# Patient Record
Sex: FEMALE | Race: WHITE | URBAN - METROPOLITAN AREA
[De-identification: names, ages, dates, MRNs, and addresses within clinical notes are randomized per-mention and may not be internally consistent; named-entity substitution may affect disease eponyms.]

---
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
## 2022-11-20 ENCOUNTER — APPOINTMENT (OUTPATIENT)
Dept: ULTRASOUND IMAGING | Age: 22
End: 2022-11-20
Payer: COMMERCIAL

## 2022-11-20 ENCOUNTER — HOSPITAL ENCOUNTER (OUTPATIENT)
Age: 22
Setting detail: OBSERVATION
Discharge: HOME | End: 2022-11-22
Attending: EMERGENCY MEDICINE
Payer: COMMERCIAL

## 2022-11-20 DIAGNOSIS — R11.2 INTRACTABLE NAUSEA AND VOMITING: Primary | ICD-10-CM

## 2022-11-20 DIAGNOSIS — E87.3 METABOLIC ALKALOSIS: ICD-10-CM

## 2022-11-20 DIAGNOSIS — D72.829 LEUKOCYTOSIS, UNSPECIFIED TYPE: ICD-10-CM

## 2022-11-20 DIAGNOSIS — E87.6 HYPOKALEMIA: ICD-10-CM

## 2022-11-20 DIAGNOSIS — E83.42 HYPOMAGNESEMIA: ICD-10-CM

## 2022-11-20 LAB
A/G RATIO: 1.7 (ref 1.1–2.2)
ALBUMIN SERPL-MCNC: 4.4 G/DL (ref 3.4–5)
ALP BLD-CCNC: 65 U/L (ref 40–129)
ALT SERPL-CCNC: 20 U/L (ref 10–40)
ANION GAP SERPL CALCULATED.3IONS-SCNC: 20 MMOL/L (ref 3–16)
AST SERPL-CCNC: 22 U/L (ref 15–37)
BACTERIA: ABNORMAL /HPF
BASE EXCESS VENOUS: -2.2 MMOL/L (ref -3–3)
BASOPHILS ABSOLUTE: 0.1 K/UL (ref 0–0.2)
BASOPHILS RELATIVE PERCENT: 0.9 %
BILIRUB SERPL-MCNC: 0.7 MG/DL (ref 0–1)
BILIRUBIN URINE: ABNORMAL
BLOOD, URINE: ABNORMAL
BUN BLDV-MCNC: 9 MG/DL (ref 7–20)
CALCIUM SERPL-MCNC: 9.4 MG/DL (ref 8.3–10.6)
CARBOXYHEMOGLOBIN: 1.1 % (ref 0–1.5)
CHLORIDE BLD-SCNC: 96 MMOL/L (ref 99–110)
CLARITY: CLEAR
CO2: 18 MMOL/L (ref 21–32)
COLOR: YELLOW
CREAT SERPL-MCNC: 0.6 MG/DL (ref 0.6–1.1)
EOSINOPHILS ABSOLUTE: 0 K/UL (ref 0–0.6)
EOSINOPHILS RELATIVE PERCENT: 0.1 %
EPITHELIAL CELLS, UA: ABNORMAL /HPF (ref 0–5)
GFR SERPL CREATININE-BSD FRML MDRD: >60 ML/MIN/{1.73_M2}
GLUCOSE BLD-MCNC: 127 MG/DL (ref 70–99)
GLUCOSE BLD-MCNC: 142 MG/DL (ref 70–99)
GLUCOSE URINE: NEGATIVE MG/DL
HCG QUALITATIVE: NEGATIVE
HCO3 VENOUS: 18.3 MMOL/L (ref 23–29)
HCT VFR BLD CALC: 39.3 % (ref 36–48)
HEMOGLOBIN: 13.5 G/DL (ref 12–16)
KETONES, URINE: >=80 MG/DL
LEUKOCYTE ESTERASE, URINE: NEGATIVE
LIPASE: 14 U/L (ref 13–60)
LYMPHOCYTES ABSOLUTE: 1.4 K/UL (ref 1–5.1)
LYMPHOCYTES RELATIVE PERCENT: 9.3 %
MAGNESIUM: 1.7 MG/DL (ref 1.8–2.4)
MCH RBC QN AUTO: 30 PG (ref 26–34)
MCHC RBC AUTO-ENTMCNC: 34.3 G/DL (ref 31–36)
MCV RBC AUTO: 87.4 FL (ref 80–100)
METHEMOGLOBIN VENOUS: 0.6 %
MICROSCOPIC EXAMINATION: YES
MONOCYTES ABSOLUTE: 0.4 K/UL (ref 0–1.3)
MONOCYTES RELATIVE PERCENT: 2.9 %
MUCUS: ABNORMAL /LPF
NEUTROPHILS ABSOLUTE: 12.8 K/UL (ref 1.7–7.7)
NEUTROPHILS RELATIVE PERCENT: 86.8 %
NITRITE, URINE: NEGATIVE
O2 SAT, VEN: 99 %
O2 THERAPY: ABNORMAL
PCO2, VEN: 22.3 MMHG (ref 40–50)
PDW BLD-RTO: 13.3 % (ref 12.4–15.4)
PERFORMED ON: ABNORMAL
PH UA: 6 (ref 5–8)
PH VENOUS: 7.53 (ref 7.35–7.45)
PLATELET # BLD: 408 K/UL (ref 135–450)
PMV BLD AUTO: 7.8 FL (ref 5–10.5)
PO2, VEN: 146.1 MMHG (ref 25–40)
POTASSIUM REFLEX MAGNESIUM: 3.3 MMOL/L (ref 3.5–5.1)
PROTEIN UA: ABNORMAL MG/DL
RBC # BLD: 4.5 M/UL (ref 4–5.2)
RBC UA: ABNORMAL /HPF (ref 0–4)
SODIUM BLD-SCNC: 134 MMOL/L (ref 136–145)
SPECIFIC GRAVITY UA: >=1.03 (ref 1–1.03)
TCO2 CALC VENOUS: 19 MMOL/L
TOTAL PROTEIN: 7 G/DL (ref 6.4–8.2)
TROPONIN: <0.01 NG/ML
URINE REFLEX TO CULTURE: ABNORMAL
URINE TYPE: ABNORMAL
UROBILINOGEN, URINE: 0.2 E.U./DL
WBC # BLD: 14.7 K/UL (ref 4–11)
WBC UA: ABNORMAL /HPF (ref 0–5)

## 2022-11-20 PROCEDURE — C9113 INJ PANTOPRAZOLE SODIUM, VIA: HCPCS | Performed by: HOSPITALIST

## 2022-11-20 PROCEDURE — 96375 TX/PRO/DX INJ NEW DRUG ADDON: CPT

## 2022-11-20 PROCEDURE — 96361 HYDRATE IV INFUSION ADD-ON: CPT

## 2022-11-20 PROCEDURE — 83690 ASSAY OF LIPASE: CPT

## 2022-11-20 PROCEDURE — 82803 BLOOD GASES ANY COMBINATION: CPT

## 2022-11-20 PROCEDURE — 84703 CHORIONIC GONADOTROPIN ASSAY: CPT

## 2022-11-20 PROCEDURE — 96366 THER/PROPH/DIAG IV INF ADDON: CPT

## 2022-11-20 PROCEDURE — G0378 HOSPITAL OBSERVATION PER HR: HCPCS

## 2022-11-20 PROCEDURE — 76705 ECHO EXAM OF ABDOMEN: CPT

## 2022-11-20 PROCEDURE — 2580000003 HC RX 258: Performed by: REGISTERED NURSE

## 2022-11-20 PROCEDURE — 6360000002 HC RX W HCPCS: Performed by: REGISTERED NURSE

## 2022-11-20 PROCEDURE — 84484 ASSAY OF TROPONIN QUANT: CPT

## 2022-11-20 PROCEDURE — 96372 THER/PROPH/DIAG INJ SC/IM: CPT

## 2022-11-20 PROCEDURE — 85025 COMPLETE CBC W/AUTO DIFF WBC: CPT

## 2022-11-20 PROCEDURE — 80053 COMPREHEN METABOLIC PANEL: CPT

## 2022-11-20 PROCEDURE — 83036 HEMOGLOBIN GLYCOSYLATED A1C: CPT

## 2022-11-20 PROCEDURE — 83735 ASSAY OF MAGNESIUM: CPT

## 2022-11-20 PROCEDURE — 6370000000 HC RX 637 (ALT 250 FOR IP): Performed by: REGISTERED NURSE

## 2022-11-20 PROCEDURE — 2580000003 HC RX 258: Performed by: HOSPITALIST

## 2022-11-20 PROCEDURE — 96365 THER/PROPH/DIAG IV INF INIT: CPT

## 2022-11-20 PROCEDURE — 6360000002 HC RX W HCPCS: Performed by: HOSPITALIST

## 2022-11-20 PROCEDURE — 93005 ELECTROCARDIOGRAM TRACING: CPT | Performed by: REGISTERED NURSE

## 2022-11-20 PROCEDURE — 99285 EMERGENCY DEPT VISIT HI MDM: CPT

## 2022-11-20 PROCEDURE — 81001 URINALYSIS AUTO W/SCOPE: CPT

## 2022-11-20 RX ORDER — MAGNESIUM SULFATE IN WATER 40 MG/ML
2000 INJECTION, SOLUTION INTRAVENOUS ONCE
Status: COMPLETED | OUTPATIENT
Start: 2022-11-20 | End: 2022-11-20

## 2022-11-20 RX ORDER — ONDANSETRON 2 MG/ML
4 INJECTION INTRAMUSCULAR; INTRAVENOUS ONCE
Status: COMPLETED | OUTPATIENT
Start: 2022-11-20 | End: 2022-11-20

## 2022-11-20 RX ORDER — SODIUM CHLORIDE 0.9 % (FLUSH) 0.9 %
5-40 SYRINGE (ML) INJECTION PRN
Status: DISCONTINUED | OUTPATIENT
Start: 2022-11-20 | End: 2022-11-22 | Stop reason: HOSPADM

## 2022-11-20 RX ORDER — METOCLOPRAMIDE HYDROCHLORIDE 5 MG/ML
10 INJECTION INTRAMUSCULAR; INTRAVENOUS 3 TIMES DAILY
Status: DISCONTINUED | OUTPATIENT
Start: 2022-11-20 | End: 2022-11-22

## 2022-11-20 RX ORDER — ACETAMINOPHEN 650 MG/1
650 SUPPOSITORY RECTAL EVERY 6 HOURS PRN
Status: DISCONTINUED | OUTPATIENT
Start: 2022-11-20 | End: 2022-11-22 | Stop reason: HOSPADM

## 2022-11-20 RX ORDER — DEXTROSE MONOHYDRATE 100 MG/ML
INJECTION, SOLUTION INTRAVENOUS CONTINUOUS PRN
Status: DISCONTINUED | OUTPATIENT
Start: 2022-11-20 | End: 2022-11-22 | Stop reason: HOSPADM

## 2022-11-20 RX ORDER — ENOXAPARIN SODIUM 100 MG/ML
40 INJECTION SUBCUTANEOUS DAILY
Status: DISCONTINUED | OUTPATIENT
Start: 2022-11-21 | End: 2022-11-22 | Stop reason: HOSPADM

## 2022-11-20 RX ORDER — PANTOPRAZOLE SODIUM 40 MG/10ML
40 INJECTION, POWDER, LYOPHILIZED, FOR SOLUTION INTRAVENOUS DAILY
Status: DISCONTINUED | OUTPATIENT
Start: 2022-11-20 | End: 2022-11-22

## 2022-11-20 RX ORDER — SODIUM CHLORIDE 9 MG/ML
INJECTION, SOLUTION INTRAVENOUS CONTINUOUS
Status: DISCONTINUED | OUTPATIENT
Start: 2022-11-20 | End: 2022-11-22

## 2022-11-20 RX ORDER — PROMETHAZINE HYDROCHLORIDE 25 MG/ML
12.5 INJECTION, SOLUTION INTRAMUSCULAR; INTRAVENOUS ONCE
Status: COMPLETED | OUTPATIENT
Start: 2022-11-20 | End: 2022-11-20

## 2022-11-20 RX ORDER — POTASSIUM CHLORIDE 20 MEQ/1
20 TABLET, EXTENDED RELEASE ORAL ONCE
Status: COMPLETED | OUTPATIENT
Start: 2022-11-20 | End: 2022-11-20

## 2022-11-20 RX ORDER — SODIUM CHLORIDE 9 MG/ML
INJECTION, SOLUTION INTRAVENOUS PRN
Status: DISCONTINUED | OUTPATIENT
Start: 2022-11-20 | End: 2022-11-22 | Stop reason: HOSPADM

## 2022-11-20 RX ORDER — ONDANSETRON 2 MG/ML
4 INJECTION INTRAMUSCULAR; INTRAVENOUS EVERY 6 HOURS PRN
Status: DISCONTINUED | OUTPATIENT
Start: 2022-11-20 | End: 2022-11-22 | Stop reason: HOSPADM

## 2022-11-20 RX ORDER — ACETAMINOPHEN 325 MG/1
650 TABLET ORAL EVERY 6 HOURS PRN
Status: DISCONTINUED | OUTPATIENT
Start: 2022-11-20 | End: 2022-11-22 | Stop reason: HOSPADM

## 2022-11-20 RX ORDER — POTASSIUM CHLORIDE 7.45 MG/ML
10 INJECTION INTRAVENOUS PRN
Status: DISCONTINUED | OUTPATIENT
Start: 2022-11-20 | End: 2022-11-22 | Stop reason: HOSPADM

## 2022-11-20 RX ORDER — POTASSIUM CHLORIDE 20 MEQ/1
40 TABLET, EXTENDED RELEASE ORAL PRN
Status: DISCONTINUED | OUTPATIENT
Start: 2022-11-20 | End: 2022-11-22 | Stop reason: HOSPADM

## 2022-11-20 RX ORDER — POLYETHYLENE GLYCOL 3350 17 G/17G
17 POWDER, FOR SOLUTION ORAL DAILY PRN
Status: DISCONTINUED | OUTPATIENT
Start: 2022-11-20 | End: 2022-11-22 | Stop reason: HOSPADM

## 2022-11-20 RX ORDER — SODIUM CHLORIDE 0.9 % (FLUSH) 0.9 %
5-40 SYRINGE (ML) INJECTION EVERY 12 HOURS SCHEDULED
Status: DISCONTINUED | OUTPATIENT
Start: 2022-11-20 | End: 2022-11-22 | Stop reason: HOSPADM

## 2022-11-20 RX ORDER — ONDANSETRON 4 MG/1
4 TABLET, ORALLY DISINTEGRATING ORAL EVERY 8 HOURS PRN
Status: DISCONTINUED | OUTPATIENT
Start: 2022-11-20 | End: 2022-11-22 | Stop reason: HOSPADM

## 2022-11-20 RX ORDER — 0.9 % SODIUM CHLORIDE 0.9 %
1000 INTRAVENOUS SOLUTION INTRAVENOUS ONCE
Status: COMPLETED | OUTPATIENT
Start: 2022-11-20 | End: 2022-11-20

## 2022-11-20 RX ORDER — DICYCLOMINE HYDROCHLORIDE 10 MG/ML
10 INJECTION INTRAMUSCULAR ONCE
Status: COMPLETED | OUTPATIENT
Start: 2022-11-20 | End: 2022-11-20

## 2022-11-20 RX ADMIN — DICYCLOMINE HYDROCHLORIDE 10 MG: 10 INJECTION, SOLUTION INTRAMUSCULAR at 16:04

## 2022-11-20 RX ADMIN — SODIUM CHLORIDE 1000 ML: 9 INJECTION, SOLUTION INTRAVENOUS at 17:19

## 2022-11-20 RX ADMIN — SODIUM CHLORIDE: 9 INJECTION, SOLUTION INTRAVENOUS at 20:13

## 2022-11-20 RX ADMIN — SODIUM CHLORIDE 1000 ML: 9 INJECTION, SOLUTION INTRAVENOUS at 16:20

## 2022-11-20 RX ADMIN — POTASSIUM CHLORIDE 20 MEQ: 20 TABLET, EXTENDED RELEASE ORAL at 17:45

## 2022-11-20 RX ADMIN — ONDANSETRON 4 MG: 2 INJECTION INTRAMUSCULAR; INTRAVENOUS at 16:20

## 2022-11-20 RX ADMIN — PROMETHAZINE HYDROCHLORIDE 12.5 MG: 25 INJECTION INTRAMUSCULAR; INTRAVENOUS at 17:28

## 2022-11-20 RX ADMIN — MAGNESIUM SULFATE HEPTAHYDRATE 2000 MG: 40 INJECTION, SOLUTION INTRAVENOUS at 17:22

## 2022-11-20 RX ADMIN — PANTOPRAZOLE SODIUM 40 MG: 40 INJECTION, POWDER, FOR SOLUTION INTRAVENOUS at 20:05

## 2022-11-20 RX ADMIN — SODIUM CHLORIDE, PRESERVATIVE FREE 10 ML: 5 INJECTION INTRAVENOUS at 20:14

## 2022-11-20 RX ADMIN — METOCLOPRAMIDE HYDROCHLORIDE 10 MG: 5 INJECTION INTRAMUSCULAR; INTRAVENOUS at 20:05

## 2022-11-20 ASSESSMENT — PAIN DESCRIPTION - ORIENTATION
ORIENTATION: RIGHT;LEFT;MID
ORIENTATION: RIGHT

## 2022-11-20 ASSESSMENT — ENCOUNTER SYMPTOMS
SORE THROAT: 0
BLOOD IN STOOL: 0
RHINORRHEA: 0
CHEST TIGHTNESS: 0
CONSTIPATION: 0
ABDOMINAL PAIN: 1
DIARRHEA: 0
VOMITING: 1
SHORTNESS OF BREATH: 0
COUGH: 0
BACK PAIN: 0
NAUSEA: 1

## 2022-11-20 ASSESSMENT — PAIN SCALES - GENERAL
PAINLEVEL_OUTOF10: 8
PAINLEVEL_OUTOF10: 8

## 2022-11-20 ASSESSMENT — PAIN - FUNCTIONAL ASSESSMENT: PAIN_FUNCTIONAL_ASSESSMENT: 0-10

## 2022-11-20 ASSESSMENT — PAIN DESCRIPTION - LOCATION
LOCATION: ABDOMEN
LOCATION: ABDOMEN

## 2022-11-20 NOTE — ED PROVIDER NOTES
[address] [address] TELE/MED SURG/ONC  EMERGENCY DEPARTMENT ENCOUNTER        Pt Name: Orvilla Eisenmenger  MRN: [id number]  [place] 2000  Date of evaluation: 11/20/2022  Provider: FIDENCIO Drummond - CNP  PCP: John Tapia MD    This patient was seen and evaluated by the attending physician Sarah Coombs MD.    I have evaluated this patient. My supervising physician was available for consultation. CHIEF COMPLAINT       Chief Complaint   Patient presents with    Emesis     Pt reports she was discharged yesterday for vomiting, \"I was better and today I am not. \" Pt is alert and oriented. Type 1 diabetic. HISTORY OF PRESENT ILLNESS   (Location/Symptom, Timing/Onset, Context/Setting, Quality, Duration, Modifying Factors, Severity)  Note limiting factors. Orvilla Eisenmenger is a 25 y.o. female who presents via private car for  abd pain, nausea and vomiting. Onset was several months. Duration has been intermittent since the onset. Context includes patient presents to the emergency department today with complaints of nausea and vomiting. She states that this has been ongoing for the last several months states she is worked with her primary care physician to identify a cause. She states she does have a referral for GI. She recently had an endoscopy and was told that this was negative. She did have a history of daily marijuana usage and they thought that could be the cause so she stopped smoking approximately 1 month ago. She endorses that on Friday her nausea and vomiting became such that she was unable to tolerate any p.o. She went to the emergency department and was admitted. At that time she had a CT scan that she was told was negative. She states she was discharged yesterday but today has had continued nausea and vomiting is not able to keep anything down. Today she is endorsing epigastric abdominal pain and right upper quadrant abdominal pain.   She denies any chest pain, palpitations or swelling in her extremities. She denies any shortness of breath, cough congestion or fevers. She denies any constipation or diarrhea and states that her last bowel movement was yesterday and was normal.  She denies any urinary symptoms including dysuria, urgency, frequency, hematuria or flank pain. She is a type I diabetic with a basal insulin rate of 1.05.  Current blood sugar is 93. Quality is aching and cramping  with radiation to her epigastrum and right upper quadrant. Alleviating factors include nothing. Aggravating factors include eating. Pain is 8/10. Nothing has been used for pain today. Chart review reveals pt has significant PMHx of depression, diabetes. They take elavil, humalog, lantus, sprintec. Nursing Notes were all reviewed and agreed with or any disagreements were addressed  in the HPI. Pt was seen during the [place] 19 pandemic. Appropriate PPE worn by ME during patient encounters. Pt seen during a time with constrained hospital bed capacity and other potential inpatient and outpatient resources were constrained due to the viral pandemic. REVIEW OF SYSTEMS    (2-9 systems for level 4, 10 or more for level 5)     Review of Systems   Constitutional:  Negative for chills, diaphoresis and fever. HENT:  Negative for congestion, rhinorrhea and sore throat. Respiratory:  Negative for cough, chest tightness and shortness of breath. Cardiovascular:  Negative for chest pain, palpitations and leg swelling. Gastrointestinal:  Positive for abdominal pain, nausea and vomiting. Negative for blood in stool, constipation and diarrhea. Nonbilious nonbloody emesis   Genitourinary:  Negative for difficulty urinating, dysuria, flank pain, frequency, hematuria, pelvic pain and urgency. Musculoskeletal:  Negative for back pain and myalgias. Skin:  Negative for rash and wound. Neurological:  Negative for dizziness, light-headedness and headaches.      Positives and Pertinent negatives as per HPI. Except as noted abovein the ROS, all other systems were reviewed and negative. PAST MEDICAL HISTORY     Past Medical History:   Diagnosis Date    ADHD     Diabetes mellitus ([address]          SURGICAL HISTORY     Past Surgical History:   Procedure Laterality Date    TONSILLECTOMY           CURRENTMEDICATIONS       Current Discharge Medication List        CONTINUE these medications which have NOT CHANGED    Details   HUMALOG 100 UNIT/ML injection vial       amitriptyline (ELAVIL) 50 MG tablet       SPRINTEC 28 0.25-35 MG-MCG per tablet       insulin glargine (LANTUS) 100 UNIT/ML injection Inject  into the skin nightly. ALLERGIES     Patient has no known allergies. FAMILYHISTORY     History reviewed. No pertinent family history. SOCIAL HISTORY       Social History     Socioeconomic History    Marital status: Single     Spouse name: None    Number of children: None    Years of education: None    Highest education level: None   Tobacco Use    Smoking status: Never   Substance and Sexual Activity    Alcohol use: Not Currently    Drug use: Never       SCREENINGS             PHYSICAL EXAM    (up to 7 for level 4, 8 or more for level 5)     ED Triage Vitals   BP Temp Temp src Pulse Resp SpO2 Height Weight   -- -- -- -- -- -- -- --       Physical Exam  Vitals and nursing note reviewed. Constitutional:       Appearance: Normal appearance. She is not ill-appearing or diaphoretic. HENT:      Head: Normocephalic and atraumatic. Right Ear: External ear normal.      Left Ear: External ear normal.      Mouth/Throat:      Mouth: Mucous membranes are dry. Pharynx: Oropharynx is clear. Eyes:      General:         Right eye: No discharge. Left eye: No discharge. Cardiovascular:      Rate and Rhythm: Normal rate and regular rhythm. Pulses: Normal pulses. Radial pulses are 2+ on the right side and 2+ on the left side.       Heart sounds: Normal heart sounds. No murmur heard. No friction rub. No gallop. Pulmonary:      Effort: Pulmonary effort is normal. No respiratory distress. Breath sounds: Normal breath sounds. No stridor. No wheezing, rhonchi or rales. Chest:      Chest wall: No tenderness. Abdominal:      General: Abdomen is flat. Bowel sounds are normal.      Palpations: Abdomen is soft. Tenderness: There is abdominal tenderness in the right upper quadrant and epigastric area. There is no right CVA tenderness, left CVA tenderness, guarding or rebound. Positive signs include Campos's sign. Negative signs include Rovsing's sign, McBurney's sign, psoas sign and obturator sign. Hernia: No hernia is present. Musculoskeletal:         General: Normal range of motion. Cervical back: Normal range of motion and neck supple. Skin:     General: Skin is warm and dry. Neurological:      General: No focal deficit present. Mental Status: She is alert and oriented to person, place, and time. GCS: GCS eye subscore is 4. GCS verbal subscore is 5. GCS motor subscore is 6.    Psychiatric:         Mood and Affect: Mood normal.         Behavior: Behavior normal.     PHYSICAL EXAM  BP (!) 177/99   Pulse 67   Temp 98.1 °F (36.7 °C) (Oral)   Resp 22   Ht 5' 2\" (1.575 m)   Wt 135 lb 12.8 oz (61.6 kg)   LMP 11/14/2022   SpO2 99%   BMI 24.84 kg/m²       DIAGNOSTIC RESULTS   LABS:    Labs Reviewed   CBC WITH AUTO DIFFERENTIAL - Abnormal; Notable for the following components:       Result Value    WBC 14.7 (*)     Neutrophils Absolute 12.8 (*)     All other components within normal limits   COMPREHENSIVE METABOLIC PANEL W/ REFLEX TO MG FOR LOW K - Abnormal; Notable for the following components:    Sodium 134 (*)     Potassium reflex Magnesium 3.3 (*)     Chloride 96 (*)     CO2 18 (*)     Anion Gap 20 (*)     Glucose 127 (*)     All other components within normal limits   BLOOD GAS, VENOUS - Abnormal; Notable for the following components:    pH, Daniel 7.533 (*)     pCO2, Daniel 22.3 (*)     pO2, Daniel 146.1 (*)     HCO3, Venous 18.3 (*)     All other components within normal limits   URINALYSIS WITH REFLEX TO CULTURE - Abnormal; Notable for the following components:    Bilirubin Urine SMALL (*)     Ketones, Urine >=80 (*)     Blood, Urine TRACE-INTACT (*)     Protein, UA TRACE (*)     All other components within normal limits   MICROSCOPIC URINALYSIS - Abnormal; Notable for the following components:    Mucus, UA 3+ (*)     Epithelial Cells, UA 11-20 (*)     Bacteria, UA 2+ (*)     All other components within normal limits   MAGNESIUM - Abnormal; Notable for the following components:    Magnesium 1.70 (*)     All other components within normal limits   POCT GLUCOSE - Abnormal; Notable for the following components:    POC Glucose 142 (*)     All other components within normal limits   HCG, SERUM, QUALITATIVE   TROPONIN   LIPASE   BASIC METABOLIC PANEL W/ REFLEX TO MG FOR LOW K   CBC WITH AUTO DIFFERENTIAL   HEMOGLOBIN A1C   POCT GLUCOSE   POCT GLUCOSE   POCT GLUCOSE       All other labs were within normal range or not returned as of this dictation. EKG: All EKG's are interpreted by the Emergency Department Physician who either signs orCo-signs this chart in the absence of a cardiologist.  Please see their note for interpretation of EKG. RADIOLOGY:   Non-plain film images such as CT, Ultrasound and MRI are read by the radiologist. [place] radiographic images are visualized andpreliminarily interpreted by the  ED Provider with the below findings:        Interpretation perthe Radiologist below, if available at the time of this note:    [address]   Final Result   No cholelithiasis or significant dilation of the common duct. 4 cm hypoechoic focus in the medial aspect of the right lobe of the liver. This is most likely focal fat infiltration. Non-urgent follow-up CT liver is   recommended. No results found. PROCEDURES   Unless otherwise noted below, none     Procedures    CRITICAL CARE TIME   N/A    CONSULTS:  IP CONSULT TO GI      EMERGENCY DEPARTMENT COURSE and DIFFERENTIALDIAGNOSIS/MDM:   Vitals:    Vitals:    11/20/22 1523 11/20/22 1722 11/20/22 1915 11/20/22 1921   BP: (!) 155/99 (!) 150/90 (!) 177/99    Pulse:  74 75 67   Resp:  18 22    Temp:   98.1 °F (36.7 °C)    TempSrc:   Oral    SpO2:  100% 99% 99%   Weight:   135 lb 12.8 oz (61.6 kg)    Height:   5' 2\" (1.575 m)        Patient was given thefollowing medications:  Medications   glucose chewable tablet 16 g (has no administration in time range)   dextrose bolus 10% 125 mL (has no administration in time range)     Or   dextrose bolus 10% 250 mL (has no administration in time range)   glucagon (rDNA) injection 1 mg (has no administration in time range)   dextrose 10 % infusion (has no administration in time range)   sodium chloride flush 0.9 % injection 5-40 mL (10 mLs IntraVENous Given 11/20/22 2014)   sodium chloride flush 0.9 % injection 5-40 mL (has no administration in time range)   0.9 % sodium chloride infusion (has no administration in time range)   enoxaparin (LOVENOX) injection 40 mg (has no administration in time range)   ondansetron (ZOFRAN-ODT) disintegrating tablet 4 mg (has no administration in time range)     Or   ondansetron (ZOFRAN) injection 4 mg (has no administration in time range)   polyethylene glycol (GLYCOLAX) packet 17 g (has no administration in time range)   acetaminophen (TYLENOL) tablet 650 mg (has no administration in time range)     Or   acetaminophen (TYLENOL) suppository 650 mg (has no administration in time range)   potassium chloride (KLOR-CON M) extended release tablet 40 mEq (has no administration in time range)     Or   potassium bicarb-citric acid (EFFER-K) effervescent tablet 40 mEq (has no administration in time range)     Or   potassium chloride 10 mEq/100 mL IVPB (Peripheral Line) (has no administration in time range)   0.9 % sodium chloride infusion ( IntraVENous New Bag 11/20/22 2013)   metoclopramide (REGLAN) injection 10 mg (10 mg IntraVENous Given 11/20/22 2005)   pantoprazole (PROTONIX) injection 40 mg (40 mg IntraVENous Given 11/20/22 2005)   0.9 % sodium chloride bolus (0 mLs IntraVENous Stopped 11/20/22 1718)   ondansetron (ZOFRAN) injection 4 mg (4 mg IntraVENous Given 11/20/22 1620)   dicyclomine (BENTYL) injection 10 mg (10 mg IntraMUSCular Given 11/20/22 1604)   0.9 % sodium chloride bolus (0 mLs IntraVENous Stopped 11/20/22 2009)   magnesium sulfate 2000 mg in 50 mL IVPB premix (0 mg IntraVENous Stopped 11/20/22 1936)   potassium chloride (KLOR-CON M) extended release tablet 20 mEq (20 mEq Oral Given 11/20/22 1745)   promethazine (PHENERGAN) injection 12.5 mg (12.5 mg IntraMUSCular Given 11/20/22 1728)       PDMP Monitoring:    Last PDMP Anupam as Reviewed [facility]):  Review User Review Instant Review Result            Urine Drug Screenings (1 yr)    No resulted procedures found. Medication Contract and Consent for Opioid Use Documents Filed        No documents found                    MDM:   This patient was seen and evaluated by myself and my attending physician. She presents to the emergency department today with a chronic history of nausea vomiting and abdominal pain. She states is worsening in the last 24 hours and localized to the epigastrium and right upper quadrant. On exam she is alert and oriented, hemodynamically stable and nontoxic in appearance. She will have a work-up in the emergency department consisting of Mal Hair studies and ultrasound imaging. She will be given an IV bolus as well as Zofran for nausea and Bentyl for cramping. On reevaluation Zofran did not help with nausea. Patient was given a subsequent dose of Phenergan which did relieve some of her nausea. Laboratory studies and images were evaluated and reviewed with the patient.   CBC does reveal mild leukocytosis at 14.7 otherwise grossly negative. CMP reveals mild hyponatremia at 134, potassium 3.3, she did receive 20 mEq p.o. and was able to tolerate. Chloride 96, CO2 18, anion gap 20, glucose 127. Venous blood gas reveals pH 7.5, PCO2 22.3, PO2 146, bicarb 18.3  Urinalysis does reveal small amount of bili with greater than 80 ketones and trace blood with trace protein. hCG is negative. Urine micro reveals 3+ mucus with 11-20 epithelial cells and 2+ bacteria. Magnesium 1.7, she did receive 2 g via IV replacement. Troponin negative, lipase 14. Ultrasound gallbladder interpreted by the radiologist for no cholelithiasis or significant dilation of the common duct. There is a 4 cm hypoechoic focus in the medial aspect of the right lobe of the liver most likely fatty infiltration, I did communicate this finding with the patient. EKG interpreted by the attending physician. Interpretation on EKG stated [email or web address], Hospitalist notified of this finding via perfect serve. The patient was given a second liter of IV fluids due to increased ketones in urine. I discussed with her a plan for admission for electrolyte dyscrasias, rehydration and GI consult she was agreeable to this plan. Consult placed to hospital medicine for disposition of admission. Is this patient to be included in the SEP-1 Core Measure due to severe sepsis or septic shock? No   Exclusion criteria - the patient is NOT to be included for SEP-1 Core Measure due to:  2+ SIRS criteria are not met    Discharge Time out:  CC Reviewed Yes   Test Results Yes     Vitals:    11/20/22 1921   BP:    Pulse: 67   Resp:    Temp:    SpO2: 99%              FINAL IMPRESSION      1. Intractable nausea and vomiting    2. Hypokalemia    3. Hypomagnesemia    4. Metabolic alkalosis    5. Leukocytosis, unspecified type          DISPOSITION/PLAN   DISPOSITION Admitted 11/20/2022 06:10:27 PM      PATIENT REFERREDTO:  No follow-up provider specified.     DISCHARGE MEDICATIONS:  Current Discharge Medication List          DISCONTINUED MEDICATIONS:  Current Discharge Medication List                 (Please note that portions ofthis note were completed with a voice recognition program.  Efforts were made to edit the dictations but occasionally words are mis-transcribed.)    Eldora Bence, APRN - CNP (electronically signed)       Eldora Bence, APRN - CNP  11/20/22 [facility] 8104, FIDENCIO Paez CNP  11/20/22 9370

## 2022-11-20 NOTE — PROGRESS NOTES
24 yo female patient of Dr. Baldo Abdul being admitted for recurrent nausea/vomiting.   Full evaluation in the am.

## 2022-11-20 NOTE — H&P
Hospital Medicine History & Physical      PCP: Shelbi Pablo MD    Date of Admission: 11/20/2022    Date of Service: Pt seen/examined on 11/20/2022 and Admitted to Inpatient with expected LOS less than two midnights due to medical therapy. Placed in Observation. Chief Complaint: Nausea vomiting      History Of Present Illness:      25 y.o. female who presented to [facility] with nausea vomiting started since Friday unable to keep anything down came to the emergency room, patient was noted to have hypokalemia no DKA, patient with a history of insulin-dependent diabetes mellitus since age 7 followed by endocrinologist at [facility] Dr. John Wood on insulin pump, patient is stated recently she changed her birth control from NuvaRing to start on [facility] 3 weeks ago started having nausea on Friday. Patient denies any fever chills no chest pain or shortness of breath no cough no sputum positive nausea vomiting denies any diarrhea. She does not to smoke or drink alcohol. Past Medical History:          Diagnosis Date    ADHD     Diabetes mellitus ([address]        Past Surgical History:          Procedure Laterality Date    TONSILLECTOMY         Medications Prior to Admission:      Prior to Admission medications    Medication Sig Start Date End Date Taking? Authorizing Provider   HUMALOG 100 UNIT/ML injection vial  11/24/20   Historical Provider, MD   amitriptyline (ELAVIL) 50 MG tablet  11/2/20   Historical Provider, MD   [address] [address] 0.25-35 MG-MCG per tablet  12/7/20   Historical Provider, MD   insulin glargine (LANTUS) 100 UNIT/ML injection Inject  into the skin nightly. Historical Provider, MD       Allergies:  Patient has no known allergies. Social History:      The patient currently lives with her grandmother    TOBACCO:   reports that she has never smoked. She does not have any smokeless tobacco history on file. ETOH:   reports that she does not currently use alcohol.   E-cigarette/Vaping Questions Responses    E-cigarette/Vaping Use     Start Date     Passive Exposure     Quit Date     Counseling Given     Comments               Family History:       Reviewed and negative in regards to presenting illness/complaint. History reviewed. No pertinent family history. REVIEW OF SYSTEMS COMPLETED:   Pertinent positives as noted in the HPI. All other systems reviewed and negative. PHYSICAL EXAM PERFORMED:    BP (!) 150/90   Pulse 74   Temp 97.9 °F (36.6 °C) (Oral)   Resp 18   LMP 11/14/2022   SpO2 100%     General appearance:  No apparent distress, appears stated age and cooperative. HEENT:  Normal cephalic, atraumatic without obvious deformity. Pupils equal, round, and reactive to light. Extra ocular muscles intact. Conjunctivae/corneas clear. Neck: Supple, with full range of motion. No jugular venous distention. Trachea midline. Respiratory:  Normal respiratory effort. Clear to auscultation, bilaterally without Rales/Wheezes/Rhonchi. Cardiovascular:  Regular rate and rhythm with normal S1/S2 without murmurs, rubs or gallops. Abdomen: Soft, non-tender, non-distended with normal bowel sounds. Musculoskeletal:  No clubbing, cyanosis or edema bilaterally. Full range of motion without deformity. Skin: Skin color, texture, turgor normal.  No rashes or lesions. Neurologic:  Neurovascularly intact without any focal sensory/motor deficits.  Cranial nerves: II-XII intact, grossly non-focal.  Psychiatric:  Alert and oriented, thought content appropriate, normal insight  Capillary Refill: Brisk,3 seconds, normal  Peripheral Pulses: +2 palpable, equal bilaterally       Labs:     Recent Labs     11/20/22  1610   WBC 14.7*   HGB 13.5   HCT 39.3        Recent Labs     11/20/22  1610   *   K 3.3*   CL 96*   CO2 18*   BUN 9   CREATININE 0.6   CALCIUM 9.4     Recent Labs     11/20/22  1610   AST 22   ALT 20   BILITOT 0.7   ALKPHOS 65     No results for input(s): INR in the last 72 hours. No results for input(s): Taco Middleton in the last 72 hours. Urinalysis:      Lab Results   Component Value Date/Time    NITRU Negative 11/20/2022 04:10 PM    WBCUA 3-5 11/20/2022 04:10 PM    BACTERIA 2+ 11/20/2022 04:10 PM    RBCUA 3-4 11/20/2022 04:10 PM    BLOODU TRACE-INTACT 11/20/2022 04:10 PM    SPECGRAV >=1.030 11/20/2022 04:10 PM    GLUCOSEU Negative 11/20/2022 04:10 PM       Radiology:     CXR: I have reviewed the CXR with the following interpretation:   EKG:  I have reviewed the EKG with the following interpretation:     US GALLBLADDER RUQ   Final Result   No cholelithiasis or significant dilation of the common duct. 4 cm hypoechoic focus in the medial aspect of the right lobe of the liver. This is most likely focal fat infiltration. Non-urgent follow-up CT liver is   recommended. Consults:    IP CONSULT TO GI    ASSESSMENT:    Active Hospital Problems    Diagnosis Date Noted    Intractable nausea and vomiting [R11.2] 11/20/2022     Priority: Medium   Insulin-dependent diabetes mellitus type 1      PLAN:  This is a 71-year-old female with a history of insulin-dependent diabetes mellitus type 1 since age 7 on insulin pump with intractable nausea vomiting will admit patient, started on IV fluid, clear liquids as tolerated, patient will manage her own insulin pump, recent hemoglobin A1c per mother 2 weeks ago 6.0. Started on Reglan IV 10 mg 3 times daily. Zofran as needed consulted GI. Hypokalemia supplement with IV potassium. Birth control continue with OCP pregnancy test negative in the emergency room. DVT Prophylaxis: Lovenox subcu  Diet: Clear liquids  Code Status: Full code    PT/OT Eval Status: Not indicated    Sara Mccoy MD    Thank you Rosas Villeda MD for the opportunity to be involved in this patient's care. If you have any questions or concerns please feel free to contact me at [phone].

## 2022-11-21 LAB
ANION GAP SERPL CALCULATED.3IONS-SCNC: 13 MMOL/L (ref 3–16)
BASOPHILS ABSOLUTE: 0.1 K/UL (ref 0–0.2)
BASOPHILS RELATIVE PERCENT: 0.8 %
BUN BLDV-MCNC: 3 MG/DL (ref 7–20)
CALCIUM SERPL-MCNC: 8.3 MG/DL (ref 8.3–10.6)
CHLORIDE BLD-SCNC: 102 MMOL/L (ref 99–110)
CO2: 21 MMOL/L (ref 21–32)
CREAT SERPL-MCNC: <0.5 MG/DL (ref 0.6–1.1)
EKG ATRIAL RATE: 54 BPM
EKG DIAGNOSIS: NORMAL
EKG P AXIS: -18 DEGREES
EKG P-R INTERVAL: 108 MS
EKG Q-T INTERVAL: 488 MS
EKG QRS DURATION: 112 MS
EKG QTC CALCULATION (BAZETT): 462 MS
EKG R AXIS: 37 DEGREES
EKG T AXIS: 69 DEGREES
EKG VENTRICULAR RATE: 54 BPM
EOSINOPHILS ABSOLUTE: 0 K/UL (ref 0–0.6)
EOSINOPHILS RELATIVE PERCENT: 0.2 %
ESTIMATED AVERAGE GLUCOSE: 134.1 MG/DL
GFR SERPL CREATININE-BSD FRML MDRD: >60 ML/MIN/{1.73_M2}
GLUCOSE BLD-MCNC: 85 MG/DL (ref 70–99)
HBA1C MFR BLD: 6.3 %
HCT VFR BLD CALC: 38.8 % (ref 36–48)
HEMOGLOBIN: 12.9 G/DL (ref 12–16)
LYMPHOCYTES ABSOLUTE: 2.5 K/UL (ref 1–5.1)
LYMPHOCYTES RELATIVE PERCENT: 21.3 %
MAGNESIUM: 2 MG/DL (ref 1.8–2.4)
MCH RBC QN AUTO: 29.6 PG (ref 26–34)
MCHC RBC AUTO-ENTMCNC: 33.1 G/DL (ref 31–36)
MCV RBC AUTO: 89.4 FL (ref 80–100)
MONOCYTES ABSOLUTE: 1 K/UL (ref 0–1.3)
MONOCYTES RELATIVE PERCENT: 8.5 %
NEUTROPHILS ABSOLUTE: 8.3 K/UL (ref 1.7–7.7)
NEUTROPHILS RELATIVE PERCENT: 69.2 %
PDW BLD-RTO: 13.3 % (ref 12.4–15.4)
PLATELET # BLD: 335 K/UL (ref 135–450)
PMV BLD AUTO: 8.5 FL (ref 5–10.5)
POTASSIUM REFLEX MAGNESIUM: 3.3 MMOL/L (ref 3.5–5.1)
RBC # BLD: 4.34 M/UL (ref 4–5.2)
SODIUM BLD-SCNC: 136 MMOL/L (ref 136–145)
WBC # BLD: 11.9 K/UL (ref 4–11)

## 2022-11-21 PROCEDURE — 6360000002 HC RX W HCPCS: Performed by: HOSPITALIST

## 2022-11-21 PROCEDURE — 6360000002 HC RX W HCPCS: Performed by: NURSE PRACTITIONER

## 2022-11-21 PROCEDURE — 96376 TX/PRO/DX INJ SAME DRUG ADON: CPT

## 2022-11-21 PROCEDURE — 80048 BASIC METABOLIC PNL TOTAL CA: CPT

## 2022-11-21 PROCEDURE — 6370000000 HC RX 637 (ALT 250 FOR IP): Performed by: HOSPITALIST

## 2022-11-21 PROCEDURE — 36415 COLL VENOUS BLD VENIPUNCTURE: CPT

## 2022-11-21 PROCEDURE — 2580000003 HC RX 258: Performed by: HOSPITALIST

## 2022-11-21 PROCEDURE — 85025 COMPLETE CBC W/AUTO DIFF WBC: CPT

## 2022-11-21 PROCEDURE — 93010 ELECTROCARDIOGRAM REPORT: CPT | Performed by: INTERNAL MEDICINE

## 2022-11-21 PROCEDURE — 96361 HYDRATE IV INFUSION ADD-ON: CPT

## 2022-11-21 PROCEDURE — C9113 INJ PANTOPRAZOLE SODIUM, VIA: HCPCS | Performed by: HOSPITALIST

## 2022-11-21 PROCEDURE — 83735 ASSAY OF MAGNESIUM: CPT

## 2022-11-21 PROCEDURE — 96372 THER/PROPH/DIAG INJ SC/IM: CPT

## 2022-11-21 PROCEDURE — G0378 HOSPITAL OBSERVATION PER HR: HCPCS

## 2022-11-21 RX ORDER — METOCLOPRAMIDE HYDROCHLORIDE 5 MG/ML
10 INJECTION INTRAMUSCULAR; INTRAVENOUS ONCE
Status: COMPLETED | OUTPATIENT
Start: 2022-11-21 | End: 2022-11-21

## 2022-11-21 RX ADMIN — POTASSIUM CHLORIDE 40 MEQ: 1500 TABLET, EXTENDED RELEASE ORAL at 11:31

## 2022-11-21 RX ADMIN — METOCLOPRAMIDE HYDROCHLORIDE 10 MG: 5 INJECTION INTRAMUSCULAR; INTRAVENOUS at 09:37

## 2022-11-21 RX ADMIN — METOCLOPRAMIDE HYDROCHLORIDE 10 MG: 5 INJECTION INTRAMUSCULAR; INTRAVENOUS at 20:51

## 2022-11-21 RX ADMIN — ACETAMINOPHEN 650 MG: 325 TABLET ORAL at 03:05

## 2022-11-21 RX ADMIN — SODIUM CHLORIDE, PRESERVATIVE FREE 10 ML: 5 INJECTION INTRAVENOUS at 09:37

## 2022-11-21 RX ADMIN — SODIUM CHLORIDE: 9 INJECTION, SOLUTION INTRAVENOUS at 06:20

## 2022-11-21 RX ADMIN — ONDANSETRON 4 MG: 2 INJECTION INTRAMUSCULAR; INTRAVENOUS at 03:05

## 2022-11-21 RX ADMIN — PANTOPRAZOLE SODIUM 40 MG: 40 INJECTION, POWDER, FOR SOLUTION INTRAVENOUS at 09:37

## 2022-11-21 RX ADMIN — METOCLOPRAMIDE HYDROCHLORIDE 10 MG: 5 INJECTION INTRAMUSCULAR; INTRAVENOUS at 05:14

## 2022-11-21 RX ADMIN — ENOXAPARIN SODIUM 40 MG: 40 INJECTION SUBCUTANEOUS at 09:40

## 2022-11-21 RX ADMIN — METOCLOPRAMIDE HYDROCHLORIDE 10 MG: 5 INJECTION INTRAMUSCULAR; INTRAVENOUS at 15:12

## 2022-11-21 RX ADMIN — SODIUM CHLORIDE, PRESERVATIVE FREE 10 ML: 5 INJECTION INTRAVENOUS at 20:51

## 2022-11-21 ASSESSMENT — PAIN DESCRIPTION - LOCATION: LOCATION: HEAD

## 2022-11-21 ASSESSMENT — PAIN SCALES - GENERAL: PAINLEVEL_OUTOF10: 4

## 2022-11-21 NOTE — ED PROVIDER NOTES
I independently examined and evaluated Allison Singh. In brief, patient is a 81-year-old female presents to the emergency department for evaluation of nausea and vomiting. Focused exam revealed female who appears to not be feeling well. EKG read as London-Parkinson-White. Appears to have possible delta wave morphology. No prior EKG available for comparison. NC interval 108. NICHO to relay to hospitalist about the ekg. hospitalist consulted for admission for intractable nausea and vomiting. Admit. The Ekg interpreted by me shows  Sinus bradycardia with sinus arrhythmia with a rate of 54  QTc is prolonged at 462  Intervals and Durations are unremarkable. ST Segments: Nonspecific ST changes  No prior EKG available for comparison    All diagnostic, treatment, and disposition decisions were made by myself in conjunction with the advanced practice provider/resident physician. I personally saw the patient and performed a substantive portion of the visit including aspects of the medical decision making. Comment: Please note this report has been produced using speech recognition software and may contain errors related to that system including errors in grammar, punctuation, and spelling, as well as words and phrases that may be inappropriate. If there are any questions or concerns please feel free to contact the dictating provider for clarification. For all further details of the patient's emergency department visit, please see the advanced practice provider's documentation.        Abigail Newman MD  11/22/22 2534

## 2022-11-21 NOTE — CARE COORDINATION
Case Management Assessment  Initial Evaluation    Date/Time of Evaluation: 11/21/2022 2:26 PM  Assessment Completed by: JOVAN Morfin    If patient is discharged prior to next notation, then this note serves as note for discharge by case management. Patient Name: Danitza Batista                   YOB: 2000  Diagnosis: Hypokalemia [Z53.6]  Metabolic alkalosis [Y31.5]  Hypomagnesemia [E83.42]  Intractable nausea and vomiting [R11.2]  Leukocytosis, unspecified type [D72.829]                   Date / Time: 11/20/2022  3:18 PM    Patient Admission Status: Observation   Readmission Risk (Low < 19, Mod (19-27), High > 27): No data recorded  Current PCP: Janae Tavares MD  PCP verified by CM? Yes    Chart Reviewed: Yes      History Provided by: Patient  Patient Orientation: Alert and Oriented, Person, Place, Situation, Self    Patient Cognition: Alert    Hospitalization in the last 30 days (Readmission):  No      Advance Directives:      Code Status: Full Code   Patient's Primary Decision Maker is: Legal Next of Kin    Primary Decision MakerMarko Stein [facility] - [phone]    Discharge Planning:    Patient lives with: Family Members Type of Home: House  Primary Care Giver: Self  Patient Support Systems include: Family Members   Current services prior to admission: None            Type of Home Care services:  None    ADLS  Prior functional level: Independent in ADLs/IADLs  Current functional level: Independent in ADLs/IADLs    Family can provide assistance at DC: Yes  Would you like Case Management to discuss the discharge plan with any other family members/significant others, and if so, who?  No  Plans to Return to Present Housing: Yes  Potential Assistance needed at discharge: N/A  Patient expects to discharge to: [address] [place] [address] for transportation at discharge: Self    Financial    Payor: UMR / Plan: UMR  / Product Type: *No Product type* /     Potential assistance Purchasing Medications: No  [facility] [id number] Timothy Jarvis, [address] [facility] [phone] Ambrocio Georges [phone] [address] [facility] [address]  Phone: [phone] Fax: [phone]      Notes:    Factors facilitating achievement of predicted outcomes: Family support, Motivated, Cooperative, Pleasant, and Good insight into deficits    Barriers to discharge: pending nausea and diet tolerance     Additional Case Management Notes: Patient from home with grandparents Alisha Paris, denies needs. The Plan for Transition of Care is related to the following treatment goals of Hypokalemia [U36.0]  Metabolic alkalosis [Q16.5]  Hypomagnesemia [E83.42]  Intractable nausea and vomiting [R11.2]  Leukocytosis, unspecified type [S75.776]    The Patient and/or Patient Representative Agree with the Discharge Plan?  Yes    Elizabeth Tabor, [facility]  Case Management Department  Ph: [phone] Fax: [phone]

## 2022-11-21 NOTE — CONSULTS
Tammy Weber is a 25 y.o. female asked to see us in consultation by  Nancy Hinds MD & Anisa Pena MD for evaluation of intractable nausea, vomiting. Ms. Sweetie Montaño is a 58-year-old female with past medical history of insulin-dependent diabetes, ADHD and chronic headaches. She established care with Dr. Jasper Morales for evaluation of chronic vomiting. Symptoms are cyclical, typically occurring towards the end of her menstrual cycle. The last several episodes were preceded by stress. She says that she has a test next week that caused her severe anxiety yesterday. She reports burning epigastric pain. She denies constipation and diarrhea. She was started on Reglan after her most recent office visit. She began it about 2 days ago. She quit marijuana use a month ago. She had been on amitriptyline in the past for headaches, not for GI symptoms. She takes omeprazole as needed. She had an EGD  11/4 that was unremarkable with unremarkable gastric biopsy. She had a HIDA scan 8/3 that was negative, as well as a CTAp that reported a nonspecific adnexa or ovarian cyst.  CT last month reported mild enterocolitis and possible gastritis. She had a RUQ US on admission yesterday that shows no cholelithiasis or CBD dilation. Nonspecific hypoechoic focus noted in the liver, likely fatty infiltration. Her lipase and LFTs are normal.    She had been seen in the past by gastroenterology at [facility]..  She had some autoimmune work-up completed with a  positive CORINNA found in her records. She does not have a family history of irritable bowel disease. Medications Prior to Admission: HUMALOG 100 UNIT/ML injection vial,   amitriptyline (ELAVIL) 50 MG tablet,   SPRINTEC 28 0.25-35 MG-MCG per tablet,   insulin glargine (LANTUS) 100 UNIT/ML injection, Inject  into the skin nightly.     Medication:   sodium chloride flush  5-40 mL IntraVENous 2 times per day    enoxaparin  40 mg SubCUTAneous Daily    metoclopramide  10 mg IntraVENous TID    pantoprazole  40 mg IntraVENous Daily      dextrose      sodium chloride      sodium chloride 100 mL/hr at 11/21/22 0620       Allergies:  No Known Allergies    Immunizations:  Immunization History   Administered Date(s) Administered    COVID-19, PFIZER PURPLE top, DILUTE for use, (age 15 y+), 30mcg/0.3mL 07/23/2021, 08/14/2021       Family history, past medical history, and  social  history  are  reviewed as  below. Past Medical  History:  Past Medical History:   Diagnosis Date    ADHD     Diabetes mellitus ([facility] [address]        Past  Surgical History:  Past Surgical History:   Procedure Laterality Date    TONSILLECTOMY         Family  History:  History reviewed. No pertinent family history. Social History:  Social History     Tobacco Use    Smoking status: Never   Substance Use Topics    Alcohol use: Not Currently    Drug use: Never       ROS  Constitutional:  Denies fever,sweats, chills or weight loss  Eyes:  Denies change in visual acuity   HENT:  Denies hearing loss or dizziness  Respiratory:  Denies cough or shortness of breath   Cardiovascular:  Denies edema or chest pain  :  Denies dysuria, hematuria, urgency or frequency  Musculoskeletal:  Denies back pain or joint pain   Integument:  +  rash   Neurologic:  Denies headache   Endocrine:  Denies polyuria or polydipsia   Lymphatic:  Denies swollen glands   Psychiatric:  ++  depression or anxiety   Hematologic: Denies previous anemia or easy bruising    All other review of systems negative, except for those noted.       PHYSICAL EXAM:   VITAL SIGNS: /78   Pulse 73   Temp 98.4 °F (36.9 °C) (Oral)   Resp 18   Ht 5' 2\" (1.575 m)   Wt 135 lb 12.8 oz (61.6 kg)   [facility] 11/14/2022   SpO2 98%   BMI 24.84 kg/m²   Date 11/21/22 0000 - 11/21/22 2359   Shift 7691-4644 0797-8268 0255-5315 24 Hour Total   INTAKE   Shift Total(mL/kg) OUTPUT   Urine(mL/kg/hr) 1000(2)   1000   Shift Total(mL/kg) 1000(16.2)   1000(16.2)   Weight (kg) 61.6 61.6 61.6 61.6       Constitutional: Well developed. Well nourished. Non-toxic appearance. No acute distress. HENT: Normocephalic. Atraumatic. Bilateral external ears normal, Oropharynx moist.  No oral exudate. Nose normal.   Eyes: No  Scleral icterus    Cardiovascular: RRR  Thorax & Lungs: Non labored at rest, no respiratory distress  Abdomen: + BS, soft, NT. No guarding, rebound tenderness. No hepatomegaly. No splenomegaly. No ascites  Rectal:  Deferred. Skin: Warm, dry. No erythema. No rash. Extremities: No edema. Neurologic: Alert & oriented x 3    RESULTS    Lab Results   Component Value Date    ALT 20 11/20/2022    AST 22 11/20/2022    ALKPHOS 65 11/20/2022    PROT 7.0 11/20/2022    LABALBU 4.4 11/20/2022    LIPASE 14.0 11/20/2022     Lab Results   Component Value Date    WBC 11.9 (H) 11/21/2022    HGB 12.9 11/21/2022    HCT 38.8 11/21/2022    MCV 89.4 11/21/2022     11/21/2022     Lab Results   Component Value Date    CREATININE <0.5 (L) 11/21/2022    BUN 3 (L) 11/21/2022     11/21/2022    K 3.3 (L) 11/21/2022     11/21/2022    CO2 21 11/21/2022       IMAGES  US GALLBLADDER RUQ    Result Date: 11/20/2022  No cholelithiasis or significant dilation of the common duct. 4 cm hypoechoic focus in the medial aspect of the right lobe of the liver. This is most likely focal fat infiltration. Non-urgent follow-up CT liver is recommended. Assessment:  1. Intractable nausea, vomiting epigastric pain. Occurs around menstrual cycle and in stressful situation. Negative EGD and HIDA scan. Very possible this is all related to stress/ functional (functional dyspepsia). Gastroparesis consideration as well. Reglan appears to be helping since being switched to IV. Her HgbA1c was < 6 last month. 2. DM. Follows with [facility]ocrinology at [facility].     Plan:  1. Supportive care. Continue scheduled IV reglan, Zofran as needed. Clear liquid diet and can advance as tolerated. 2. Consider gastric emptying scan outpatient, once she is feeling well enough that Reglan can be stopped for 72 hours. 3. Consider addition of anti-anxiety medication. Buspar may be a good a option as it can also be of benefit in treatment of functional dyspepsia. She was going to bring up her anxiety up to her PCP at her next office appointment. 4. Also recommend follow-up with her gynecologist.       1.  The patient indicates understanding of these issues and agrees with the plan. 2.  I reviewed the patient's medical information and medical history. 3.  I have reviewed the past medical, family, and social history sections including the medications and allergies listed in the above medical record. Thank you for permitting us to participate in the care of your patient. Please do not hesitate to call with questions or concerns.        Electronically signed by: FIDENCIO Stevens 11/21/2022 9:04 AM

## 2022-11-21 NOTE — PROGRESS NOTES
PT A/O. VSS. SHIFT ASSESSMENT COMPLETE AND CHARTED. Pt states reglan helps keep pain and nausea at bay. Pt ambulated in [place] with sister multiple times throughout shift. BS active. Advanced to full liquid and pt taking it easy for now. Pt stable and denied needs when writer left room.

## 2022-11-21 NOTE — PROGRESS NOTES
Pt arrived to c3. Orientated to room and surrounding. Pt alert and orientated. Call light within reach.

## 2022-11-21 NOTE — PROGRESS NOTES
Comprehensive Nutrition Assessment    Type and Reason for Visit:  Initial, Positive Nutrition Screen    Nutrition Recommendations/Plan:   Continue full liquid diet, remove carb-control. Monitor blood sugars. ADAT per MD.  Start Glucerna ONS BID. Monitor acceptance. RD to order updated wt. Monitor nutrition adequacy, pertinent labs, bowel habits, wt changes, and clinical progress     Malnutrition Assessment:  Malnutrition Status: At risk for malnutrition (Comment) (11/21/22 1251)    Context:  Acute Illness       Nutrition Assessment:    Initial/Positive Screen (MST=3, wt loss, decreased appetite): 25 y.o. F admitted w/ N/V. PMHx of type 1 DM. Pt reports she has had bouts of N/V for the past 3 months ongoing. Starting this past Friday, N/V intensified, was not able to keep much food down during this time. Pt denies wt loss during this time, KAITLIN wt loss d/t no significant wt hx in EMR. RD to order new updated wt. Pt reports last emesis episode at 8pm last night, denies nausea this AM. Pt tolerating clear liquid diet, with 3 carb choices, at time of visit. Pt upgraded to full liquid diet this afternoon. BG WNL, will remove carb control. Pt agreeable to trial ONS, will add Glucerna chocolate ONS BID. Will monitor blood sugars and further diet advancement. Nutrition Related Findings:    +BM 11/20. No edema noted. K 3.3. BG  x 24 hrs. Wound Type: None       Current Nutrition Intake & Therapies:    Average Meal Intake: Unable to assess  Average Supplements Intake: None Ordered  ADULT DIET; Full Liquid  ADULT ORAL NUTRITION SUPPLEMENT; Dinner, Breakfast; Diabetic Oral Supplement    Anthropometric Measures:  Height: 5' 2\" (157.5 cm)  Ideal Body Weight (IBW): 110 lbs (50 kg)       Current Body Weight: 135 lb 12.8 oz (61.6 kg), 123.5 % IBW.  Weight Source: Not Specified (11/20/22)  Current BMI (kg/m2): 24.8        Weight Adjustment For: No Adjustment                 BMI Categories: Normal Weight (BMI 18.5-24. 9)    Estimated Daily Nutrient Needs:  Energy Requirements Based On: Kcal/kg (25-30 kcal/kg)  Weight Used for Energy Requirements: Current  Energy (kcal/day): 5251-0329  Weight Used for Protein Requirements: Current (1-1.2 g/kg)  Protein (g/day): 62-74 g  Method Used for Fluid Requirements: 1 ml/kcal  Fluid (ml/day): 7635-1544    Nutrition Diagnosis:   Inadequate oral intake related to altered GI function as evidenced by nausea, vomiting, poor intake prior to admission, GI abnormality    Nutrition Interventions:   Food and/or Nutrient Delivery: Start Oral Diet, Start Oral Nutrition Supplement (ADAT per MD)  Nutrition Education/Counseling: Education not indicated  Coordination of Nutrition Care: Continue to monitor while inpatient       Goals:     Goals: Initiate PO diet, within 2 days       Nutrition Monitoring and Evaluation:   Behavioral-Environmental Outcomes: None Identified  Food/Nutrient Intake Outcomes: Diet Advancement/Tolerance, Food and Nutrient Intake, Supplement Intake  Physical Signs/Symptoms Outcomes: Biochemical Data, Nutrition Focused Physical Findings, Weight, GI Status, Nausea or Vomiting    Discharge Planning:     Too soon to determine     [id number] [place] [address] Avenue: Office: [phone]; [address]: [phone]

## 2022-11-21 NOTE — PROGRESS NOTES
Hospitalist Progress Note      PCP: Timothy Ceballos MD    Date of Admission: 11/20/2022    Chief Complaint: Nausea vomiting       Hospital Course: reviewed     Subjective: feeling a bit better today, denies abd pain, sister at bedside       Medications:  Reviewed    Infusion Medications    dextrose      sodium chloride      sodium chloride 100 mL/hr at 11/21/22 0620     Scheduled Medications    sodium chloride flush  5-40 mL IntraVENous 2 times per day    enoxaparin  40 mg SubCUTAneous Daily    metoclopramide  10 mg IntraVENous TID    pantoprazole  40 mg IntraVENous Daily     PRN Meds: glucose, dextrose bolus **OR** dextrose bolus, glucagon (rDNA), dextrose, sodium chloride flush, sodium chloride, ondansetron **OR** ondansetron, polyethylene glycol, acetaminophen **OR** acetaminophen, potassium chloride **OR** potassium alternative oral replacement **OR** potassium chloride      Intake/Output Summary (Last 24 hours) at 11/21/2022 1122  Last data filed at 11/21/2022 0943  Gross per 24 hour   Intake 2343 ml   Output 1000 ml   Net 1343 ml       Physical Exam Performed:    /78   Pulse 73   Temp 98.4 °F (36.9 °C) (Oral)   Resp 18   Ht 5' 2\" (1.575 m)   Wt 135 lb 12.8 oz (61.6 kg)   LMP 11/14/2022   SpO2 98%   BMI 24.84 kg/m²     General appearance: No apparent distress, appears stated age and cooperative. HEENT: Pupils equal, round, and reactive to light. Conjunctivae/corneas clear. Neck: Supple, with full range of motion. No jugular venous distention. Trachea midline. Respiratory:  Normal respiratory effort. Clear to auscultation, bilaterally without Rales/Wheezes/Rhonchi. Cardiovascular: Regular rate and rhythm with normal S1/S2 without murmurs, rubs or gallops. Abdomen: Soft, non-tender, non-distended with normal bowel sounds. Musculoskeletal: No clubbing, cyanosis or edema bilaterally. Full range of motion without deformity.   Skin: Skin color, texture, turgor normal.  No rashes or lesions. Neurologic:  Neurovascularly intact without any focal sensory/motor deficits. Cranial nerves: II-XII intact, grossly non-focal.  Psychiatric: Alert and oriented, thought content appropriate, normal insight  Capillary Refill: Brisk, 3 seconds, normal   Peripheral Pulses: +2 palpable, equal bilaterally       Labs:   Recent Labs     11/20/22  1610 11/21/22  0521   WBC 14.7* 11.9*   HGB 13.5 12.9   HCT 39.3 38.8    335     Recent Labs     11/20/22  1610 11/21/22  0521   * 136   K 3.3* 3.3*   CL 96* 102   CO2 18* 21   BUN 9 3*   CREATININE 0.6 <0.5*   CALCIUM 9.4 8.3     Recent Labs     11/20/22  1610   AST 22   ALT 20   BILITOT 0.7   ALKPHOS 65     No results for input(s): INR in the last 72 hours. Recent Labs     11/20/22  1610   TROPONINI <0.01       Urinalysis:      Lab Results   Component Value Date/Time    NITRU Negative 11/20/2022 04:10 PM    WBCUA 3-5 11/20/2022 04:10 PM    BACTERIA 2+ 11/20/2022 04:10 PM    RBCUA 3-4 11/20/2022 04:10 PM    BLOODU TRACE-INTACT 11/20/2022 04:10 PM    SPECGRAV >=1.030 11/20/2022 04:10 PM    GLUCOSEU Negative 11/20/2022 04:10 PM       Radiology:  US GALLBLADDER RUQ   Final Result   No cholelithiasis or significant dilation of the common duct. 4 cm hypoechoic focus in the medial aspect of the right lobe of the liver. This is most likely focal fat infiltration. Non-urgent follow-up CT liver is   recommended. IP CONSULT TO GI    Assessment/Plan:    Active Hospital Problems    Diagnosis     Intractable nausea and vomiting [R11.2]      Priority: Medium     Abd pain/n/v- unclear etiology, ongoing for approx 3 months per pt, pt with history of insulin-dependent diabetes mellitus type 1 since age 7 on insulin pump with intractable nausea vomiting   - started on IV fluid, clear liquids as tolerated, patient asked to  manage her own insulin pump, recent hemoglobin A1c per mother 2 weeks ago 6.0.    -Started on Reglan IV 10 mg 3 times daily.   Zofran

## 2022-11-21 NOTE — CONSULTS
Consult placed  Perfect serve to Hector Vargas  Who:Dr. Cathy Powell   Date:11/21/2022,  Time:7:45 AM        Electronically signed by Ellie Flannery on 11/21/2022 at 7:45 AM

## 2022-11-21 NOTE — PROGRESS NOTES
Pt c/o severe abdominal pain like she had when she arrived to ER. States that Reglan has been helping but Bentyl did not do anything to relieve the pain. Order received from [facility] NP for one time dose of Reglan IV.

## 2022-11-22 VITALS
HEART RATE: 90 BPM | WEIGHT: 131.9 LBS | SYSTOLIC BLOOD PRESSURE: 162 MMHG | BODY MASS INDEX: 24.27 KG/M2 | RESPIRATION RATE: 16 BRPM | HEIGHT: 62 IN | OXYGEN SATURATION: 98 % | TEMPERATURE: 98 F | DIASTOLIC BLOOD PRESSURE: 93 MMHG

## 2022-11-22 LAB
GLUCOSE BLD-MCNC: 119 MG/DL (ref 70–99)
GLUCOSE BLD-MCNC: 169 MG/DL (ref 70–99)
PERFORMED ON: ABNORMAL
PERFORMED ON: ABNORMAL

## 2022-11-22 PROCEDURE — 6360000002 HC RX W HCPCS: Performed by: HOSPITALIST

## 2022-11-22 PROCEDURE — 2580000003 HC RX 258: Performed by: HOSPITALIST

## 2022-11-22 PROCEDURE — G0378 HOSPITAL OBSERVATION PER HR: HCPCS

## 2022-11-22 PROCEDURE — 96361 HYDRATE IV INFUSION ADD-ON: CPT

## 2022-11-22 PROCEDURE — 6370000000 HC RX 637 (ALT 250 FOR IP): Performed by: NURSE PRACTITIONER

## 2022-11-22 RX ORDER — PANTOPRAZOLE SODIUM 40 MG/1
40 TABLET, DELAYED RELEASE ORAL
Status: DISCONTINUED | OUTPATIENT
Start: 2022-11-22 | End: 2022-11-22 | Stop reason: HOSPADM

## 2022-11-22 RX ORDER — METOCLOPRAMIDE 10 MG/1
10 TABLET ORAL
Status: DISCONTINUED | OUTPATIENT
Start: 2022-11-22 | End: 2022-11-22 | Stop reason: HOSPADM

## 2022-11-22 RX ORDER — PANTOPRAZOLE SODIUM 40 MG/1
40 TABLET, DELAYED RELEASE ORAL
Status: DISCONTINUED | OUTPATIENT
Start: 2022-11-23 | End: 2022-11-22

## 2022-11-22 RX ORDER — METOCLOPRAMIDE 10 MG/1
10 TABLET ORAL 3 TIMES DAILY PRN
Qty: 42 TABLET | Refills: 0 | Status: SHIPPED | OUTPATIENT
Start: 2022-11-22 | End: 2022-12-06

## 2022-11-22 RX ADMIN — METOCLOPRAMIDE 10 MG: 10 TABLET ORAL at 10:21

## 2022-11-22 RX ADMIN — PANTOPRAZOLE SODIUM 40 MG: 40 TABLET, DELAYED RELEASE ORAL at 10:29

## 2022-11-22 RX ADMIN — SODIUM CHLORIDE, PRESERVATIVE FREE 10 ML: 5 INJECTION INTRAVENOUS at 10:24

## 2022-11-22 RX ADMIN — SODIUM CHLORIDE: 9 INJECTION, SOLUTION INTRAVENOUS at 03:08

## 2022-11-22 NOTE — DISCHARGE SUMMARY
Hospital Medicine Discharge Summary    Patient ID: Naseem Mccord      Patient's PCP: Clement Kline MD    Admit Date: 11/20/2022     Discharge Date: 11/22/2022      Admitting Provider: No admitting provider for patient encounter. Discharge Provider: Patrice Presley MD     Discharge Diagnoses: Active Hospital Problems    Diagnosis     Intractable nausea and vomiting [R11.2]      Priority: Medium       The patient was seen and examined on day of discharge and this discharge summary is in conjunction with any daily progress note from day of discharge. Hospital Course:   History Of Present Illness:       25 y.o. female who presented to [facility] with nausea vomiting started since Friday unable to keep anything down came to the emergency room, patient was noted to have hypokalemia no DKA, patient with a history of insulin-dependent diabetes mellitus since age 7 followed by endocrinologist at [facility] Dr. Marisol Funez on insulin pump, patient is stated recently she changed her birth control from NuvaRing to start on [facility] 3 weeks ago started having nausea on Friday. Patient denies any fever chills no chest pain or shortness of breath no cough no sputum positive nausea vomiting denies any diarrhea. She does not to smoke or drink alcohol. Abd pain/n/v- resolved, unclear etiology, ongoing for approx 3 months per pt, pt with history of insulin-dependent diabetes mellitus type 1 since age 7 on insulin pump with intractable nausea vomiting   - started on IV fluid, clear liquids as tolerated, patient asked to  manage her own insulin pump, recent hemoglobin A1c per mother 2 weeks ago 6.0.    -Started on Reglan IV 10 mg 3 times daily.   Zofran as needed -consulted GI. felt element of menstrual cycle and stress, ddx included gastroparesis/IBS/functional causes, plan to arrange gastric emptying study as outpt  -instructed to use reglan tid PRN and stop 3 days before testing(pt is not on narcotics)    Hypokalemia - supplemented with IV potassium. Labs montiored     Contraception/Birth control- continued with OCP ,pregnancy test was negative in the emergency room. Physical Exam Performed:     BP (!) 162/93   Pulse 90   Temp 98 °F (36.7 °C) (Oral)   Resp 16   Ht 5' 2\" (1.575 m)   Wt 131 lb 14.4 oz (59.8 kg)   LMP 11/14/2022   SpO2 98%   BMI 24.12 kg/m²       General appearance:  No apparent distress, appears stated age and cooperative. HEENT:  Normal cephalic, atraumatic without obvious deformity. Pupils equal, round, and reactive to light. Extra ocular muscles intact. Conjunctivae/corneas clear. Neck: Supple, with full range of motion. No jugular venous distention. Trachea midline. Respiratory:  Normal respiratory effort. Clear to auscultation, bilaterally without Rales/Wheezes/Rhonchi. Cardiovascular:  Regular rate and rhythm with normal S1/S2 without murmurs, rubs or gallops. Abdomen: Soft, non-tender, non-distended with normal bowel sounds. Musculoskeletal:  No clubbing, cyanosis or edema bilaterally. Full range of motion without deformity. Skin: Skin color, texture, turgor normal.  No rashes or lesions. Neurologic:  Neurovascularly intact without any focal sensory/motor deficits. Cranial nerves: II-XII intact, grossly non-focal.  Psychiatric:  Alert and oriented, thought content appropriate, normal insight  Capillary Refill: Brisk,< 3 seconds   Peripheral Pulses: +2 palpable, equal bilaterally       Labs:  For convenience and continuity at follow-up the following most recent labs are provided:      CBC:    Lab Results   Component Value Date/Time    WBC 11.9 11/21/2022 05:21 AM    HGB 12.9 11/21/2022 05:21 AM    HCT 38.8 11/21/2022 05:21 AM     11/21/2022 05:21 AM       Renal:    Lab Results   Component Value Date/Time     11/21/2022 05:21 AM    K 3.3 11/21/2022 05:21 AM     11/21/2022 05:21 AM    CO2 21 11/21/2022 05:21 AM    BUN 3 11/21/2022 05:21 AM CREATININE <0.5 11/21/2022 05:21 AM    CALCIUM 8.3 11/21/2022 05:21 AM         Significant Diagnostic Studies    Radiology:   US GALLBLADDER RUQ   Final Result   No cholelithiasis or significant dilation of the common duct. 4 cm hypoechoic focus in the medial aspect of the right lobe of the liver. This is most likely focal fat infiltration. Non-urgent follow-up CT liver is   recommended. NM GASTRIC EMPTYING    (Results Pending)          Consults:     IP CONSULT TO GI    Disposition:  home     Condition at Discharge: Stable    Discharge Instructions/Follow-up:  GI as outpt    Code Status:  Full Code     Activity: activity as tolerated    Diet: regular diet      Discharge Medications:     Discharge Medication List as of 11/22/2022  2:54 PM             Details   metoclopramide (REGLAN) 10 MG tablet Take 1 tablet by mouth 3 times daily as needed (nausea/vomiting), Disp-42 tablet, R-0Normal                Details   HUMALOG 100 UNIT/ML injection vial DAWHistorical Med      amitriptyline (ELAVIL) 50 MG tablet Historical Med      SPRINTEC 28 0.25-35 MG-MCG per tablet DAWHistorical Med      insulin glargine (LANTUS) 100 UNIT/ML injection Inject  into the skin nightly. Time Spent on discharge: 32 min in the examination, evaluation, counseling and review of medications and discharge plan. Signed:    Antoinette Muñoz MD   11/22/2022      Thank you Rosas Villeda MD for the opportunity to be involved in this patient's care. If you have any questions or concerns, please feel free to contact me at [phone].

## 2022-11-22 NOTE — CARE COORDINATION
Hospital day 2 in OBS status: Patient on c3 re intractable nausea and vomiting care managed by IM and GI. Patient from home with grandparents, Olivia Ibrahim. Dc pending diet tolerance/nausea . No CM needs. JOVAN Mckenzie

## 2022-11-22 NOTE — PROGRESS NOTES
Assumed care of this pt. Pt asleep at this time. Family at bedside. Agreeable with previous assessment.

## 2022-11-22 NOTE — PROGRESS NOTES
D/c pt with mom. Script sent to [facility] and they called and said it will be ready tomorrow. PIV removed without difficulty. Pt verbalized understanding of setting up outpt GES and staying off reglan 72 hours prior.

## 2022-11-22 NOTE — DISCHARGE INSTRUCTIONS
Follow up with GI for gastric emptying study and stop reglan 72 hours prior to study. Follow up with pcp in 1 week or so. MD at bedside

## 2022-11-22 NOTE — PROGRESS NOTES
PROGRESS NOTE    HPI: Juan Hall is a(n)22 y.o. female admitted for work-up and treatment for Hypokalemia [X12.3]  Metabolic alkalosis [O29.4]  Hypomagnesemia [E83.42]  Intractable nausea and vomiting [R11.2]  Leukocytosis, unspecified type [D72.829]. We are following for intractable nausea, vomiting. Subjective:     Says symptoms have been resolved since yesterday morning. Was advanced to full liquid diet and did well. No other complaints. Mother at bedside and makes mention that processed garlic seems to trigger abdominal pain and diarrhea. Objective:     I/O last 3 completed shifts: In: 1823 [P.O.:480; I.V.:1343]  Out: 1000 [Urine:1000]      BP (!) 150/90   Pulse 67   Temp 97.7 °F (36.5 °C) (Oral)   Resp 18   Ht 5' 2\" (1.575 m)   Wt 131 lb 14.4 oz (59.8 kg)   LMP 11/14/2022   SpO2 98%   BMI 24.12 kg/m²     Physical Exam:  HEENT: anicteric sclera, oropharyngeal membranes pink and moist.  Cor: RRR  Lungs: non-labored, no respiratory distress  Abdomen: soft, NT. No ascites. No hepatomegaly or splenomegaly  Extremities: no edema  Neuro: alert and oriented x 3      Results:   Lab Results   Component Value Date    ALT 20 11/20/2022    AST 22 11/20/2022    ALKPHOS 65 11/20/2022    PROT 7.0 11/20/2022    LABALBU 4.4 11/20/2022    LIPASE 14.0 11/20/2022     Lab Results   Component Value Date    WBC 11.9 (H) 11/21/2022    HGB 12.9 11/21/2022    HCT 38.8 11/21/2022    MCV 89.4 11/21/2022     11/21/2022     BUN/Cr/glu/ALT/AST/amyl/lip:  3/<0.5/--/--/--/--/-- (11/21 0521)  US GALLBLADDER RUQ    Result Date: 11/20/2022  No cholelithiasis or significant dilation of the common duct. 4 cm hypoechoic focus in the medial aspect of the right lobe of the liver. This is most likely focal fat infiltration. Non-urgent follow-up CT liver is recommended. Impression:  1. Intractable nausea, vomiting epigastric pain.  Occurs around menstrual cycle and in stressful situations. Negative EGD and HIDA scan. CTs largely unremarkable. Gastroparesis, IBS, functional causes possible. Her HgbA1c was < 6 last month. Has improved with IV hydration, antiemetics and reglan. Also to follow-up with her gynecologist.     2. DM. Follows with endocrinology at [facility].     3. Worsening anxiety. Plans to bring this up to her PCP. Plan:  Advance diet. Okay to discharge home once tolerating. Continue 10 mg Reglan tid. GES ordered for outpatient testing. Instructed to stop Reglan 3 days before. She is not on narcotics. Patient in mother concerned about food allergy, to garlic in particular. Allergy is rare, intolerance to garlic more common. Pending further work-up, if issues persist can refer to allergist.     Please do not hesitate to call with questions or concerns.       Electronically signed by: FIDENCIO Ochoa 11/22/2022 9:32 AM

## 2023-01-28 ENCOUNTER — HOSPITAL ENCOUNTER (INPATIENT)
Age: 23
LOS: 3 days | Discharge: OTHER INSTITUTION | DRG: 074 | End: 2023-01-31
Attending: EMERGENCY MEDICINE | Admitting: INTERNAL MEDICINE
Payer: COMMERCIAL

## 2023-01-28 ENCOUNTER — APPOINTMENT (OUTPATIENT)
Dept: CT IMAGING | Age: 23
DRG: 074 | End: 2023-01-28
Payer: COMMERCIAL

## 2023-01-28 ENCOUNTER — HOSPITAL ENCOUNTER (EMERGENCY)
Age: 23
Discharge: HOME | DRG: 074 | End: 2023-01-28
Attending: EMERGENCY MEDICINE
Payer: COMMERCIAL

## 2023-01-28 VITALS
HEIGHT: 62 IN | OXYGEN SATURATION: 98 % | HEART RATE: 78 BPM | BODY MASS INDEX: 25.76 KG/M2 | RESPIRATION RATE: 16 BRPM | DIASTOLIC BLOOD PRESSURE: 71 MMHG | WEIGHT: 140 LBS | TEMPERATURE: 99 F | SYSTOLIC BLOOD PRESSURE: 105 MMHG

## 2023-01-28 DIAGNOSIS — R10.30 LOWER ABDOMINAL PAIN: ICD-10-CM

## 2023-01-28 DIAGNOSIS — R10.9 ABDOMINAL PAIN, UNSPECIFIED ABDOMINAL LOCATION: ICD-10-CM

## 2023-01-28 DIAGNOSIS — R11.2 NAUSEA AND VOMITING, UNSPECIFIED VOMITING TYPE: Primary | ICD-10-CM

## 2023-01-28 DIAGNOSIS — R11.2 INTRACTABLE NAUSEA AND VOMITING: Primary | ICD-10-CM

## 2023-01-28 PROBLEM — R11.15 CYCLICAL VOMITING, INTRACTABLE: Status: ACTIVE | Noted: 2023-01-28

## 2023-01-28 LAB
A/G RATIO: 1.8 (ref 1.1–2.2)
A/G RATIO: 2 (ref 1.1–2.2)
ALBUMIN SERPL-MCNC: 5 G/DL (ref 3.4–5)
ALBUMIN SERPL-MCNC: 5.1 G/DL (ref 3.4–5)
ALP BLD-CCNC: 65 U/L (ref 40–129)
ALP BLD-CCNC: 71 U/L (ref 40–129)
ALT SERPL-CCNC: 12 U/L (ref 10–40)
ALT SERPL-CCNC: 13 U/L (ref 10–40)
AMPHETAMINE SCREEN, URINE: ABNORMAL
ANION GAP SERPL CALCULATED.3IONS-SCNC: 13 MMOL/L (ref 3–16)
ANION GAP SERPL CALCULATED.3IONS-SCNC: 18 MMOL/L (ref 3–16)
ANION GAP SERPL CALCULATED.3IONS-SCNC: 20 MMOL/L (ref 3–16)
ANION GAP SERPL CALCULATED.3IONS-SCNC: 22 MMOL/L (ref 3–16)
AST SERPL-CCNC: 17 U/L (ref 15–37)
AST SERPL-CCNC: 17 U/L (ref 15–37)
BACTERIA: ABNORMAL /HPF
BARBITURATE SCREEN URINE: ABNORMAL
BASE EXCESS VENOUS: -5.6 MMOL/L (ref -3–3)
BASE EXCESS VENOUS: -7.5 MMOL/L (ref -3–3)
BASOPHILS ABSOLUTE: 0.1 K/UL (ref 0–0.2)
BASOPHILS ABSOLUTE: 0.1 K/UL (ref 0–0.2)
BASOPHILS RELATIVE PERCENT: 0.3 %
BASOPHILS RELATIVE PERCENT: 0.6 %
BENZODIAZEPINE SCREEN, URINE: ABNORMAL
BETA-HYDROXYBUTYRATE: 2.73 MMOL/L (ref 0–0.27)
BETA-HYDROXYBUTYRATE: 2.92 MMOL/L (ref 0–0.27)
BILIRUB SERPL-MCNC: 0.4 MG/DL (ref 0–1)
BILIRUB SERPL-MCNC: 0.6 MG/DL (ref 0–1)
BILIRUBIN URINE: NEGATIVE
BILIRUBIN URINE: NEGATIVE
BLOOD, URINE: ABNORMAL
BLOOD, URINE: NEGATIVE
BUN BLDV-MCNC: 11 MG/DL (ref 7–20)
BUN BLDV-MCNC: 13 MG/DL (ref 7–20)
BUN BLDV-MCNC: 7 MG/DL (ref 7–20)
BUN BLDV-MCNC: 9 MG/DL (ref 7–20)
CALCIUM SERPL-MCNC: 10.1 MG/DL (ref 8.3–10.6)
CALCIUM SERPL-MCNC: 7.4 MG/DL (ref 8.3–10.6)
CALCIUM SERPL-MCNC: 9.3 MG/DL (ref 8.3–10.6)
CALCIUM SERPL-MCNC: 9.6 MG/DL (ref 8.3–10.6)
CANNABINOID SCREEN URINE: POSITIVE
CARBOXYHEMOGLOBIN: 1.1 % (ref 0–1.5)
CARBOXYHEMOGLOBIN: 1.1 % (ref 0–1.5)
CHLORIDE BLD-SCNC: 103 MMOL/L (ref 99–110)
CHLORIDE BLD-SCNC: 112 MMOL/L (ref 99–110)
CHLORIDE BLD-SCNC: 97 MMOL/L (ref 99–110)
CHLORIDE BLD-SCNC: 98 MMOL/L (ref 99–110)
CLARITY: CLEAR
CLARITY: CLEAR
CO2: 16 MMOL/L (ref 21–32)
CO2: 18 MMOL/L (ref 21–32)
COCAINE METABOLITE SCREEN URINE: ABNORMAL
COLOR: YELLOW
COLOR: YELLOW
CREAT SERPL-MCNC: 0.6 MG/DL (ref 0.6–1.1)
CREAT SERPL-MCNC: 0.6 MG/DL (ref 0.6–1.1)
CREAT SERPL-MCNC: 0.7 MG/DL (ref 0.6–1.1)
CREAT SERPL-MCNC: <0.5 MG/DL (ref 0.6–1.1)
EKG ATRIAL RATE: 58 BPM
EKG DIAGNOSIS: NORMAL
EKG P AXIS: 76 DEGREES
EKG P-R INTERVAL: 150 MS
EKG Q-T INTERVAL: 452 MS
EKG QRS DURATION: 84 MS
EKG QTC CALCULATION (BAZETT): 443 MS
EKG R AXIS: 56 DEGREES
EKG T AXIS: 67 DEGREES
EKG VENTRICULAR RATE: 58 BPM
EOSINOPHILS ABSOLUTE: 0 K/UL (ref 0–0.6)
EOSINOPHILS ABSOLUTE: 0 K/UL (ref 0–0.6)
EOSINOPHILS RELATIVE PERCENT: 0 %
EOSINOPHILS RELATIVE PERCENT: 0.1 %
EPITHELIAL CELLS, UA: ABNORMAL /HPF (ref 0–5)
EPITHELIAL CELLS, UA: NORMAL /HPF (ref 0–5)
FENTANYL SCREEN, URINE: ABNORMAL
GFR SERPL CREATININE-BSD FRML MDRD: >60 ML/MIN/{1.73_M2}
GLUCOSE BLD-MCNC: 142 MG/DL (ref 70–99)
GLUCOSE BLD-MCNC: 151 MG/DL
GLUCOSE BLD-MCNC: 151 MG/DL (ref 70–99)
GLUCOSE BLD-MCNC: 191 MG/DL
GLUCOSE BLD-MCNC: 196 MG/DL (ref 70–99)
GLUCOSE BLD-MCNC: 196 MG/DL (ref 70–99)
GLUCOSE BLD-MCNC: 200 MG/DL (ref 70–99)
GLUCOSE BLD-MCNC: 208 MG/DL (ref 70–99)
GLUCOSE BLD-MCNC: 221 MG/DL (ref 70–99)
GLUCOSE BLD-MCNC: 97 MG/DL (ref 70–99)
GLUCOSE BLD-MCNC: 98 MG/DL (ref 70–99)
GLUCOSE URINE: 250 MG/DL
GLUCOSE URINE: 500 MG/DL
HCG QUALITATIVE: NEGATIVE
HCO3 VENOUS: 16.3 MMOL/L (ref 23–29)
HCO3 VENOUS: 18.3 MMOL/L (ref 23–29)
HCT VFR BLD CALC: 39.4 % (ref 36–48)
HCT VFR BLD CALC: 44 % (ref 36–48)
HEMOGLOBIN: 12.9 G/DL (ref 12–16)
HEMOGLOBIN: 14.6 G/DL (ref 12–16)
KETONES, URINE: >=80 MG/DL
KETONES, URINE: >=80 MG/DL
LACTIC ACID, SEPSIS: 1.2 MMOL/L (ref 0.4–1.9)
LACTIC ACID, SEPSIS: 2.1 MMOL/L (ref 0.4–1.9)
LEUKOCYTE ESTERASE, URINE: NEGATIVE
LEUKOCYTE ESTERASE, URINE: NEGATIVE
LIPASE: 6 U/L (ref 13–60)
LYMPHOCYTES ABSOLUTE: 0.9 K/UL (ref 1–5.1)
LYMPHOCYTES ABSOLUTE: 0.9 K/UL (ref 1–5.1)
LYMPHOCYTES RELATIVE PERCENT: 4.9 %
LYMPHOCYTES RELATIVE PERCENT: 6.6 %
Lab: ABNORMAL
MCH RBC QN AUTO: 29.2 PG (ref 26–34)
MCH RBC QN AUTO: 29.4 PG (ref 26–34)
MCHC RBC AUTO-ENTMCNC: 32.6 G/DL (ref 31–36)
MCHC RBC AUTO-ENTMCNC: 33.1 G/DL (ref 31–36)
MCV RBC AUTO: 88.8 FL (ref 80–100)
MCV RBC AUTO: 89.4 FL (ref 80–100)
METHADONE SCREEN, URINE: ABNORMAL
METHEMOGLOBIN VENOUS: 0.3 %
METHEMOGLOBIN VENOUS: 0.6 %
MICROSCOPIC EXAMINATION: YES
MICROSCOPIC EXAMINATION: YES
MONOCYTES ABSOLUTE: 0.4 K/UL (ref 0–1.3)
MONOCYTES ABSOLUTE: 0.4 K/UL (ref 0–1.3)
MONOCYTES RELATIVE PERCENT: 2.4 %
MONOCYTES RELATIVE PERCENT: 2.5 %
NEUTROPHILS ABSOLUTE: 12.8 K/UL (ref 1.7–7.7)
NEUTROPHILS ABSOLUTE: 17.4 K/UL (ref 1.7–7.7)
NEUTROPHILS RELATIVE PERCENT: 90.2 %
NEUTROPHILS RELATIVE PERCENT: 92.4 %
NITRITE, URINE: NEGATIVE
NITRITE, URINE: NEGATIVE
O2 SAT, VEN: 81 %
O2 SAT, VEN: 99 %
O2 THERAPY: ABNORMAL
O2 THERAPY: ABNORMAL
OPIATE SCREEN URINE: POSITIVE
OXYCODONE URINE: ABNORMAL
PCO2, VEN: 28.7 MMHG (ref 40–50)
PCO2, VEN: 31.7 MMHG (ref 40–50)
PDW BLD-RTO: 13.4 % (ref 12.4–15.4)
PDW BLD-RTO: 13.5 % (ref 12.4–15.4)
PERFORMED ON: ABNORMAL
PERFORMED ON: NORMAL
PH UA: 5.5
PH UA: 5.5 (ref 5–8)
PH UA: 6 (ref 5–8)
PH VENOUS: 7.37 (ref 7.35–7.45)
PH VENOUS: 7.38 (ref 7.35–7.45)
PHENCYCLIDINE SCREEN URINE: ABNORMAL
PHOSPHORUS: 3.5 MG/DL (ref 2.5–4.9)
PLATELET # BLD: 376 K/UL (ref 135–450)
PLATELET # BLD: 395 K/UL (ref 135–450)
PMV BLD AUTO: 8.3 FL (ref 5–10.5)
PMV BLD AUTO: 8.6 FL (ref 5–10.5)
PO2, VEN: 147.3 MMHG (ref 25–40)
PO2, VEN: 45.1 MMHG (ref 25–40)
POTASSIUM REFLEX MAGNESIUM: 4.2 MMOL/L (ref 3.5–5.1)
POTASSIUM REFLEX MAGNESIUM: 4.2 MMOL/L (ref 3.5–5.1)
POTASSIUM REFLEX MAGNESIUM: 4.4 MMOL/L (ref 3.5–5.1)
POTASSIUM SERPL-SCNC: 4.7 MMOL/L (ref 3.5–5.1)
PROTEIN UA: 100 MG/DL
PROTEIN UA: ABNORMAL MG/DL
RBC # BLD: 4.41 M/UL (ref 4–5.2)
RBC # BLD: 4.96 M/UL (ref 4–5.2)
RBC UA: ABNORMAL /HPF (ref 0–4)
RBC UA: NORMAL /HPF (ref 0–4)
SODIUM BLD-SCNC: 134 MMOL/L (ref 136–145)
SODIUM BLD-SCNC: 137 MMOL/L (ref 136–145)
SODIUM BLD-SCNC: 139 MMOL/L (ref 136–145)
SODIUM BLD-SCNC: 143 MMOL/L (ref 136–145)
SPECIFIC GRAVITY UA: 1.02 (ref 1–1.03)
SPECIFIC GRAVITY UA: >=1.03 (ref 1–1.03)
TCO2 CALC VENOUS: 17 MMOL/L
TCO2 CALC VENOUS: 19 MMOL/L
TOTAL PROTEIN: 7.5 G/DL (ref 6.4–8.2)
TOTAL PROTEIN: 8 G/DL (ref 6.4–8.2)
TROPONIN: <0.01 NG/ML
URINE REFLEX TO CULTURE: ABNORMAL
URINE REFLEX TO CULTURE: ABNORMAL
URINE TYPE: ABNORMAL
URINE TYPE: ABNORMAL
UROBILINOGEN, URINE: 0.2 E.U./DL
UROBILINOGEN, URINE: 0.2 E.U./DL
WBC # BLD: 14.1 K/UL (ref 4–11)
WBC # BLD: 18.8 K/UL (ref 4–11)
WBC UA: ABNORMAL /HPF (ref 0–5)
WBC UA: NORMAL /HPF (ref 0–5)

## 2023-01-28 PROCEDURE — 96376 TX/PRO/DX INJ SAME DRUG ADON: CPT

## 2023-01-28 PROCEDURE — 74177 CT ABD & PELVIS W/CONTRAST: CPT

## 2023-01-28 PROCEDURE — C9113 INJ PANTOPRAZOLE SODIUM, VIA: HCPCS | Performed by: INTERNAL MEDICINE

## 2023-01-28 PROCEDURE — 96375 TX/PRO/DX INJ NEW DRUG ADDON: CPT

## 2023-01-28 PROCEDURE — 85025 COMPLETE CBC W/AUTO DIFF WBC: CPT

## 2023-01-28 PROCEDURE — 2580000003 HC RX 258: Performed by: EMERGENCY MEDICINE

## 2023-01-28 PROCEDURE — 36415 COLL VENOUS BLD VENIPUNCTURE: CPT

## 2023-01-28 PROCEDURE — 93010 ELECTROCARDIOGRAM REPORT: CPT | Performed by: INTERNAL MEDICINE

## 2023-01-28 PROCEDURE — 6360000002 HC RX W HCPCS: Performed by: INTERNAL MEDICINE

## 2023-01-28 PROCEDURE — 83690 ASSAY OF LIPASE: CPT

## 2023-01-28 PROCEDURE — 99285 EMERGENCY DEPT VISIT HI MDM: CPT

## 2023-01-28 PROCEDURE — 82803 BLOOD GASES ANY COMBINATION: CPT

## 2023-01-28 PROCEDURE — 96372 THER/PROPH/DIAG INJ SC/IM: CPT

## 2023-01-28 PROCEDURE — 80053 COMPREHEN METABOLIC PANEL: CPT

## 2023-01-28 PROCEDURE — 93005 ELECTROCARDIOGRAM TRACING: CPT | Performed by: REGISTERED NURSE

## 2023-01-28 PROCEDURE — 81001 URINALYSIS AUTO W/SCOPE: CPT

## 2023-01-28 PROCEDURE — 84703 CHORIONIC GONADOTROPIN ASSAY: CPT

## 2023-01-28 PROCEDURE — 96374 THER/PROPH/DIAG INJ IV PUSH: CPT

## 2023-01-28 PROCEDURE — 6360000002 HC RX W HCPCS: Performed by: STUDENT IN AN ORGANIZED HEALTH CARE EDUCATION/TRAINING PROGRAM

## 2023-01-28 PROCEDURE — 6360000004 HC RX CONTRAST MEDICATION: Performed by: REGISTERED NURSE

## 2023-01-28 PROCEDURE — 6360000002 HC RX W HCPCS: Performed by: EMERGENCY MEDICINE

## 2023-01-28 PROCEDURE — 1200000000 HC SEMI PRIVATE

## 2023-01-28 PROCEDURE — 2580000003 HC RX 258: Performed by: INTERNAL MEDICINE

## 2023-01-28 PROCEDURE — 82010 KETONE BODYS QUAN: CPT

## 2023-01-28 PROCEDURE — 99284 EMERGENCY DEPT VISIT MOD MDM: CPT

## 2023-01-28 PROCEDURE — 80307 DRUG TEST PRSMV CHEM ANLYZR: CPT

## 2023-01-28 PROCEDURE — 84100 ASSAY OF PHOSPHORUS: CPT

## 2023-01-28 PROCEDURE — 84484 ASSAY OF TROPONIN QUANT: CPT

## 2023-01-28 PROCEDURE — 83605 ASSAY OF LACTIC ACID: CPT

## 2023-01-28 PROCEDURE — 96361 HYDRATE IV INFUSION ADD-ON: CPT

## 2023-01-28 PROCEDURE — 6360000002 HC RX W HCPCS: Performed by: REGISTERED NURSE

## 2023-01-28 RX ORDER — ACETAMINOPHEN 650 MG/1
650 SUPPOSITORY RECTAL EVERY 6 HOURS PRN
Status: DISCONTINUED | OUTPATIENT
Start: 2023-01-28 | End: 2023-01-31 | Stop reason: HOSPADM

## 2023-01-28 RX ORDER — METOCLOPRAMIDE HYDROCHLORIDE 5 MG/ML
10 INJECTION INTRAMUSCULAR; INTRAVENOUS ONCE
Status: COMPLETED | OUTPATIENT
Start: 2023-01-28 | End: 2023-01-28

## 2023-01-28 RX ORDER — SODIUM CHLORIDE 0.9 % (FLUSH) 0.9 %
5-40 SYRINGE (ML) INJECTION EVERY 12 HOURS SCHEDULED
Status: DISCONTINUED | OUTPATIENT
Start: 2023-01-28 | End: 2023-01-31 | Stop reason: HOSPADM

## 2023-01-28 RX ORDER — ONDANSETRON 2 MG/ML
4 INJECTION INTRAMUSCULAR; INTRAVENOUS EVERY 6 HOURS PRN
Status: DISCONTINUED | OUTPATIENT
Start: 2023-01-28 | End: 2023-01-31 | Stop reason: HOSPADM

## 2023-01-28 RX ORDER — MORPHINE SULFATE 4 MG/ML
4 INJECTION, SOLUTION INTRAMUSCULAR; INTRAVENOUS ONCE
Status: COMPLETED | OUTPATIENT
Start: 2023-01-28 | End: 2023-01-28

## 2023-01-28 RX ORDER — SODIUM CHLORIDE 9 MG/ML
INJECTION, SOLUTION INTRAVENOUS CONTINUOUS
Status: DISCONTINUED | OUTPATIENT
Start: 2023-01-28 | End: 2023-01-31 | Stop reason: HOSPADM

## 2023-01-28 RX ORDER — PROMETHAZINE HYDROCHLORIDE 25 MG/ML
25 INJECTION, SOLUTION INTRAMUSCULAR; INTRAVENOUS ONCE
Status: COMPLETED | OUTPATIENT
Start: 2023-01-28 | End: 2023-01-28

## 2023-01-28 RX ORDER — DIPHENHYDRAMINE HYDROCHLORIDE 50 MG/ML
12.5 INJECTION INTRAMUSCULAR; INTRAVENOUS ONCE
Status: COMPLETED | OUTPATIENT
Start: 2023-01-28 | End: 2023-01-28

## 2023-01-28 RX ORDER — POLYETHYLENE GLYCOL 3350 17 G/17G
17 POWDER, FOR SOLUTION ORAL DAILY PRN
Status: DISCONTINUED | OUTPATIENT
Start: 2023-01-28 | End: 2023-01-31 | Stop reason: HOSPADM

## 2023-01-28 RX ORDER — SODIUM CHLORIDE 0.9 % (FLUSH) 0.9 %
5-40 SYRINGE (ML) INJECTION PRN
Status: DISCONTINUED | OUTPATIENT
Start: 2023-01-28 | End: 2023-01-31 | Stop reason: HOSPADM

## 2023-01-28 RX ORDER — 0.9 % SODIUM CHLORIDE 0.9 %
1000 INTRAVENOUS SOLUTION INTRAVENOUS ONCE
Status: COMPLETED | OUTPATIENT
Start: 2023-01-28 | End: 2023-01-28

## 2023-01-28 RX ORDER — ACETAMINOPHEN 325 MG/1
650 TABLET ORAL EVERY 6 HOURS PRN
Status: DISCONTINUED | OUTPATIENT
Start: 2023-01-28 | End: 2023-01-31 | Stop reason: HOSPADM

## 2023-01-28 RX ORDER — CLONIDINE HYDROCHLORIDE 0.2 MG/1
0.2 TABLET ORAL NIGHTLY PRN
COMMUNITY

## 2023-01-28 RX ORDER — SODIUM CHLORIDE 9 MG/ML
INJECTION, SOLUTION INTRAVENOUS PRN
Status: DISCONTINUED | OUTPATIENT
Start: 2023-01-28 | End: 2023-01-31 | Stop reason: HOSPADM

## 2023-01-28 RX ORDER — ENOXAPARIN SODIUM 100 MG/ML
40 INJECTION SUBCUTANEOUS DAILY
Status: DISCONTINUED | OUTPATIENT
Start: 2023-01-28 | End: 2023-01-31 | Stop reason: HOSPADM

## 2023-01-28 RX ORDER — PANTOPRAZOLE SODIUM 40 MG/10ML
40 INJECTION, POWDER, LYOPHILIZED, FOR SOLUTION INTRAVENOUS DAILY
Status: DISCONTINUED | OUTPATIENT
Start: 2023-01-28 | End: 2023-01-31 | Stop reason: HOSPADM

## 2023-01-28 RX ORDER — ONDANSETRON 4 MG/1
4 TABLET, ORALLY DISINTEGRATING ORAL EVERY 8 HOURS PRN
Status: DISCONTINUED | OUTPATIENT
Start: 2023-01-28 | End: 2023-01-31 | Stop reason: HOSPADM

## 2023-01-28 RX ORDER — ONDANSETRON 2 MG/ML
4 INJECTION INTRAMUSCULAR; INTRAVENOUS ONCE
Status: COMPLETED | OUTPATIENT
Start: 2023-01-28 | End: 2023-01-28

## 2023-01-28 RX ORDER — 0.9 % SODIUM CHLORIDE 0.9 %
500 INTRAVENOUS SOLUTION INTRAVENOUS ONCE
Status: COMPLETED | OUTPATIENT
Start: 2023-01-28 | End: 2023-01-28

## 2023-01-28 RX ADMIN — ONDANSETRON 4 MG: 2 INJECTION INTRAMUSCULAR; INTRAVENOUS at 23:47

## 2023-01-28 RX ADMIN — SODIUM CHLORIDE 1000 ML: 9 INJECTION, SOLUTION INTRAVENOUS at 03:53

## 2023-01-28 RX ADMIN — METOCLOPRAMIDE HYDROCHLORIDE 10 MG: 5 INJECTION INTRAMUSCULAR; INTRAVENOUS at 03:52

## 2023-01-28 RX ADMIN — MORPHINE SULFATE 4 MG: 4 INJECTION, SOLUTION INTRAMUSCULAR; INTRAVENOUS at 03:48

## 2023-01-28 RX ADMIN — MORPHINE SULFATE 4 MG: 4 INJECTION, SOLUTION INTRAMUSCULAR; INTRAVENOUS at 12:31

## 2023-01-28 RX ADMIN — ONDANSETRON 4 MG: 2 INJECTION INTRAMUSCULAR; INTRAVENOUS at 17:39

## 2023-01-28 RX ADMIN — SODIUM CHLORIDE 1000 ML: 9 INJECTION, SOLUTION INTRAVENOUS at 04:46

## 2023-01-28 RX ADMIN — PROMETHAZINE HYDROCHLORIDE 25 MG: 25 INJECTION INTRAMUSCULAR; INTRAVENOUS at 04:47

## 2023-01-28 RX ADMIN — DIPHENHYDRAMINE HYDROCHLORIDE 12.5 MG: 50 INJECTION, SOLUTION INTRAMUSCULAR; INTRAVENOUS at 12:31

## 2023-01-28 RX ADMIN — SODIUM CHLORIDE: 9 INJECTION, SOLUTION INTRAVENOUS at 17:07

## 2023-01-28 RX ADMIN — MORPHINE SULFATE 4 MG: 4 INJECTION, SOLUTION INTRAMUSCULAR; INTRAVENOUS at 04:45

## 2023-01-28 RX ADMIN — PANTOPRAZOLE SODIUM 40 MG: 40 INJECTION, POWDER, FOR SOLUTION INTRAVENOUS at 16:08

## 2023-01-28 RX ADMIN — SODIUM CHLORIDE 500 ML: 9 INJECTION, SOLUTION INTRAVENOUS at 12:55

## 2023-01-28 RX ADMIN — ONDANSETRON 4 MG: 2 INJECTION INTRAMUSCULAR; INTRAVENOUS at 03:48

## 2023-01-28 RX ADMIN — METOCLOPRAMIDE HYDROCHLORIDE 10 MG: 5 INJECTION INTRAMUSCULAR; INTRAVENOUS at 12:31

## 2023-01-28 RX ADMIN — IOPAMIDOL 75 ML: 755 INJECTION, SOLUTION INTRAVENOUS at 13:12

## 2023-01-28 ASSESSMENT — PAIN DESCRIPTION - ORIENTATION: ORIENTATION: LOWER

## 2023-01-28 ASSESSMENT — PAIN SCALES - GENERAL
PAINLEVEL_OUTOF10: 8
PAINLEVEL_OUTOF10: 8
PAINLEVEL_OUTOF10: 3
PAINLEVEL_OUTOF10: 8
PAINLEVEL_OUTOF10: 7
PAINLEVEL_OUTOF10: 8
PAINLEVEL_OUTOF10: 2
PAINLEVEL_OUTOF10: 8

## 2023-01-28 ASSESSMENT — ENCOUNTER SYMPTOMS
ABDOMINAL PAIN: 1
RECTAL PAIN: 0
SHORTNESS OF BREATH: 0
SORE THROAT: 0
COUGH: 0
BLOOD IN STOOL: 0
EYE PAIN: 0
CHEST TIGHTNESS: 0
CONSTIPATION: 0
BACK PAIN: 0
VOMITING: 1
DIARRHEA: 0
PHOTOPHOBIA: 0
RHINORRHEA: 0
NAUSEA: 1

## 2023-01-28 ASSESSMENT — PAIN - FUNCTIONAL ASSESSMENT
PAIN_FUNCTIONAL_ASSESSMENT: 0-10
PAIN_FUNCTIONAL_ASSESSMENT: 0-10

## 2023-01-28 ASSESSMENT — PAIN DESCRIPTION - DESCRIPTORS: DESCRIPTORS: CRAMPING

## 2023-01-28 ASSESSMENT — PAIN DESCRIPTION - LOCATION
LOCATION: ABDOMEN

## 2023-01-28 NOTE — ED PROVIDER NOTES
I independently performed a history and physical on [facility]. All diagnostic, treatment, and disposition decisions were made by myself in conjunction with the advanced practice provider. I personally saw the patient and performed a substantive portion of the visit including all aspects of the medical decision making. For further details of [facility] emergency department encounter, please see Art El NP's documentation. Patient is a 67-year-old female presenting today due to concern for returning to the emergency department with intractable vomiting again after initially being seen earlier this morning and discharged after she did have some improvement for about an hour but then has vomited multiple times over the last couple of hours to the point where she feels very dehydrated and weak again. She denies any chest pain. No headache or sore throat. She does complain of lower abdominal pain now but denies any diarrhea. Due to concern for worsening abdominal pain and vomiting, she came back to the ED with her mother for further assessment. Physical:   Gen: Mild acute distress. AOx3. Psych: Anxious mood and affect  HEENT: NCAT, dry MM  Neck: supple  Cardiac: RRR, pulses 2+ in upper extremities  Lungs: C2AB, no R/R/W  Abdomen: soft and mild suprapubic tenderness with no R/D/G  Neuro: no focal neuro deficits with strength 5/5 in all 4 extremities, GCS equals 15        The Ekg interpreted by me shows  Sinus bradycardia with sinus arrhythmia noted  with a rate of 58  Axis is   Normal  QTc is  normal  Intervals and Durations are unremarkable. ST Segments: no acute change and nonspecific changes  No significant change from prior EKG dated - 11/20/22  No STEMI           I am the primary provider for the patient along with SMITH Delatorre.       MDM: Patient was evaluated due to a return of significant amount of vomiting after being discharged from the emergency department earlier this morning and now having new abdominal pain. I am considering appendicitis, DKA, bowel obstruction, cannabis hyperemesis syndrome, gastritis, amongst other pathology. She denies any chest pain and story not suggestive of acute coronary syndrome. Initial lactic acid was elevated on arrival but it did improve with IV fluids. She will need further assessment in the hospital with possible GI consultation. She is stable for the floor with telemetry and agrees with this plan.         Annabella Feldman MD  01/28/23 [id number]

## 2023-01-28 NOTE — ED PROVIDER NOTES
[facility]  ED  [address]        Patient Name: Lizandro Choi  MRN: [id number]  [place] 2000  Date of evaluation: 1/28/2023  Provider: Vianey Cali MD  PCP: Frtiz Chavez MD  Note Started: 5:28 AM EST 1/28/23    CHIEF COMPLAINT       Abdominal Pain (Started at 0681 [id number] 12 72. BLQ. Cramping. Hx DM 1. Took Zofran w/o relief), Nausea, and Emesis      HISTORY OF PRESENT ILLNESS: 1 or more Elements     History from : Patient    Limitations to history : None    Lizandro Choi is a 25 y.o. female who presents abd pain and vomiting    - diabetic on insulin pump p/w acute diffuse abd pain and nausea/vomiting since 10 pm  - unable to tolerate PO, continues to have generalized abd pain  - has had prior multiple hospialization/ED visits for similar presenation  - reports to have taken insulin regularly and has had regular diet, nothing unusually or skipped a dose of medication  - denies f/c/cp/sob/rash or recent sick contact  - has insulin pump, pt states that she took insulin bolus (unclear dose)    Nursing Notes were all reviewed and agreed with or any disagreements were addressed in the HPI. REVIEW OF SYSTEMS :      Positives and Pertinent negatives as per HPI. SURGICAL HISTORY     Past Surgical History:   Procedure Laterality Date    TONSILLECTOMY         CURRENTMEDICATIONS       Previous Medications    AMITRIPTYLINE (ELAVIL) 50 MG TABLET        CLONIDINE (CATAPRES) 0.2 MG TABLET    Take 0.2 mg by mouth nightly as needed    HUMALOG 100 UNIT/ML INJECTION VIAL    Insulin pump w/ continuous monitor    INSULIN GLARGINE (LANTUS) 100 UNIT/ML INJECTION    Inject  into the skin nightly. METOCLOPRAMIDE (REGLAN) 10 MG TABLET    Take 1 tablet by mouth 3 times daily as needed (nausea/vomiting)    SPRINTEC 28 0.25-35 MG-MCG PER TABLET           ALLERGIES     Patient has no known allergies. FAMILYHISTORY     History reviewed. No pertinent family history.      SOCIAL HISTORY Social History     Tobacco Use    Smoking status: Never    Smokeless tobacco: Never   Vaping Use    Vaping Use: Never used   Substance Use Topics    Alcohol use: Yes     Comment: rarely    Drug use: Yes     Types: Marijuana (Weed)       SCREENINGS        Will Coma Scale  Eye Opening: Spontaneous  Best Verbal Response: Oriented  Best Motor Response: Obeys commands  Will Coma Scale Score: 15                CIWA Assessment  BP: (!) 141/88  Heart Rate: 52           PHYSICAL EXAM  1 or more Elements     ED Triage Vitals [01/28/23 0325]   BP Temp Temp Source Heart Rate Resp SpO2 Height Weight   (!) 149/86 97.6 °F (36.4 °C) Oral 60 16 99 % 5' 2\" (1.575 m) 140 lb (63.5 kg)       General: No acute distress. Alert and Oriented. Appears stated age. HEENT: No midline cervical spine tenderness. Full ROM of the neck. There is no significant cervical lympadenopathy. No difficulty tolerating oral secretions. Cardiac: Regular rate and rhythm. Radial pulses are intact bilaterally. Chest: No respiratory distress. Clear breath sounds bilaterally. No increased work of breathing. No use of accessory muscles for respiration. Abdomen: Soft, tender to palp all quadrants, no rebound, no guarding  Extremities:No significant lower extremity edema. Lower extremities are symmetric. Neuro: Moving all extremities. No focal deficits. Speech is clear. Skin:No rash, no erythema  Psych: Calm and cooperative.       DIAGNOSTIC RESULTS   LABS:    Labs Reviewed   CBC WITH AUTO DIFFERENTIAL - Abnormal; Notable for the following components:       Result Value    WBC 18.8 (*)     Neutrophils Absolute 17.4 (*)     Lymphocytes Absolute 0.9 (*)     All other components within normal limits   COMPREHENSIVE METABOLIC PANEL W/ REFLEX TO MG FOR LOW K - Abnormal; Notable for the following components:    Chloride 97 (*)     CO2 18 (*)     Anion Gap 22 (*)     Glucose 221 (*)     Albumin 5.1 (*)     All other components within normal limits   BLOOD GAS, VENOUS - Abnormal; Notable for the following components:    pCO2, Daniel 31.7 (*)     pO2, Daniel 45.1 (*)     HCO3, Venous 18.3 (*)     Base Excess, Daniel -5.6 (*)     All other components within normal limits   URINALYSIS WITH REFLEX TO CULTURE - Abnormal; Notable for the following components:    Glucose, Ur 250 (*)     Ketones, Urine >=80 (*)     Protein, UA TRACE (*)     All other components within normal limits   MICROSCOPIC URINALYSIS - Abnormal; Notable for the following components:    Bacteria, UA 1+ (*)     All other components within normal limits   POCT GLUCOSE - Abnormal; Notable for the following components:    POC Glucose 196 (*)     All other components within normal limits   POCT GLUCOSE - Normal   PHOSPHORUS   BETA-HYDROXYBUTYRATE   POCT GLUCOSE       When ordered only abnormal lab results are displayed. All other labs were within normal range or not returned as of this dictation. EKG    - not indicated      RADIOLOGY:   Non-plain film images such as CT, Ultrasound and MRI are read by the radiologist. Plain radiographic images are visualized and preliminarily interpreted by the ED Provider with the below findings:    - not indicated    Interpretation per the Radiologist below, if available at the time of this note:    No orders to display     No results found. Bedside Ultrasound, as interpreted by me, if performed:    No results found. PROCEDURES     Unless otherwise noted below, none     Procedures    CRITICAL CARE TIME     I personally spent a total of 60 minutes of critical care time in obtaining history, performing a physical exam, bedside monitoring of interventions, collecting and interpreting tests and discussion with consultants but excluding time spent performing procedures, treating other patients and teaching time.                                                                                                            PAST MEDICAL HISTORY      has a past medical history of ADHD and Diabetes mellitus ([facility] [address] EMERGENCY DEPARTMENT COURSE and DIFFERENTIAL DIAGNOSIS/MDM:     Vitals:    Vitals:    01/28/23 0325 01/28/23 0330 01/28/23 0400   BP: (!) 149/86 (!) 149/86 (!) 141/88   Pulse: 60 67 52   Resp: 16 14 19   Temp: 97.6 °F (36.4 °C)     TempSrc: Oral     SpO2: 99% 100% 94%   Weight: 140 lb (63.5 kg)     Height: 5' 2\" (1.575 m)         Patient was treated with and given the following medications:  Medications   0.9 % sodium chloride bolus (1,000 mLs IntraVENous New Bag 1/28/23 0446)   0.9 % sodium chloride bolus (0 mLs IntraVENous Stopped 1/28/23 0445)   ondansetron (ZOFRAN) injection 4 mg (4 mg IntraVENous Given 1/28/23 0348)   morphine sulfate (PF) injection 4 mg (4 mg IntraVENous Given 1/28/23 0348)   metoclopramide (REGLAN) injection 10 mg (10 mg IntraVENous Given 1/28/23 0352)   promethazine (PHENERGAN) injection 25 mg (25 mg IntraMUSCular Given 1/28/23 0447)   morphine sulfate (PF) injection 4 mg (4 mg IntraVENous Given 1/28/23 0445)             Is this patient to be included in the SEP-1 Core Measure due to severe sepsis or septic shock?    No Exclusion criteria - the patient is NOT to be included for SEP-1 Core Measure due to: 2+ SIRS criteria are not met    CC/HPI Summary, DDx, ED Course, and Reassessment:     - ddx: DKA, gastroenteritis, unlikely sbo  - WBC 18.8 but continues to be afebrile  - glucose 196, anion gap 22, nml pH  - adminstered IV NS bolus, IV zofran and reglan for n/v, IV morphine for pain  - s/p insulin bolus (per pt report), repeat anion gap 13, glucose 151  - pt's vomiting resolved, abd pain improved  - pt and her wife agreed to discharged to home with PCP and endocrinologist f/u    CONSULTS: (Who and What was discussed)  None    Discussion with Other Professionals : None    Social Determinants : None    Patient's care impacted by chronic condition(s): diabetes    Records Reviewed : Inpatient Notes   and Outpatient Notes      I considered CTAP but did not after shared decision making with patient due to low suspicious of acute abdominal processes, most likely 2/2 diabetic gastroparesis. Appropriate for outpatient management PCP and Endocrinologist f/u    I am the Primary Clinician of Record. FINAL IMPRESSION    No diagnosis found. DISPOSITION/PLAN     DISPOSITION        PATIENT REFERRED TO:  No follow-up provider specified. DISCHARGE MEDICATIONS:  Patient was given scripts for the following medications. I counseled patient how to take these medications:  New Prescriptions    No medications on file       DISCONTINUED MEDICATIONS:  Discontinued Medications    No medications on file              (This chart was generated in part by using Dragon Dictation system and may contain errors related to that system including errors in grammar, punctuation, and spelling, as well as words and phrases that may be inappropriate.  If there are any questions or concerns please feel free to contact the dictating provider for clarification.)    Huy Wells MD, PhD  Family Medicine, PGY-1       Huy Wells MD PhD  Resident  01/28/23 9472

## 2023-01-28 NOTE — H&P
Hospital Medicine History & Physical      PCP: Charlie Jauregui MD    Date of Admission: 1/28/2023    Date of Service: Pt seen/examined on 1/28/2023 and Admitted to Inpatient with expected LOS greater than two midnights due to medical therapy. Chief Complaint: Intractable nausea and vomiting x 2 days      History Of Present Illness:      25 y.o. female who presented to [facility] with above complaint. She told she had 5 or 6 admission for above complaint since last September. He has been Following with GI as an outpatient and she had a EGD which was normal.  She is waiting for gastric emptying study. After getting antiemetics and IV fluids she feels much better. Currently she does not have any nausea or vomiting. She thinks she can take some liquid diet at this time. She denies abdominal pain abdominal distention fever change in mental status diarrhea or any urinary complaints. There was a concern about his episodes living with menstruation. Currently she is on hormones to  cut down the frequency   menstruation t. Currently she gets once in 3 months. She is a college student, smokes marijuana for anxiety. She smokes cigarettes occasionally. She is sexually active      Past Medical History:          Diagnosis Date    ADHD     Diabetes mellitus ([facility] [address]        Past Surgical History:          Procedure Laterality Date    TONSILLECTOMY         Medications Prior to Admission:      Prior to Admission medications    Medication Sig Start Date End Date Taking?  Authorizing Provider   cloNIDine (CATAPRES) 0.2 MG tablet Take 0.2 mg by mouth nightly as needed    Historical Provider, MD   metoclopramide (REGLAN) 10 MG tablet Take 1 tablet by mouth 3 times daily as needed (nausea/vomiting) 11/22/22 12/6/22  Terra Reyes MD   HUMALOG 100 UNIT/ML injection vial Insulin pump w/ continuous monitor 11/24/20   Historical Provider, MD   amitriptyline (ELAVIL) 50 MG tablet  11/2/20   Historical Provider, MD [address] [id number] 0.25-35 MG-MCG per tablet  12/7/20   Historical Provider, MD   insulin glargine (LANTUS) 100 UNIT/ML injection Inject  into the skin nightly. Patient not taking: Reported on 1/28/2023    Historical Provider, MD       Allergies:  Patient has no known allergies. Social History:      The patient currently lives at [address] [place] [address],[address]:   reports that she has never smoked. She has never used smokeless tobacco.  ETOH:   reports current alcohol use. E-cigarette/Vaping       Questions Responses    E-cigarette/Vaping Use Never User    Start Date     Passive Exposure     Quit Date     Counseling Given     Comments               Family History:      Reviewed and negative in regards to presenting illness/complaint. History reviewed. No pertinent family history. REVIEW OF SYSTEMS COMPLETED:   Pertinent positives as noted in the HPI. All other systems reviewed and negative. PHYSICAL EXAM PERFORMED:    /67   Pulse 77   Temp 97.4 °F (36.3 °C) (Oral)   Resp 16   Ht 5' 2\" (1.575 m)   Wt 140 lb (63.5 kg)   LMP 01/21/2023 (Exact Date)   SpO2 98%   BMI 25.61 kg/m²     General appearance:  No apparent distress, appears stated age and cooperative. HEENT:  Normal cephalic, atraumatic without obvious deformity. Pupils equal, round, and reactive to light. Extra ocular muscles intact. Conjunctivae/corneas clear. Neck: Supple, with full range of motion. No jugular venous distention. Trachea midline. Respiratory:  Normal respiratory effort. Clear to auscultation, bilaterally without Rales/Wheezes/Rhonchi. Cardiovascular:  Regular rate and rhythm with normal S1/S2 without murmurs, rubs or gallops. Abdomen: Soft, non-tender, non-distended with normal bowel sounds. Musculoskeletal:  No clubbing, cyanosis or edema bilaterally. Full range of motion without deformity. Skin: Skin color, texture, turgor normal.  No rashes or lesions.   Neurologic:  Neurovascularly intact without any focal sensory/motor deficits. Cranial nerves: II-XII intact, grossly non-focal.  Psychiatric:  Alert and oriented, thought content appropriate, normal insight  Capillary Refill: Brisk,3 seconds, normal  Peripheral Pulses: +2 palpable, equal bilaterally       Labs:     Recent Labs     01/28/23  0334 01/28/23  1220   WBC 18.8* 14.1*   HGB 14.6 12.9   HCT 44.0 39.4    376     Recent Labs     01/28/23  0334 01/28/23  0531 01/28/23  1220    143 134*   K 4.2 4.7 4.4   CL 97* 112* 98*   CO2 18* 18* 16*   BUN 13 11 9   CREATININE 0.7 <0.5* 0.6   CALCIUM 10.1 7.4* 9.3   PHOS 3.5  --   --      Recent Labs     01/28/23  0334 01/28/23  1220   AST 17 17   ALT 13 12   BILITOT 0.6 0.4   ALKPHOS 71 65     No results for input(s): INR in the last 72 hours. Recent Labs     01/28/23  1220   TROPONINI <0.01       Urinalysis:      Lab Results   Component Value Date/Time    NITRU Negative 01/28/2023 12:23 PM    [address] 0-2 01/28/2023 12:23 PM    BACTERIA 1+ 01/28/2023 04:25 AM    RBCUA 0-2 01/28/2023 12:23 PM    BLOODU TRACE-INTACT 01/28/2023 12:23 PM    SPECGRAV >=1.030 01/28/2023 12:23 PM    GLUCOSEU 500 01/28/2023 12:23 PM       Radiology:     CXR: I have reviewed the CXR with the following interpretation:   EKG:  I have reviewed the EKG with the following interpretation:     CT ABDOMEN PELVIS W IV CONTRAST Additional Contrast? None   Final Result   No acute intra-abdominal or pelvic abnormality             Consults:    None    ASSESSMENT:    Active Hospital Problems    Diagnosis Date Noted    Cyclical vomiting, intractable [R11.15] 01/28/2023     Priority: Medium         PLAN:  Intractable cyclical vomiting-not quite sure about the cause?   Marijuana induced, awaiting for gastric emptying study-admit, IV fluids, diet as tolerated, antiemetics, IV Protonix consider GI if necessary  Pregnancy test negative    Mild leukocytosis-possibly secondary to above, monitor    Anion gap acidosis-no evidence of uremia, possibly secondary dehydration, lactic acid was elevated and improving and beta hydroxybutyrate if anion gap widens  No evidence of infection, does not look like DKA  Blood sugar is 90 and patient improved clinically  Monitor BMP with IV fluids      Diabetes type 1-has insulin pump and which daily insulin according to blood sugar automatically-set in such a way-monitor blood sugar    Smoker- counseled    Marijuana use-counseled    Opioids in urine tox? -Patient  received morphine in the ER this am  DVT Prophylaxis: Lovenox  Diet: Clear liquid diet  Code Status: Full code    PT/OT Eval Status:     Dispo -admitted to Kathy Sahu MD    Thank you John Tapia MD for the opportunity to be involved in this patient's care. If you have any questions or concerns please feel free to contact me at [phone].

## 2023-01-28 NOTE — ED PROVIDER NOTES
[facility]  ED  EMERGENCY DEPARTMENT ENCOUNTER        Pt Name: Briana Barbour  MRN: [id number]  [place][birth year][place] 2000  Date of evaluation: 1/28/2023  Provider: FIDENCIO Love - CNP  PCP: Shelbi Pablo MD    This patient was seen and evaluated by the attending physician No att. providers found. I have evaluated this patient. My supervising physician was available for consultation. CHIEF COMPLAINT       Chief Complaint   Patient presents with    Abdominal Pain     Pt to ED with c/o lower abd pain and N/V since about 0800 this morning. Pt reports she was discharged around 0700 this morning and felt fine then started to get nauseous an hour later. Pt reports she has thrown up too many times to count. Hx diabetes. Was here this morning for same symptoms        HISTORY OF PRESENT ILLNESS   (Location/Symptom, Timing/Onset, Context/Setting, Quality, Duration, Modifying Factors, Severity)  Note limiting factors. Briana Barbour is a 25 y.o. female who presents via private car for  nausea, vomiting, abdominal pain. Onset was yesterday evening. Duration has been worsening since the onset. Context includes patient presents to the emergency department today after being discharged from the ER at approximately 7 AM.  She states that yesterday evening she began with nausea vomiting and lower abdominal pain that has worsened today. She had antinausea medications at home and states that she took Zofran last at approximately 8 AM and has not been able to tolerate any p.o since taking this medication. She states the pain in her lower abdomen has increased. She denies chest pain, palpitations or swelling her extremities. She denies shortness of breath, cough congestion or fevers. She does endorse lower abdominal pain and has not had any surgeries on her abdomen. She endorses nausea and vomiting that is nonbilious and nonbloody. She denies diarrhea or constipation.   She states her last bowel movement was yesterday and was nonbloody and formed. She denies urinary symptoms including dysuria, urgency, frequency, hematuria or flank pain. She does endorse a history of frequent marijuana usage and states she last smoked marijuana in the beginning of the week. Quality is dull and aching with cramping with radiation to her abdomen. Alleviating factors include nothing. Aggravating factors include nothing. Pain is 8/10. Morphine has been used for pain today. Chart review reveals pt has significant PMHx of intractable nausea and vomiting, ADHD, diabetes mellitus. They take amitriptyline, clonidine, humalog, insulin, metoclopramide, sprintec. Nursing Notes were all reviewed and agreed with or any disagreements were addressed  in the HPI. Pt was seen during the [place] 19 pandemic. Appropriate PPE worn by ME during patient encounters. Pt seen during a time with constrained hospital bed capacity and other potential inpatient and outpatient resources were constrained due to the viral pandemic. REVIEW OF SYSTEMS    (2-9 systems for level 4, 10 or more for level 5)     Review of Systems   Constitutional:  Negative for chills, diaphoresis and fever. HENT:  Negative for congestion, rhinorrhea and sore throat. Eyes:  Negative for photophobia, pain and visual disturbance. Respiratory:  Negative for cough, chest tightness and shortness of breath. Cardiovascular:  Negative for chest pain, palpitations and leg swelling. Gastrointestinal:  Positive for abdominal pain, nausea and vomiting. Negative for blood in stool, constipation, diarrhea and rectal pain. Genitourinary:  Negative for dysuria, flank pain, frequency, hematuria and urgency. Musculoskeletal:  Negative for back pain, neck pain and neck stiffness. Skin:  Negative for rash and wound. Neurological:  Negative for dizziness, light-headedness and headaches. Positives and Pertinent negatives as per HPI.   Except as noted abovein the ROS, all other systems were reviewed and negative. PAST MEDICAL HISTORY     Past Medical History:   Diagnosis Date    ADHD     Diabetes mellitus ([facility] [address]          SURGICAL HISTORY     Past Surgical History:   Procedure Laterality Date    TONSILLECTOMY           CURRENTMEDICATIONS       Previous Medications    AMITRIPTYLINE (ELAVIL) 50 MG TABLET        CLONIDINE (CATAPRES) 0.2 MG TABLET    Take 0.2 mg by mouth nightly as needed    HUMALOG 100 UNIT/ML INJECTION VIAL    Insulin pump w/ continuous monitor    INSULIN GLARGINE (LANTUS) 100 UNIT/ML INJECTION    Inject  into the skin nightly. METOCLOPRAMIDE (REGLAN) 10 MG TABLET    Take 1 tablet by mouth 3 times daily as needed (nausea/vomiting)    SPRINTEC 28 0.25-35 MG-MCG PER TABLET             ALLERGIES     Patient has no known allergies. FAMILYHISTORY     History reviewed. No pertinent family history. SOCIAL HISTORY       Social History     Socioeconomic History    Marital status: Single     Spouse name: None    Number of children: None    Years of education: None    Highest education level: None   Tobacco Use    Smoking status: Never    Smokeless tobacco: Never   Vaping Use    Vaping Use: Never used   Substance and Sexual Activity    Alcohol use: Yes     Comment: rarely    Drug use: Yes     Types: Marijuana (Weed)       SCREENINGS    Will Coma Scale  Eye Opening: Spontaneous  Best Verbal Response: Oriented  Best Motor Response: Obeys commands  [place] Coma Scale Score: 15        PHYSICAL EXAM    (up to 7 for level 4, 8 or more for level 5)     ED Triage Vitals   BP Temp Temp src Pulse Resp SpO2 Height Weight   -- -- -- -- -- -- -- --       Physical Exam  Vitals and nursing note reviewed. Constitutional:       Appearance: Normal appearance. She is not ill-appearing or diaphoretic. HENT:      Head: Normocephalic and atraumatic.       Right Ear: External ear normal.      Left Ear: External ear normal.      Mouth/Throat:      Mouth: Mucous membranes are dry. Pharynx: Oropharynx is clear. Eyes:      General:         Right eye: No discharge. Left eye: No discharge. Cardiovascular:      Rate and Rhythm: Normal rate and regular rhythm. Pulses: Normal pulses. Radial pulses are 2+ on the right side and 2+ on the left side. Heart sounds: Normal heart sounds. No murmur heard. No friction rub. No gallop. Pulmonary:      Effort: Pulmonary effort is normal. No respiratory distress. Breath sounds: Normal breath sounds. No stridor. No wheezing, rhonchi or rales. Chest:      Chest wall: No tenderness. Abdominal:      General: Abdomen is flat. Bowel sounds are normal.      Palpations: Abdomen is soft. Tenderness: There is abdominal tenderness in the right lower quadrant and left lower quadrant. There is no right CVA tenderness, left CVA tenderness, guarding or rebound. Negative signs include Campos's sign, Rovsing's sign, psoas sign and obturator sign. Hernia: No hernia is present. Musculoskeletal:         General: Normal range of motion. Cervical back: Normal range of motion and neck supple. No rigidity or tenderness. Skin:     General: Skin is warm and dry. Neurological:      General: No focal deficit present. Mental Status: She is alert and oriented to person, place, and time. GCS: GCS eye subscore is 4. GCS verbal subscore is 5. GCS motor subscore is 6.    Psychiatric:         Mood and Affect: Mood normal.         Behavior: Behavior normal.     PHYSICAL EXAM  BP (!) 121/49   Pulse 84   Temp 97.4 °F (36.3 °C) (Oral)   Resp 16   Ht 5' 2\" (1.575 m)   Wt 140 lb (63.5 kg)   LMP 01/21/2023 (Exact Date)   SpO2 97%   BMI 25.61 kg/m²       DIAGNOSTIC RESULTS   LABS:    Labs Reviewed   CBC WITH AUTO DIFFERENTIAL - Abnormal; Notable for the following components:       Result Value    WBC 14.1 (*)     Neutrophils Absolute 12.8 (*)     Lymphocytes Absolute 0.9 (*)     All other components within normal limits   COMPREHENSIVE METABOLIC PANEL W/ REFLEX TO MG FOR LOW K - Abnormal; Notable for the following components:    Sodium 134 (*)     Chloride 98 (*)     CO2 16 (*)     Anion Gap 20 (*)     Glucose 208 (*)     All other components within normal limits   LACTATE, SEPSIS - Abnormal; Notable for the following components:    Lactic Acid, Sepsis 2.1 (*)     All other components within normal limits   LIPASE - Abnormal; Notable for the following components:    Lipase 6.0 (*)     All other components within normal limits   URINALYSIS WITH REFLEX TO CULTURE - Abnormal; Notable for the following components:    Glucose, Ur 500 (*)     Ketones, Urine >=80 (*)     Blood, Urine TRACE-INTACT (*)     Protein,  (*)     All other components within normal limits   URINE DRUG SCREEN - Abnormal; Notable for the following components:    Cannabinoid Scrn, Ur POSITIVE (*)     Opiate Scrn, Ur POSITIVE (*)     All other components within normal limits   BLOOD GAS, VENOUS - Abnormal; Notable for the following components:    pCO2, Daniel 28.7 (*)     pO2, Daniel 147.3 (*)     HCO3, Venous 16.3 (*)     Base Excess, Daniel -7.5 (*)     All other components within normal limits   POCT GLUCOSE - Abnormal; Notable for the following components:    POC Glucose 200 (*)     All other components within normal limits   LACTATE, SEPSIS   HCG, SERUM, QUALITATIVE   MICROSCOPIC URINALYSIS   TROPONIN   BETA-HYDROXYBUTYRATE       All other labs were within normal range or not returned as of this dictation. EKG: All EKG's are interpreted by the Emergency Department Physician who either signs orCo-signs this chart in the absence of a cardiologist.  Please see their note for interpretation of EKG.       RADIOLOGY:   Non-plain film images such as CT, Ultrasound and MRI are read by the radiologist. Plain radiographic images are visualized andpreliminarily interpreted by the  ED Provider with the below findings:        Interpretation jose l Radiologist below, if available at the time of this note:    CT ABDOMEN PELVIS W IV CONTRAST Additional Contrast? None   Final Result   No acute intra-abdominal or pelvic abnormality           No results found. PROCEDURES   Unless otherwise noted below, none     Procedures    CRITICAL CARE TIME   N/A    CONSULTS:  None      EMERGENCY DEPARTMENT COURSE and DIFFERENTIALDIAGNOSIS/MDM:   Vitals:    Vitals:    01/28/23 1205 01/28/23 1231 01/28/23 1300 01/28/23 1345   BP: (!) 175/80 (!) 180/97 (!) 154/110 (!) 121/49   Pulse: 78 70 81 84   Resp: 16 17 20 16   Temp: 97.4 °F (36.3 °C)      TempSrc: Oral      SpO2: 99% 99% 97% 97%   Weight: 140 lb (63.5 kg)      Height: 5' 2\" (1.575 m)          Patient was given thefollowing medications:  Medications   morphine sulfate (PF) injection 4 mg (4 mg IntraVENous Given 1/28/23 1231)   metoclopramide (REGLAN) injection 10 mg (10 mg IntraVENous Given 1/28/23 1231)   diphenhydrAMINE (BENADRYL) injection 12.5 mg (12.5 mg IntraVENous Given 1/28/23 1231)   0.9 % sodium chloride bolus (0 mLs IntraVENous Stopped 1/28/23 1340)   iopamidol (ISOVUE-370) 76 % injection 75 mL (75 mLs IntraVENous Given 1/28/23 1312)       PDMP Monitoring:    Last PDMP Anupam as Reviewed [facility]):  Review User Review Instant Review Result            Urine Drug Screenings (1 yr)    No resulted procedures found. Medication Contract and Consent for Opioid Use Documents Filed        No documents found                    MDM:   This patient was seen and evaluated by myself and my attending physician. She presents to the emergency department today for evaluation of nausea with vomiting and lower abdominal pain. On exam she is alert and oriented, hemodynamically stable nontoxic in appearance.   She is obviously nauseated with retching on initial exam.  She will have a work-up in the emergency department consisting of laboratory studies and imaging and treat medicated with morphine for pain as well as Reglan and Benadryl IV for nausea and given a 1 L normal saline IV bolus. EKG will also be obtained as she has had multiple antinausea medications to evaluate for prolonged QT. Relevant medical records were evaluated and reviewed from her ER visit this morning. Laboratory studies and images were evaluated and reviewed with the patient. Urinalysis reveals glucose of 500, greater than 80 ketones, trace blood and protein but is otherwise negative for nitrites or leukocytes. Urine drug screen was positive for cannabinoids as well as opiates, patient did receive morphine during her ER visit this morning. Microscopic urinalysis 0-2 WBCs, 0-2 RBCs and 0-1 epithelial cells. Not concerning for infection. CBC does reveal a leukocytosis of 14.1 which is downtrending from her level of 18.8 this morning at approximately 3:30 AM.  CMP does reveal a mild hyponatremia of 134, chloride of 98, CO2 of 16, anion gap of 20 and glucose of 208. Lactic is elevated at 2.1. Venous blood gas reveals a PCO2 of 28.7, PO2 of 147.3, bicarb of 16.3 and base excess of -7.5. VBG and CMP reveal a primary metabolic compendsated acidosis with secondary respiratory alkalosis. Beta hydroxybutyrate pending at this time, repeat lactic after IV fluids downtrending to 1.2  hCG is negative. Troponin less than 0.01. Point-of-care glucose was 200. EKG was evaluated by myself and my attending physician, did not reveal a prolonged QT, safe to receive antiemetics at this time. CT abdomen and pelvis with IV contrast interpreted by the radiologist for no acute intra-abdominal or pelvic abnormality. Low attenuation in the liver adjacent to the falciform ligament is most consistent with focal fatty infiltration as seen on prior ultrasound. Otherwise solid organs of the abdomen are unremarkable with no evidence of urinary tract obstruction the gallbladder is unremarkable.   No evidence of bowel wall thickening, inflammation or free fluid there is no bowel obstruction. Urinary bladder is unremarkable. Abdominal aorta and iliac arteries are normal in caliber with no pathologic adenopathy. No bony abnormalities. On reevaluation she was feeling slight improvement however had not attempted a po challenge. I did discuss with her that these symptoms while not entirely caused by but could be exacerbated by her marijuana usage and I counseled her on cessation. She does not appear in DKA at this time, glucose is managed and she is not acidotic. I discussed with her a plan for disposition of admission for iv fluids and anti emetics and she was agreeable to this plan. Consult placed to hospital medicine who accepted care of the patient. Care of the patient transitioned to hospital medicine team.    Is this patient to be included in the SEP-1 Core Measure due to severe sepsis or septic shock? No   Exclusion criteria - the patient is NOT to be included for SEP-1 Core Measure due to: Infection is not suspected    Discharge Time out:  CC Reviewed Yes   Test Results Yes     Vitals:    01/28/23 1345   BP: (!) 121/49   Pulse: 84   Resp: 16   Temp:    SpO2: 97%              FINAL IMPRESSION      1. Intractable nausea and vomiting    2. Lower abdominal pain          DISPOSITION/PLAN   DISPOSITION Decision To Admit 01/28/2023 02:22:36 PM      PATIENT REFERREDTO:  No follow-up provider specified.     DISCHARGE MEDICATIONS:  New Prescriptions    No medications on file       DISCONTINUED MEDICATIONS:  Discontinued Medications    No medications on file              (Please note that portions ofthis note were completed with a voice recognition program.  Efforts were made to edit the dictations but occasionally words are mis-transcribed.)    FIDENCIO Doherty CNP (electronically signed)       FIDENCIO Doherty CNP  01/28/23 7914

## 2023-01-29 LAB
ANION GAP SERPL CALCULATED.3IONS-SCNC: 10 MMOL/L (ref 3–16)
BASOPHILS ABSOLUTE: 0.1 K/UL (ref 0–0.2)
BASOPHILS RELATIVE PERCENT: 0.9 %
BUN BLDV-MCNC: 4 MG/DL (ref 7–20)
CALCIUM SERPL-MCNC: 9 MG/DL (ref 8.3–10.6)
CHLORIDE BLD-SCNC: 108 MMOL/L (ref 99–110)
CO2: 21 MMOL/L (ref 21–32)
CREAT SERPL-MCNC: 0.7 MG/DL (ref 0.6–1.1)
EOSINOPHILS ABSOLUTE: 0 K/UL (ref 0–0.6)
EOSINOPHILS RELATIVE PERCENT: 0.3 %
GFR SERPL CREATININE-BSD FRML MDRD: >60 ML/MIN/{1.73_M2}
GLUCOSE BLD-MCNC: 124 MG/DL (ref 70–99)
GLUCOSE BLD-MCNC: 124 MG/DL (ref 70–99)
GLUCOSE BLD-MCNC: 142 MG/DL (ref 70–99)
GLUCOSE BLD-MCNC: 164 MG/DL (ref 70–99)
GLUCOSE BLD-MCNC: 98 MG/DL (ref 70–99)
HCT VFR BLD CALC: 33.3 % (ref 36–48)
HEMOGLOBIN: 11.1 G/DL (ref 12–16)
LYMPHOCYTES ABSOLUTE: 2.9 K/UL (ref 1–5.1)
LYMPHOCYTES RELATIVE PERCENT: 29.8 %
MCH RBC QN AUTO: 29.9 PG (ref 26–34)
MCHC RBC AUTO-ENTMCNC: 33.2 G/DL (ref 31–36)
MCV RBC AUTO: 89.9 FL (ref 80–100)
MONOCYTES ABSOLUTE: 0.6 K/UL (ref 0–1.3)
MONOCYTES RELATIVE PERCENT: 6.1 %
NEUTROPHILS ABSOLUTE: 6.2 K/UL (ref 1.7–7.7)
NEUTROPHILS RELATIVE PERCENT: 62.9 %
PDW BLD-RTO: 13.5 % (ref 12.4–15.4)
PERFORMED ON: ABNORMAL
PERFORMED ON: NORMAL
PLATELET # BLD: 246 K/UL (ref 135–450)
PMV BLD AUTO: 8.5 FL (ref 5–10.5)
POTASSIUM REFLEX MAGNESIUM: 4.1 MMOL/L (ref 3.5–5.1)
RBC # BLD: 3.7 M/UL (ref 4–5.2)
SODIUM BLD-SCNC: 139 MMOL/L (ref 136–145)
WBC # BLD: 9.8 K/UL (ref 4–11)

## 2023-01-29 PROCEDURE — C9113 INJ PANTOPRAZOLE SODIUM, VIA: HCPCS | Performed by: INTERNAL MEDICINE

## 2023-01-29 PROCEDURE — 1200000000 HC SEMI PRIVATE

## 2023-01-29 PROCEDURE — 36415 COLL VENOUS BLD VENIPUNCTURE: CPT

## 2023-01-29 PROCEDURE — 6360000002 HC RX W HCPCS: Performed by: NURSE PRACTITIONER

## 2023-01-29 PROCEDURE — 80048 BASIC METABOLIC PNL TOTAL CA: CPT

## 2023-01-29 PROCEDURE — 2580000003 HC RX 258: Performed by: INTERNAL MEDICINE

## 2023-01-29 PROCEDURE — 85025 COMPLETE CBC W/AUTO DIFF WBC: CPT

## 2023-01-29 PROCEDURE — 6360000002 HC RX W HCPCS: Performed by: INTERNAL MEDICINE

## 2023-01-29 RX ORDER — LORAZEPAM 2 MG/ML
1 INJECTION INTRAMUSCULAR EVERY 6 HOURS PRN
Status: DISCONTINUED | OUTPATIENT
Start: 2023-01-29 | End: 2023-01-31 | Stop reason: HOSPADM

## 2023-01-29 RX ORDER — METOCLOPRAMIDE HYDROCHLORIDE 5 MG/ML
10 INJECTION INTRAMUSCULAR; INTRAVENOUS EVERY 6 HOURS
Status: DISCONTINUED | OUTPATIENT
Start: 2023-01-29 | End: 2023-01-31 | Stop reason: HOSPADM

## 2023-01-29 RX ORDER — MORPHINE SULFATE 2 MG/ML
2 INJECTION, SOLUTION INTRAMUSCULAR; INTRAVENOUS EVERY 4 HOURS PRN
Status: DISCONTINUED | OUTPATIENT
Start: 2023-01-29 | End: 2023-01-31 | Stop reason: HOSPADM

## 2023-01-29 RX ADMIN — SODIUM CHLORIDE: 9 INJECTION, SOLUTION INTRAVENOUS at 08:57

## 2023-01-29 RX ADMIN — METOCLOPRAMIDE HYDROCHLORIDE 10 MG: 5 INJECTION INTRAMUSCULAR; INTRAVENOUS at 16:55

## 2023-01-29 RX ADMIN — LORAZEPAM 1 MG: 2 INJECTION INTRAMUSCULAR at 16:54

## 2023-01-29 RX ADMIN — Medication 10 ML: at 21:49

## 2023-01-29 RX ADMIN — ONDANSETRON 4 MG: 2 INJECTION INTRAMUSCULAR; INTRAVENOUS at 19:08

## 2023-01-29 RX ADMIN — PANTOPRAZOLE SODIUM 40 MG: 40 INJECTION, POWDER, FOR SOLUTION INTRAVENOUS at 08:54

## 2023-01-29 RX ADMIN — SODIUM CHLORIDE: 9 INJECTION, SOLUTION INTRAVENOUS at 01:16

## 2023-01-29 RX ADMIN — METOCLOPRAMIDE HYDROCHLORIDE 10 MG: 5 INJECTION INTRAMUSCULAR; INTRAVENOUS at 21:46

## 2023-01-29 RX ADMIN — MORPHINE SULFATE 2 MG: 2 INJECTION, SOLUTION INTRAMUSCULAR; INTRAVENOUS at 15:10

## 2023-01-29 RX ADMIN — MORPHINE SULFATE 2 MG: 2 INJECTION, SOLUTION INTRAMUSCULAR; INTRAVENOUS at 21:46

## 2023-01-29 RX ADMIN — LORAZEPAM 1 MG: 2 INJECTION INTRAMUSCULAR at 23:05

## 2023-01-29 RX ADMIN — ONDANSETRON 4 MG: 2 INJECTION INTRAMUSCULAR; INTRAVENOUS at 12:38

## 2023-01-29 RX ADMIN — SODIUM CHLORIDE: 9 INJECTION, SOLUTION INTRAVENOUS at 17:00

## 2023-01-29 ASSESSMENT — PAIN DESCRIPTION - LOCATION
LOCATION: ABDOMEN
LOCATION: ABDOMEN

## 2023-01-29 ASSESSMENT — PAIN DESCRIPTION - DESCRIPTORS: DESCRIPTORS: STABBING;SHARP

## 2023-01-29 ASSESSMENT — PAIN DESCRIPTION - ORIENTATION: ORIENTATION: LOWER

## 2023-01-29 ASSESSMENT — PAIN SCALES - GENERAL
PAINLEVEL_OUTOF10: 0
PAINLEVEL_OUTOF10: 8

## 2023-01-29 NOTE — PROGRESS NOTES
Hospitalist Progress Note      PCP: Leny Cervantes MD    Date of Admission: 1/28/2023    Chief Complaint: Intractable nausea and vomiting x 2 days  Hospital Course:  \" 22 y.o. female who presented to [facility] with above complaint.  She told she had 5 or 6 admission for above complaint since last September.  He has been Following with GI as an outpatient and she had a EGD which was normal.  She is waiting for gastric emptying study.  After getting antiemetics and IV fluids she feels much better.  Currently she does not have any nausea or vomiting.  She thinks she can take some liquid diet at this time.  She denies abdominal pain abdominal distention fever change in mental status diarrhea or any urinary complaints.  There was a concern about his episodes living with menstruation.  Currently she is on hormones to  cut down the frequency   menstruation t.  Currently she gets once in 3 months.  She is a college student, smokes marijuana for anxiety.  She smokes cigarettes occasionally.  She is sexually active\"     Pt was admitted for further eval and treatmet     Subjective: EMR notes reviewed patient seen and examined.  Currently not in any acute distress but does complain that she is having some nausea with the slight advancement of diet to full liquids..  Denies any current vomiting or diarrhea.       Medications:  Reviewed    Infusion Medications    sodium chloride      sodium chloride 125 mL/hr at 01/29/23 0857     Scheduled Medications    sodium chloride flush  5-40 mL IntraVENous 2 times per day    enoxaparin  40 mg SubCUTAneous Daily    pantoprazole  40 mg IntraVENous Daily     PRN Meds: sodium chloride flush, sodium chloride, ondansetron **OR** ondansetron, polyethylene glycol, acetaminophen **OR** acetaminophen    No intake or output data in the 24 hours ending 01/29/23 1434    Physical Exam Performed:    /64   Pulse 59   Temp 97.5 °F (36.4 °C) (Oral)   Resp 16   Ht 5' 2\" (1.575 m)   Wt  140 lb (63.5 kg)   LMP 01/21/2023 (Exact Date)   SpO2 99%   BMI 25.61 kg/m²     General appearance: No apparent distress, appears stated age and cooperative. HEENT: Pupils equal, round, and reactive to light. Conjunctivae/corneas clear. Neck: Supple, with full range of motion. No jugular venous distention. Trachea midline. Respiratory:  Normal respiratory effort. Clear to auscultation, bilaterally without Rales/Wheezes/Rhonchi. Cardiovascular: Regular rate and rhythm with normal S1/S2 without murmurs, rubs or gallops. Abdomen: Soft, non-tender, non-distended with normal bowel sounds. Musculoskeletal: No clubbing, cyanosis or edema bilaterally. Full range of motion without deformity. Skin: Skin color, texture, turgor normal.  No rashes or lesions. Neurologic:  Neurovascularly intact without any focal sensory/motor deficits. Cranial nerves: II-XII intact, grossly non-focal.  Psychiatric: Alert and oriented, thought content appropriate, normal insight  Capillary Refill: Brisk, 3 seconds, normal   Peripheral Pulses: +2 palpable, equal bilaterally       Labs:   Recent Labs     01/28/23  0334 01/28/23  1220 01/29/23  0544   WBC 18.8* 14.1* 9.8   HGB 14.6 12.9 11.1*   HCT 44.0 39.4 33.3*    376 246     Recent Labs     01/28/23  0334 01/28/23  0531 01/28/23  1220 01/28/23  1833 01/29/23  0544      < > 134* 139 139   K 4.2   < > 4.4 4.2 4.1   CL 97*   < > 98* 103 108   CO2 18*   < > 16* 18* 21   BUN 13   < > 9 7 4*   CREATININE 0.7   < > 0.6 0.6 0.7   CALCIUM 10.1   < > 9.3 9.6 9.0   PHOS 3.5  --   --   --   --     < > = values in this interval not displayed. Recent Labs     01/28/23  0334 01/28/23  1220   AST 17 17   ALT 13 12   BILITOT 0.6 0.4   ALKPHOS 71 65     No results for input(s): INR in the last 72 hours.   Recent Labs     01/28/23  1220   TROPONINI <0.01       Urinalysis:      Lab Results   Component Value Date/Time    NITRU Negative 01/28/2023 12:23 PM    [address] 0-2 01/28/2023 12:23 PM BACTERIA 1+ 01/28/2023 04:25 AM    RBCUA 0-2 01/28/2023 12:23 PM    BLOODU TRACE-INTACT 01/28/2023 12:23 PM    SPECGRAV >=1.030 01/28/2023 12:23 PM    GLUCOSEU 500 01/28/2023 12:23 PM       Radiology:  CT ABDOMEN PELVIS W IV CONTRAST Additional Contrast? None   Final Result   No acute intra-abdominal or pelvic abnormality             IP CONSULT TO GI    Assessment/Plan:    Active Hospital Problems    Diagnosis     Cyclical vomiting, intractable [R11.15]      Priority: Medium     Intractable cyclical vomiting-etiology unclear with work-ups thus far does have a history of diabetes mellitus and certainly could have gastroparesis she did trial Reglan in the past and had some success with this. Certainly could also be marijuana induced.   -Tells me that she was never able to set up a gastric emptying study in the outpatient setting because she has difficulty with phone and face-to-face anxiety   -Continue with IV fluids, diet as tolerated, antiemetics, IV Protonix   -Consult GI  -Discussed cessation of marijuana  Pregnancy test negative     Mild leukocytosis-possibly secondary to above, monitor  -Suspect secondary to acute phase reactant leukocytosis has resolved. Anion gap acidosis-no evidence of uremia, possibly secondary dehydration, lactic acid was elevated and improving and beta hydroxybutyrate if anion gap widens  No evidence of infection, does not look like DKA  Blood sugar is 90 and patient improved clinically  Monitor BMP with IV fluids        Diabetes type 1-has insulin pump and which daily insulin according to blood sugar automatically-set in such a way-monitor blood sugar     Smoker- counseled     Marijuana use-counseled       DVT Prophylaxis: Lovenox  Diet: ADULT DIET;  Clear Liquid  Code Status: Full Code  PT/OT Eval Status: NA    Dispo -pending GI consultation and recommendations and toleration of p.o. suspect may be 1 to 2 days    Appropriate for A1 Discharge Unit: No      FIDENCIO Dowd - CNP

## 2023-01-29 NOTE — ED PROVIDER NOTES
Emergency Department Provider Note     Location: [facility]  ED  1/28/2023    I independently performed a history and physical on [facility]. All diagnostic, treatment, and disposition decisions were made by myself in conjunction with resident physician, Dr. Alcala.  I have discussed with the resident the management of this patient. Briefly, this is a 25 y.o. female here for N/V and abdominal pain that started tonight around 10:30pm. Patient reports diffuse abdominal pain but states pain is worse in bilateral lower quadrants. She describes her pain as diffuse crampy abdominal pain. She has history of type 1 diabetes. She states she gets pain like this often. She denies bloody emesis. No significant diarrhea. No other complaints, modifying factors or associated symptoms. ED Triage Vitals [01/28/23 0325]   BP Temp Temp Source Heart Rate Resp SpO2 Height Weight   (!) 149/86 97.6 °F (36.4 °C) Oral 60 16 99 % 5' 2\" (1.575 m) 140 lb (63.5 kg)      Exam showed well-developed well-nourished female, appears uncomfortable, awake alert, abdomen soft, mild tenderness diffusely. No peritoneal signs. Normal bowel sounds.     MDM/ED Course  Labs  Results for orders placed or performed during the hospital encounter of 01/28/23   CBC with Auto Differential   Result Value Ref Range    WBC 18.8 (H) 4.0 - 11.0 K/uL    RBC 4.96 4.00 - 5.20 M/uL    Hemoglobin 14.6 12.0 - 16.0 g/dL    Hematocrit 44.0 36.0 - 48.0 %    MCV 88.8 80.0 - 100.0 fL    MCH 29.4 26.0 - 34.0 pg    MCHC 33.1 31.0 - 36.0 g/dL    RDW 13.4 12.4 - 15.4 %    Platelets 836 996 - 847 K/uL    MPV 8.3 5.0 - 10.5 fL    Neutrophils % 92.4 %    Lymphocytes % 4.9 %    Monocytes % 2.4 %    Eosinophils % 0.0 %    Basophils % 0.3 %    Neutrophils Absolute 17.4 (H) 1.7 - 7.7 K/uL    Lymphocytes Absolute 0.9 (L) 1.0 - 5.1 K/uL    Monocytes Absolute 0.4 0.0 - 1.3 K/uL    Eosinophils Absolute 0.0 0.0 - 0.6 K/uL    Basophils Absolute 0.1 0.0 - 0.2 K/uL Comprehensive Metabolic Panel w/ Reflex to MG   Result Value Ref Range    Sodium 137 136 - 145 mmol/L    Potassium reflex Magnesium 4.2 3.5 - 5.1 mmol/L    Chloride 97 (L) 99 - 110 mmol/L    CO2 18 (L) 21 - 32 mmol/L    Anion Gap 22 (H) 3 - 16    Glucose 221 (H) 70 - 99 mg/dL    BUN 13 7 - 20 mg/dL    Creatinine 0.7 0.6 - 1.1 mg/dL    Est, Glom Filt Rate >60 >60    Calcium 10.1 8.3 - 10.6 mg/dL    Total Protein 8.0 6.4 - 8.2 g/dL    Albumin 5.1 (H) 3.4 - 5.0 g/dL    Albumin/Globulin Ratio 1.8 1.1 - 2.2    Total Bilirubin 0.6 0.0 - 1.0 mg/dL    Alkaline Phosphatase 71 40 - 129 U/L    ALT 13 10 - 40 U/L    AST 17 15 - 37 U/L   Beta-Hydroxybutyrate   Result Value Ref Range    Beta-Hydroxybutyrate 2.92 (H) 0.00 - 0.27 mmol/L   Phosphorus   Result Value Ref Range    Phosphorus 3.5 2.5 - 4.9 mg/dL   Blood gas, venous   Result Value Ref Range    pH, Daniel 7.379 7.350 - 7.450    pCO2, Daniel 31.7 (L) 40.0 - 50.0 mmHg    pO2, Daniel 45.1 (H) 25.0 - 40.0 mmHg    HCO3, Venous 18.3 (L) 23.0 - 29.0 mmol/L    Base Excess, Daniel -5.6 (L) -3.0 - 3.0 mmol/L    O2 Sat, Daniel 81 Not Established %    Carboxyhemoglobin 1.1 0.0 - 1.5 %    MetHgb, Daniel 0.3 <1.5 %    TC02 (Calc), Daniel 19 Not Established mmol/L    O2 Therapy Unknown    Urinalysis with Reflex to Culture    Specimen: Urine   Result Value Ref Range    Color, UA Yellow Straw/Yellow    Clarity, UA Clear Clear    Glucose, Ur 250 (A) Negative mg/dL    Bilirubin Urine Negative Negative    Ketones, Urine >=80 (A) Negative mg/dL    Specific Gravity, UA 1.025 1.005 - 1.030    Blood, Urine Negative Negative    pH, UA 6.0 5.0 - 8.0    Protein, UA TRACE (A) Negative mg/dL    Urobilinogen, Urine 0.2 <2.0 E.U./dL    Nitrite, Urine Negative Negative    Leukocyte Esterase, Urine Negative Negative    Microscopic Examination YES     Urine Type NotGiven     Urine Reflex to Culture Not Indicated    Microscopic Urinalysis   Result Value Ref Range    WBC, UA 0-2 0 - 5 /HPF    RBC, UA 0-2 0 - 4 /HPF Epithelial Cells, UA 2-5 0 - 5 /HPF    Bacteria, UA 1+ (A) None Seen /HPF   Basic Metabolic Panel   Result Value Ref Range    Sodium 143 136 - 145 mmol/L    Potassium 4.7 3.5 - 5.1 mmol/L    Chloride 112 (H) 99 - 110 mmol/L    CO2 18 (L) 21 - 32 mmol/L    Anion Gap 13 3 - 16    Glucose 142 (H) 70 - 99 mg/dL    BUN 11 7 - 20 mg/dL    Creatinine <0.5 (L) 0.6 - 1.1 mg/dL    Est, Glom Filt Rate >60 >60    Calcium 7.4 (L) 8.3 - 10.6 mg/dL   POCT Glucose   Result Value Ref Range    Glucose 191 mg/dL   POCT Glucose   Result Value Ref Range    POC Glucose 196 (H) 70 - 99 mg/dl    Performed on ACCU-CHEK    POCT Glucose   Result Value Ref Range    Glucose 151 mg/dL   POCT Glucose   Result Value Ref Range    POC Glucose 151 (H) 70 - 99 mg/dl    Performed on ACCU-CHEK        - Patient seen and evaluated in room 8.  25 y.o. female presented for abdominal pain, N/V. Clinical presentation was concerning for possible DKA. - CT of the abdomen pelvis considered but patient's abdominal exam overall benign and I have no concern for acute intra-abdominal pathology and therefore I do not believe CT of the abdomen pelvis was indicated. - Patient was placed on telemetry during his/her ED stay and no malignant dysrhythmia observed. - Pertinent old records reviewed; specifically, notes on prior visits reviewed.      - Patient was given    Medications   0.9 % sodium chloride bolus (0 mLs IntraVENous Stopped 1/28/23 0445)   ondansetron (ZOFRAN) injection 4 mg (4 mg IntraVENous Given 1/28/23 0348)   morphine sulfate (PF) injection 4 mg (4 mg IntraVENous Given 1/28/23 0348)   metoclopramide (REGLAN) injection 10 mg (10 mg IntraVENous Given 1/28/23 0352)   promethazine (PHENERGAN) injection 25 mg (25 mg IntraMUSCular Given 1/28/23 0447)   0.9 % sodium chloride bolus (0 mLs IntraVENous Stopped 1/28/23 0637)   morphine sulfate (PF) injection 4 mg (4 mg IntraVENous Given 1/28/23 0445)        Upon reassessment, Mago oHward said she felt better and was no longer vomiting.    -Initial glucose 221 with elevated anion gap of 22 and depressed bicarb. Patient could be in early stages of DKA. VBG showed normal pH but patient has metabolic acidosis that is compensated by respiratory alkalosis. Patient has elevated white count of 18,000 that is nonspecific and could be a stress response. When I reviewed prior labs, I noted that patient often has elevated white count. Given that she is afebrile and has no complaint of infectious symptoms, no further work-up indicated at this time. Urinalysis did not show signs of infection.  -After reviewing patient's labs, I ordered insulin bolus but when I informed the patient about this, she states she already gave herself a bolus of insulin. She does not know how many units because she simply put in her current glucose reading and her insulin pump will automatically calculate the amount of insulin needed. We therefore did not give her any additional insulin but opt to repeat lab instead. -After 2 L of fluid and insulin bolus from patient's insulin pump, patient's glucose improved to 142 and she closed her anion gap. In addition, patient felt better and tolerated p.o. challenge on reassessment. We will plan on discharge home with follow-up with PCP. Patient advised to return if she feels worse or if her nausea and vomiting recurs. - Is this patient to be included in the SEP-1 Core Measure due to severe sepsis or septic shock? No   Exclusion criteria - the patient is NOT to be included for SEP-1 Core Measure due to: Infection is not suspected    I estimate there is LOW risk for ACUTE APPENDICITIS, BOWEL OBSTRUCTION, CHOLECYSTITIS, COMPLICATED DIVERTICULITIS, INCARCERATED HERNIA, PANCREATITIS, PELVIC INFLAMMATORY DISEASE, PERFORATED BOWEL or ULCER, ECTOPIC PREGNANCY, or TUBO-OVARIAN ABSCESS, thus I consider the discharge disposition reasonable.  Also, there is no evidence or peritonitis, sepsis, or estelle Thompson and I have discussed the diagnosis and risks, and we agree with discharging home to follow-up with PCP. We also discussed returning to the Emergency Department immediately if new or worsening symptoms occur. We have discussed the symptoms which are most concerning (e.g., bloody stool, fever, changing or worsening pain, vomiting) that necessitate immediate return. Clinical Impression:  1. Nausea and vomiting, unspecified vomiting type    2. Abdominal pain, unspecified abdominal location          Disposition:  Discharge to home in good condition. Blood pressure 105/71, pulse 78, temperature 99 °F (37.2 °C), temperature source Oral, resp. rate 16, height 5' 2\" (1.575 m), weight 140 lb (63.5 kg), last menstrual period 01/21/2023, SpO2 98 %. Disposition referral (if applicable):  Aniket Fatima MD  [address] [facility] [address]  [address]  [address] [facility] [place]  [phone]    Schedule an appointment as soon as possible for a visit       [facility]  ED  [facility]  [address] [phone]    If symptoms worsen           For further details of [facility] emergency department encounter, please see Dr. Tavon Louis documentation. I, Mariusz Chino, am the primary attending of record and performed a substantive portion of the visit including all aspects of the medical decision making. I personally saw the patient and independently provided 0 minutes of non-concurrent critical care out of the total shared critical care time provided. The critical care time excludes separately billable procedures and updating family. Time spent is specifically for management of the presenting complaint and symptoms initially, direct bedside care, reevaluation, review of records, and consultation. There was a high probability of clinically significant life-threatening deterioration in the patient's condition, which required my urgent intervention.       This chart was generated in part by using [employer] system and may contain errors related to that system including errors in grammar, punctuation, and spelling, as well as words and phrases that may be inappropriate. If there are any questions or concerns please feel free to contact the dictating provider for clarification.         Ena Gracia MD  01/29/23 1016

## 2023-01-29 NOTE — CONSULTS
Consult placed    Who: ZEKE Barker  Date:1/29/2023,  Time:1:29 PM        Electronically signed by Torie Logan on 1/29/2023 at 1:29 PM

## 2023-01-30 VITALS
BODY MASS INDEX: 25.76 KG/M2 | RESPIRATION RATE: 17 BRPM | HEIGHT: 62 IN | SYSTOLIC BLOOD PRESSURE: 150 MMHG | TEMPERATURE: 98.9 F | HEART RATE: 82 BPM | WEIGHT: 140 LBS | DIASTOLIC BLOOD PRESSURE: 79 MMHG | OXYGEN SATURATION: 96 %

## 2023-01-30 LAB
GLUCOSE BLD-MCNC: 100 MG/DL (ref 70–99)
GLUCOSE BLD-MCNC: 153 MG/DL (ref 70–99)
PERFORMED ON: ABNORMAL
PERFORMED ON: ABNORMAL

## 2023-01-30 PROCEDURE — 1200000000 HC SEMI PRIVATE

## 2023-01-30 PROCEDURE — C9113 INJ PANTOPRAZOLE SODIUM, VIA: HCPCS | Performed by: INTERNAL MEDICINE

## 2023-01-30 PROCEDURE — 6360000002 HC RX W HCPCS: Performed by: INTERNAL MEDICINE

## 2023-01-30 PROCEDURE — 2580000003 HC RX 258: Performed by: INTERNAL MEDICINE

## 2023-01-30 PROCEDURE — 6360000002 HC RX W HCPCS: Performed by: NURSE PRACTITIONER

## 2023-01-30 RX ADMIN — ONDANSETRON 4 MG: 2 INJECTION INTRAMUSCULAR; INTRAVENOUS at 07:47

## 2023-01-30 RX ADMIN — PANTOPRAZOLE SODIUM 40 MG: 40 INJECTION, POWDER, FOR SOLUTION INTRAVENOUS at 07:47

## 2023-01-30 RX ADMIN — ONDANSETRON 4 MG: 2 INJECTION INTRAMUSCULAR; INTRAVENOUS at 13:29

## 2023-01-30 RX ADMIN — MORPHINE SULFATE 2 MG: 2 INJECTION, SOLUTION INTRAMUSCULAR; INTRAVENOUS at 06:45

## 2023-01-30 RX ADMIN — Medication 10 ML: at 23:39

## 2023-01-30 RX ADMIN — ONDANSETRON 4 MG: 2 INJECTION INTRAMUSCULAR; INTRAVENOUS at 01:24

## 2023-01-30 RX ADMIN — MORPHINE SULFATE 2 MG: 2 INJECTION, SOLUTION INTRAMUSCULAR; INTRAVENOUS at 11:22

## 2023-01-30 RX ADMIN — METOCLOPRAMIDE HYDROCHLORIDE 10 MG: 5 INJECTION INTRAMUSCULAR; INTRAVENOUS at 23:39

## 2023-01-30 RX ADMIN — METOCLOPRAMIDE HYDROCHLORIDE 10 MG: 5 INJECTION INTRAMUSCULAR; INTRAVENOUS at 11:21

## 2023-01-30 RX ADMIN — MORPHINE SULFATE 2 MG: 2 INJECTION, SOLUTION INTRAMUSCULAR; INTRAVENOUS at 02:07

## 2023-01-30 RX ADMIN — METOCLOPRAMIDE HYDROCHLORIDE 10 MG: 5 INJECTION INTRAMUSCULAR; INTRAVENOUS at 17:41

## 2023-01-30 RX ADMIN — SODIUM CHLORIDE: 9 INJECTION, SOLUTION INTRAVENOUS at 04:59

## 2023-01-30 RX ADMIN — METOCLOPRAMIDE HYDROCHLORIDE 10 MG: 5 INJECTION INTRAMUSCULAR; INTRAVENOUS at 05:03

## 2023-01-30 ASSESSMENT — PAIN DESCRIPTION - LOCATION
LOCATION: ABDOMEN

## 2023-01-30 ASSESSMENT — PAIN SCALES - GENERAL
PAINLEVEL_OUTOF10: 5
PAINLEVEL_OUTOF10: 8
PAINLEVEL_OUTOF10: 3
PAINLEVEL_OUTOF10: 5
PAINLEVEL_OUTOF10: 0

## 2023-01-30 ASSESSMENT — PAIN - FUNCTIONAL ASSESSMENT: PAIN_FUNCTIONAL_ASSESSMENT: ACTIVITIES ARE NOT PREVENTED

## 2023-01-30 ASSESSMENT — PAIN DESCRIPTION - DESCRIPTORS
DESCRIPTORS: ACHING
DESCRIPTORS: ACHING

## 2023-01-30 NOTE — CARE COORDINATION
CASE MANAGEMENT INITIAL ASSESSMENT      Reviewed chart and completed assessment with patient:Bedside  Family present: yes  Explained Case Management role/services.yes    Primary contact information:Jonna    Health Care Decision Maker :   Primary Decision Maker: Jonna Olvera - Parent - [phone]          Can this person be reached and be able to respond quickly, such as within a few minutes or hours? Yes      Admit date/status:1/28/23/ Inp  Diagnosis:Lower Abd. pain   Is this a Readmission?:  No    Readmission Screening completed?: No     Insurance:[employer]R   Precert required for SNF: Yes       3 night stay required: No    Living arrangements, Adls, care needs, prior to admission:Patient lives with family members. IPTA    Durable Medical Equipment at home:  Walker__Cane__RTS__ BSC__Shower Chair__  02__ HHN__ CPAP__  BiPap__  Hospital Bed__ W/C___ Other_____    Services in the home and/or outpatient, prior to admission:none    Current PCP:Leny Cervantes                  Medications: Prescription coverage?Yes     Transportation needs: none         PT/OT recs:none    Hospital Exemption Notification (HEN):needed for SNF    Barriers to discharge:none    Plan/comments:Patient plans to return home with family members. Patient is IPTA, and is a full time student. Patient drives and has no issues getting to medical appts, and getting meds. Patient is denying any needs at this time.       Oralia Queen RN

## 2023-01-30 NOTE — PLAN OF CARE
Tolerating regular diet today. Only one dose of pain medicine given. Pt up in room ambulating.   Problem: Discharge Planning  Goal: Discharge to home or other facility with appropriate resources  Outcome: Progressing     Problem: Safety - Adult  Goal: Free from fall injury  Outcome: Progressing     Problem: Pain  Goal: Verbalizes/displays adequate comfort level or baseline comfort level  Outcome: Progressing

## 2023-01-30 NOTE — PROGRESS NOTES
Hospitalist Progress Note      PCP: Sona Hernandez MD    Date of Admission: 1/28/2023    Chief Complaint:   Intractable nausea and vomiting x 2 days    Hospital Course:   \" 25 y.o. female who presented to [facility] with above complaint. She told she had 5 or 6 admission for above complaint since last September. He has been Following with GI as an outpatient and she had a EGD which was normal.  She is waiting for gastric emptying study. After getting antiemetics and IV fluids she feels much better. Currently she does not have any nausea or vomiting. She thinks she can take some liquid diet at this time. She denies abdominal pain abdominal distention fever change in mental status diarrhea or any urinary complaints. There was a concern about his episodes living with menstruation. Currently she is on hormones to  cut down the frequency   menstruation t. Currently she gets once in 3 months. She is a college student, smokes marijuana for anxiety. She smokes cigarettes occasionally. She is sexually active\"      Subjective:   Seen ambulating in room with sister at bedside, states N/V improving, no emesis overnight or this morning, tolerating liquid diet.         Medications:  Reviewed    Infusion Medications    sodium chloride      sodium chloride 125 mL/hr at 01/30/23 3759     Scheduled Medications    metoclopramide  10 mg IntraVENous Q6H    sodium chloride flush  5-40 mL IntraVENous 2 times per day    enoxaparin  40 mg SubCUTAneous Daily    pantoprazole  40 mg IntraVENous Daily     PRN Meds: morphine, LORazepam, sodium chloride flush, sodium chloride, ondansetron **OR** ondansetron, polyethylene glycol, acetaminophen **OR** acetaminophen    No intake or output data in the 24 hours ending 01/30/23 1234    Physical Exam Performed:    /85   Pulse 73   Temp 99.4 °F (37.4 °C) (Oral)   Resp 18   Ht 5' 2\" (1.575 m)   Wt 140 lb (63.5 kg)   LMP 01/21/2023 (Exact Date)   SpO2 99%   BMI 25.61 kg/m²     General appearance: No apparent distress, appears stated age and cooperative. HEENT: Pupils equal, round, and reactive to light. Conjunctivae/corneas clear. Neck: Supple, with full range of motion. No jugular venous distention. Trachea midline. Respiratory:  Normal respiratory effort. Clear to auscultation, bilaterally without Rales/Wheezes/Rhonchi. Cardiovascular: Regular rate and rhythm with normal S1/S2 without murmurs, rubs or gallops. Abdomen: Soft, non-tender, non-distended with normal bowel sounds. Musculoskeletal: No clubbing, cyanosis or edema bilaterally. Full range of motion without deformity. Skin: Skin color, texture, turgor normal.  No rashes or lesions. Neurologic:  Neurovascularly intact without any focal sensory/motor deficits. Cranial nerves: II-XII intact, grossly non-focal.  Psychiatric: Alert and oriented, thought content appropriate, normal insight  Capillary Refill: Brisk, 3 seconds, normal   Peripheral Pulses: +2 palpable, equal bilaterally       Labs:   Recent Labs     01/28/23  0334 01/28/23  1220 01/29/23  0544   WBC 18.8* 14.1* 9.8   HGB 14.6 12.9 11.1*   HCT 44.0 39.4 33.3*    376 246     Recent Labs     01/28/23  0334 01/28/23  0531 01/28/23  1220 01/28/23  1833 01/29/23  0544      < > 134* 139 139   K 4.2   < > 4.4 4.2 4.1   CL 97*   < > 98* 103 108   CO2 18*   < > 16* 18* 21   BUN 13   < > 9 7 4*   CREATININE 0.7   < > 0.6 0.6 0.7   CALCIUM 10.1   < > 9.3 9.6 9.0   PHOS 3.5  --   --   --   --     < > = values in this interval not displayed. Recent Labs     01/28/23  0334 01/28/23  1220   AST 17 17   ALT 13 12   BILITOT 0.6 0.4   ALKPHOS 71 65     No results for input(s): INR in the last 72 hours.   Recent Labs     01/28/23  1220   TROPONINI <0.01       Urinalysis:      Lab Results   Component Value Date/Time    NITRU Negative 01/28/2023 12:23 PM    [address] 0-2 01/28/2023 12:23 PM    BACTERIA 1+ 01/28/2023 04:25 AM    RBCUA 0-2 01/28/2023 12:23 PM BLOODU TRACE-INTACT 01/28/2023 12:23 PM    SPECGRAV >=1.030 01/28/2023 12:23 PM    GLUCOSEU 500 01/28/2023 12:23 PM       Radiology:  CT ABDOMEN PELVIS W IV CONTRAST Additional Contrast? None   Final Result   No acute intra-abdominal or pelvic abnormality             IP CONSULT TO GI    Assessment/Plan:    Active Hospital Problems    Diagnosis     Cyclical vomiting, intractable [R11.15]      Priority: Medium     Intractable cyclical vomiting-etiology unclear with work-ups thus far does have a history of diabetes mellitus and certainly could have gastroparesis she did trial Reglan in the past and had some success   -Never able to set up a gastric emptying study in the outpatient setting, scheduled today   -Continue with IV fluids, diet as tolerated, antiemetics, IV Protonix   -Consult GI  -Pregnancy test negative     Mild leukocytosis-possibly secondary to above, monitor  -Suspect secondary to acute phase reactant, leukocytosis has resolved. Anion gap acidosis-no evidence of uremia, possibly secondary dehydration, lactic acid was elevated and improving and beta hydroxybutyrate if anion gap widens  -No evidence of infection, does not look like DKA  -Blood sugar is within reasonable range and clinically improved  -Monitor BMP with IV fluids     Diabetes type 1-has insulin pump and which daily insulin according to blood sugar automatically-set in such a way-monitor blood sugar     Smoker- counseled     Marijuana use-counseled       DVT Prophylaxis: Lovenox  Diet: ADULT DIET;  Clear Liquid  Code Status: Full Code  PT/OT Eval Status: Not ordered     Dispo - Likely tomorrow pending tolerating diet     Appropriate for A1 Discharge Unit: No      ADAMARIS Grissom

## 2023-01-30 NOTE — PLAN OF CARE
Pt A&Ox4, quiet in bed, no signs of any distress, VSS, NS @ 125 ml/hr infusing into L forearm IV, family at bedside, assessment complete, HS blood sugar 142, no other needs at this time.

## 2023-01-30 NOTE — CONSULTS
Tammy Weber is a 25 y.o. female asked to see us in consultation by  Nancy Hinds MD & No att. providers found for evaluation of intractable vomiting. Ms. Sweetie Montaño is a 58-year-old female with past medical history of insulin-dependent diabetes, ADHD, chronic headaches, marijuana use and anxiety. She presents to the ER with intractable vomiting. This is chronic. Most recent episode began 2 days ago. Episodes typically are triggered by stress related to school and sometimes associated with her menstrual cycle, her last which was one week ago. The patient took an online test yesterday and states symptoms worsened after. She does not take NSAIDs. She is not on PPI. She has cut down on how much marijuana she uses, now smoking 1-2 per week. She was admitted in November and seen by our service for these symptoms. She improved within 48 hours. She had been started on Reglan prior to that admission after she work up with EGD, RUQ US and HIDA scan were non-revealing. At that time she had reported increased stress about an academic test coming up. She states that she rarely had to take Reglan throughout December as she was feeling fine. She does note that she was not in classes during this time. We had discussed her following up with her PCP in regards to her anxiety but she has not done this yet. She does not like going to the doctor. She does have a GES ordered but had not scheduled it yet. Hgb A1c was 6.3 in November. Repeat CT A/P on admission was unremarkable. Fatty infiltration is again noted. White count was elevated at 18,000, now normal.  LFTs and lipase are normal.     She had been seen in the past by gastroenterology at [facility]..  She had some autoimmune work-up completed with a  positive CORINNA found in her records.   She does not have a family history of irritable bowel disease. Medications Prior to Admission: cloNIDine (CATAPRES) 0.2 MG tablet, Take 0.2 mg by mouth nightly as needed  metoclopramide (REGLAN) 10 MG tablet, Take 1 tablet by mouth 3 times daily as needed (nausea/vomiting)  HUMALOG 100 UNIT/ML injection vial, Insulin pump w/ continuous monitor  [DISCONTINUED] amitriptyline (ELAVIL) 50 MG tablet,   [DISCONTINUED] SPRINTEC 28 0.25-35 MG-MCG per tablet,   [DISCONTINUED] insulin glargine (LANTUS) 100 UNIT/ML injection, Inject  into the skin nightly. (Patient not taking: Reported on 1/28/2023)    Medication:   metoclopramide  10 mg IntraVENous Q6H    sodium chloride flush  5-40 mL IntraVENous 2 times per day    enoxaparin  40 mg SubCUTAneous Daily    pantoprazole  40 mg IntraVENous Daily      sodium chloride      sodium chloride 125 mL/hr at 01/30/23 0459       Allergies:  No Known Allergies    Immunizations:  Immunization History   Administered Date(s) Administered    COVID-19, PFIZER PURPLE top, DILUTE for use, (age 15 y+), 30mcg/0.3mL 07/23/2021, 08/14/2021       Family history, past medical history, and  social  history  are  reviewed as  below. Past Medical  History:  Past Medical History:   Diagnosis Date    ADHD     Diabetes mellitus ([facility] [address]        Past  Surgical History:  Past Surgical History:   Procedure Laterality Date    TONSILLECTOMY         Family  History:  History reviewed. No pertinent family history.     Social History:  Social History     Tobacco Use    Smoking status: Never    Smokeless tobacco: Never   Vaping Use    Vaping Use: Never used   Substance Use Topics    Alcohol use: Yes     Comment: rarely    Drug use: Yes     Types: Marijuana (Weed)       ROS  Constitutional:  Denies fever,sweats, chills or weight loss  Eyes:  Denies change in visual acuity   HENT:  Denies hearing loss or dizziness  Respiratory:  Denies cough or shortness of breath   Cardiovascular:  Denies edema or chest pain  :  Denies dysuria, hematuria, urgency or frequency  Musculoskeletal:  +  back pain or joint pain   Integument:  Denies rash   Neurologic:  Denies headache, previous stroke, TIA, confusion   Endocrine:  Denies polyuria or polydipsia   Lymphatic:  Denies swollen glands   Psychiatric:  +  depression or anxiety   Hematologic: +  previous anemia or easy bruising    All other review of systems negative, except for those noted. PHYSICAL EXAM:   VITAL SIGNS: /85   Pulse 73   Temp 99.4 °F (37.4 °C) (Oral)   Resp 18   Ht 5' 2\" (1.575 m)   Wt 140 lb (63.5 kg)   LMP 01/21/2023 (Exact Date)   SpO2 99%   BMI 25.61 kg/m²       Constitutional: Well developed. Well nourished. Non-toxic appearance. No acute distress. HENT: Normocephalic. Atraumatic. Bilateral external ears normal, Oropharynx moist.  No oral exudate. Nose normal.   Eyes: No  Scleral icterus    Cardiovascular: RRR  Thorax & Lungs: Non labored at rest, no respiratory distress  Abdomen: soft, NT. No guarding, rebound tenderness. No hepatomegaly. No splenomegaly. No ascites  Rectal:  Deferred. Skin: Warm, dry. No erythema. No rash. Extremities:  No edema. Neurologic: Alert & oriented x 3    RESULTS    Lab Results   Component Value Date    ALT 12 01/28/2023    AST 17 01/28/2023    ALKPHOS 65 01/28/2023    PROT 7.5 01/28/2023    LABALBU 5.0 01/28/2023    LIPASE 6.0 (L) 01/28/2023     Lab Results   Component Value Date    WBC 9.8 01/29/2023    HGB 11.1 (L) 01/29/2023    HCT 33.3 (L) 01/29/2023    MCV 89.9 01/29/2023     01/29/2023     Lab Results   Component Value Date    CREATININE 0.7 01/29/2023    BUN 4 (L) 01/29/2023     01/29/2023    K 4.1 01/29/2023     01/29/2023    CO2 21 01/29/2023       IMAGES  CT ABDOMEN PELVIS W IV CONTRAST Additional Contrast? None    Result Date: 1/28/2023  No acute intra-abdominal or pelvic abnormality           Assessment:  1. Intractable nausea, vomiting. Cyclical pattern.  Occurring around the time of menstruation but strongly linked with stress related to school, therefore question psychogenic component in setting of unremarkable GI work-up. She has yet to have GES. HgbA1c suggests adequate control of her DM but gastroparesis still possible. Symptoms do not necessarily fit cannabis hyperemesis syndrome but continued use certainly could be contributing. 2. Anxiety. Plan:  Continue IVF. Can advance diet as tolerated. Reglan scheduled, Zofran as needed. Empiric PPI. Patient to call central scheduling and set up outpatient GES today. Will need to stop her Reglan and any narcotics at least 48 hours prior to the test.   Needs to follow-up with PCP in regards to anxiety. Thank you for permitting us to participate in the care of your patient. Please do not hesitate to call with questions or concerns. 1.  The patient indicates understanding of these issues and agrees with the plan. 2.  I reviewed the patient's medical information and medical history. 3.  I have reviewed the past medical, family, and social history sections including the medications and allergies listed in the above medical record.         Electronically signed by: FIDENCIO Brizuela 1/30/2023 9:52 AM

## 2023-01-31 RX ORDER — ONDANSETRON 8 MG/1
8 TABLET, ORALLY DISINTEGRATING ORAL EVERY 12 HOURS PRN
Qty: 28 TABLET | Refills: 0 | Status: SHIPPED | OUTPATIENT
Start: 2023-01-31 | End: 2023-02-14

## 2023-01-31 NOTE — DISCHARGE SUMMARY
Hospital Medicine Discharge Summary    Patient ID: Kimberly Cloud      Patient's PCP: Rosas Villeda MD    Admit Date: 1/28/2023     Discharge Date: 1/31/2023      Admitting Provider: Jazlyn Dumont DO     Discharge Provider: ADAMARIS Jones     Discharge Diagnoses: Active Hospital Problems    Diagnosis     Cyclical vomiting, intractable [R11.15]      Priority: Medium       The patient was seen and examined on day of discharge and this discharge summary is in conjunction with any daily progress note from day of discharge. Hospital Course:   \" 25 y.o. female who presented to [facility] with above complaint. She told she had 5 or 6 admission for above complaint since last September. He has been Following with GI as an outpatient and she had a EGD which was normal.  She is waiting for gastric emptying study. After getting antiemetics and IV fluids she feels much better. Currently she does not have any nausea or vomiting. She thinks she can take some liquid diet at this time. She denies abdominal pain abdominal distention fever change in mental status diarrhea or any urinary complaints. There was a concern about his episodes living with menstruation. Currently she is on hormones to  cut down the frequency   menstruation t. Currently she gets once in 3 months. She is a college student, smokes marijuana for anxiety. She smokes cigarettes occasionally.   She is sexually active\"      Intractable cyclical vomiting-etiology unclear with work-ups thus far does have a history of diabetes mellitus and certainly could have gastroparesis she did trial Reglan in the past and had some success   -Continue diet as tolerated, antiemetics  -Consult GI, gastric emptying study scheduled for Feb 14th   -Pregnancy test negative     Mild leukocytosis possibly secondary to above  -Suspect secondary to acute phase reactant, leukocytosis has resolved       Anion gap acidosis-no evidence of uremia, possibly secondary dehydration,   -Lactic acid was initially elevated however improved   -No evidence of infection, does not look like DKA  -Blood sugar is within reasonable range and clinically improved     Diabetes type 1-has insulin pump and which daily insulin according to blood sugar automatically-set in such a way-monitor blood sugar     Smoker  -Counseled     Marijuana use  -Counseled      Physical Exam Performed:     BP (!) 150/79   Pulse 82   Temp 98.9 °F (37.2 °C) (Oral)   Resp 17   Ht 5' 2\" (1.575 m)   Wt 140 lb (63.5 kg)   LMP 01/21/2023 (Exact Date)   SpO2 96%   BMI 25.61 kg/m²       General appearance:  No apparent distress, appears stated age and cooperative. HEENT:  Normal cephalic, atraumatic without obvious deformity. Pupils equal, round, and reactive to light. Extra ocular muscles intact. Conjunctivae/corneas clear. Neck: Supple, with full range of motion. No jugular venous distention. Trachea midline. Respiratory:  Normal respiratory effort. Clear to auscultation, bilaterally without Rales/Wheezes/Rhonchi. Cardiovascular:  Regular rate and rhythm with normal S1/S2 without murmurs, rubs or gallops. Abdomen: Soft, non-tender, non-distended with normal bowel sounds. Musculoskeletal:  No clubbing, cyanosis or edema bilaterally. Full range of motion without deformity. Skin: Skin color, texture, turgor normal.  No rashes or lesions. Neurologic:  Neurovascularly intact without any focal sensory/motor deficits. Cranial nerves: II-XII intact, grossly non-focal.  Psychiatric:  Alert and oriented, thought content appropriate, normal insight  Capillary Refill: Brisk,< 3 seconds   Peripheral Pulses: +2 palpable, equal bilaterally       Labs:  For convenience and continuity at follow-up the following most recent labs are provided:      CBC:    Lab Results   Component Value Date/Time    WBC 9.8 01/29/2023 05:44 AM    HGB 11.1 01/29/2023 05:44 AM    HCT 33.3 01/29/2023 05:44 AM     01/29/2023 05:44 AM       Renal:    Lab Results   Component Value Date/Time     01/29/2023 05:44 AM    K 4.1 01/29/2023 05:44 AM     01/29/2023 05:44 AM    CO2 21 01/29/2023 05:44 AM    BUN 4 01/29/2023 05:44 AM    CREATININE 0.7 01/29/2023 05:44 AM    CALCIUM 9.0 01/29/2023 05:44 AM    PHOS 3.5 01/28/2023 03:34 AM         Significant Diagnostic Studies    Radiology:   CT ABDOMEN PELVIS W IV CONTRAST Additional Contrast? None   Final Result   No acute intra-abdominal or pelvic abnormality                Consults:     IP CONSULT TO GI    Disposition:  Home     Condition at Discharge: Stable    Discharge Instructions/Follow-up:    Follow-up with GI  Follow-up with PCP as needed     Code Status:  Full Code     Activity: activity as tolerated    Diet: regular diet      Discharge Medications:     Discharge Medication List as of 1/31/2023  9:39 AM             Details   ondansetron (ZOFRAN-ODT) 8 MG TBDP disintegrating tablet Place 1 tablet under the tongue every 12 hours as needed for Nausea or Vomiting, Disp-28 tablet, R-0, DAWNormal                Details   cloNIDine (CATAPRES) 0.2 MG tablet Take 0.2 mg by mouth nightly as neededHistorical Med      metoclopramide (REGLAN) 10 MG tablet Take 1 tablet by mouth 3 times daily as needed (nausea/vomiting), Disp-42 tablet, R-0Normal      HUMALOG 100 UNIT/ML injection vial Insulin pump w/ continuous monitor, DAWHistorical Med             Time Spent on discharge: 33 minutes in the examination, evaluation, counseling and review of medications and discharge plan. Signed:    ADAMARIS Chan   1/31/2023      Thank you Timothy Ceballos MD for the opportunity to be involved in this patient's care. If you have any questions or concerns, please feel free to contact me at [phone].

## 2023-01-31 NOTE — PROGRESS NOTES
PROGRESS NOTE    HPI: Danitza Batista is a(n)22 y.o. female admitted for work-up and treatment for Lower abdominal pain [R10.30]  Intractable nausea and vomiting [D78.4]  Cyclical vomiting, intractable [R11.15]. We are following for intractable vomiting. Subjective:     Improved. Tolerating diet. No nausea. Objective:     I/O last 3 completed shifts: In: 720 [P.O.:720]  Out: -       BP (!) 150/79   Pulse 82   Temp 98.9 °F (37.2 °C) (Oral)   Resp 17   Ht 5' 2\" (1.575 m)   Wt 140 lb (63.5 kg)   LMP 01/21/2023 (Exact Date)   SpO2 96%   BMI 25.61 kg/m²     Physical Exam:  HEENT: anicteric sclera, oropharyngeal membranes pink and moist.  Cor: RRR  Lungs: non-labored, no respiratory distress  Abdomen: soft, NT. No ascites. No hepatomegaly or splenomegaly  Extremities: no edema  Neuro: alert and oriented x 3, no asterixis      Results:   Lab Results   Component Value Date    ALT 12 01/28/2023    AST 17 01/28/2023    ALKPHOS 65 01/28/2023    PROT 7.5 01/28/2023    LABALBU 5.0 01/28/2023    LIPASE 6.0 (L) 01/28/2023     Lab Results   Component Value Date    WBC 9.8 01/29/2023    HGB 11.1 (L) 01/29/2023    HCT 33.3 (L) 01/29/2023    MCV 89.9 01/29/2023     01/29/2023     BUN/Cr/glu/ALT/AST/amyl/lip:  4/0.7/--/--/--/--/-- (01/29 0544)  CT ABDOMEN PELVIS W IV CONTRAST Additional Contrast? None    Result Date: 1/28/2023  No acute intra-abdominal or pelvic abnormality         Impression:  1. Intractable nausea, vomiting. Cyclical pattern. Occurring around the time of menstruation but strongly linked with stress related to school, therefore question psychogenic component in setting of unremarkable GI work-up. She has yet to have GES. HgbA1c suggests adequate control of her DM but gastroparesis still possible. Symptoms do not necessarily fit cannabis hyperemesis syndrome but continued use certainly could be contributing. 2. Anxiety. Plan:  Okay to discharge from GI standpoint. GES scheduled for 2/14. She knows to stop her reglan and any narcotics at least 2 days prior. Follow-up with Dr. Eloisa Laguerre after GES. Please do not hesitate to call with questions or concerns.       Electronically signed by: FIDENCIO Kramer 1/31/2023 5:41 PM

## 2023-02-14 ENCOUNTER — HOSPITAL ENCOUNTER (OUTPATIENT)
Dept: NUCLEAR MEDICINE | Age: 23
Discharge: HOME | End: 2023-02-14
Payer: COMMERCIAL

## 2023-02-14 DIAGNOSIS — R10.10 CHRONIC UPPER ABDOMINAL PAIN: ICD-10-CM

## 2023-02-14 DIAGNOSIS — G89.29 CHRONIC UPPER ABDOMINAL PAIN: ICD-10-CM

## 2023-02-14 PROCEDURE — 78264 GASTRIC EMPTYING IMG STUDY: CPT

## 2023-02-14 PROCEDURE — A9541 TC99M SULFUR COLLOID: HCPCS | Performed by: INTERNAL MEDICINE

## 2023-02-14 PROCEDURE — 3430000000 HC RX DIAGNOSTIC RADIOPHARMACEUTICAL: Performed by: INTERNAL MEDICINE

## 2023-02-14 RX ADMIN — Medication 1 MILLICURIE: at 10:54

## 2023-07-19 ENCOUNTER — HOSPITAL ENCOUNTER (EMERGENCY)
Age: 23
Discharge: HOME | End: 2023-07-19
Attending: EMERGENCY MEDICINE
Payer: COMMERCIAL

## 2023-07-19 VITALS
SYSTOLIC BLOOD PRESSURE: 125 MMHG | HEIGHT: 62 IN | HEART RATE: 71 BPM | OXYGEN SATURATION: 96 % | WEIGHT: 145 LBS | DIASTOLIC BLOOD PRESSURE: 74 MMHG | TEMPERATURE: 97.6 F | RESPIRATION RATE: 20 BRPM | BODY MASS INDEX: 26.68 KG/M2

## 2023-07-19 DIAGNOSIS — E10.65 HYPERGLYCEMIA DUE TO TYPE 1 DIABETES MELLITUS (HCC): ICD-10-CM

## 2023-07-19 DIAGNOSIS — R10.13 EPIGASTRIC PAIN: ICD-10-CM

## 2023-07-19 DIAGNOSIS — R11.2 NAUSEA AND VOMITING, UNSPECIFIED VOMITING TYPE: Primary | ICD-10-CM

## 2023-07-19 LAB
ALBUMIN SERPL-MCNC: 4.7 G/DL (ref 3.4–5)
ALBUMIN/GLOB SERPL: 1.4 {RATIO} (ref 1.1–2.2)
ALP SERPL-CCNC: 82 U/L (ref 40–129)
ALT SERPL-CCNC: 17 U/L (ref 10–40)
ANION GAP SERPL CALCULATED.3IONS-SCNC: 23 MMOL/L (ref 3–16)
AST SERPL-CCNC: 20 U/L (ref 15–37)
BACTERIA URNS QL MICRO: ABNORMAL /HPF
BASOPHILS # BLD: 0.1 K/UL (ref 0–0.2)
BASOPHILS NFR BLD: 0.5 %
BILIRUB SERPL-MCNC: 0.5 MG/DL (ref 0–1)
BILIRUB UR QL STRIP.AUTO: ABNORMAL
BUN SERPL-MCNC: 18 MG/DL (ref 7–20)
CALCIUM SERPL-MCNC: 9.9 MG/DL (ref 8.3–10.6)
CHLORIDE SERPL-SCNC: 100 MMOL/L (ref 99–110)
CLARITY UR: CLEAR
CO2 SERPL-SCNC: 14 MMOL/L (ref 21–32)
COLOR UR: YELLOW
CREAT SERPL-MCNC: 0.8 MG/DL (ref 0.6–1.1)
DEPRECATED RDW RBC AUTO: 13.3 % (ref 12.4–15.4)
EOSINOPHIL # BLD: 0 K/UL (ref 0–0.6)
EOSINOPHIL NFR BLD: 0 %
EPI CELLS #/AREA URNS HPF: ABNORMAL /HPF (ref 0–5)
GFR SERPLBLD CREATININE-BSD FMLA CKD-EPI: >60 ML/MIN/{1.73_M2}
GLUCOSE SERPL-MCNC: 252 MG/DL (ref 70–99)
GLUCOSE UR STRIP.AUTO-MCNC: 500 MG/DL
HCG SERPL QL: NEGATIVE
HCT VFR BLD AUTO: 43.3 % (ref 36–48)
HGB BLD-MCNC: 14.6 G/DL (ref 12–16)
HGB UR QL STRIP.AUTO: ABNORMAL
HYALINE CASTS #/AREA URNS LPF: ABNORMAL /LPF (ref 0–2)
KETONES UR STRIP.AUTO-MCNC: 40 MG/DL
LEUKOCYTE ESTERASE UR QL STRIP.AUTO: NEGATIVE
LIPASE SERPL-CCNC: 6 U/L (ref 13–60)
LYMPHOCYTES # BLD: 0.9 K/UL (ref 1–5.1)
LYMPHOCYTES NFR BLD: 4.7 %
MCH RBC QN AUTO: 29.9 PG (ref 26–34)
MCHC RBC AUTO-ENTMCNC: 33.7 G/DL (ref 31–36)
MCV RBC AUTO: 88.8 FL (ref 80–100)
MONOCYTES # BLD: 0.3 K/UL (ref 0–1.3)
MONOCYTES NFR BLD: 1.6 %
MUCOUS THREADS #/AREA URNS LPF: ABNORMAL /LPF
NEUTROPHILS # BLD: 17.5 K/UL (ref 1.7–7.7)
NEUTROPHILS NFR BLD: 93.2 %
NITRITE UR QL STRIP.AUTO: NEGATIVE
PH UR STRIP.AUTO: 6 [PH] (ref 5–8)
PLATELET # BLD AUTO: 469 K/UL (ref 135–450)
PMV BLD AUTO: 8.5 FL (ref 5–10.5)
POTASSIUM SERPL-SCNC: 4.5 MMOL/L (ref 3.5–5.1)
PROT SERPL-MCNC: 8.1 G/DL (ref 6.4–8.2)
PROT UR STRIP.AUTO-MCNC: >=300 MG/DL
RBC # BLD AUTO: 4.88 M/UL (ref 4–5.2)
RBC #/AREA URNS HPF: ABNORMAL /HPF (ref 0–4)
SODIUM SERPL-SCNC: 137 MMOL/L (ref 136–145)
SP GR UR STRIP.AUTO: >=1.03 (ref 1–1.03)
UA COMPLETE W REFLEX CULTURE PNL UR: ABNORMAL
UA DIPSTICK W REFLEX MICRO PNL UR: YES
URN SPEC COLLECT METH UR: ABNORMAL
UROBILINOGEN UR STRIP-ACNC: 0.2 E.U./DL
WBC # BLD AUTO: 18.8 K/UL (ref 4–11)
WBC #/AREA URNS HPF: ABNORMAL /HPF (ref 0–5)

## 2023-07-19 PROCEDURE — 99284 EMERGENCY DEPT VISIT MOD MDM: CPT

## 2023-07-19 PROCEDURE — 6370000000 HC RX 637 (ALT 250 FOR IP): Performed by: EMERGENCY MEDICINE

## 2023-07-19 PROCEDURE — 6360000002 HC RX W HCPCS: Performed by: EMERGENCY MEDICINE

## 2023-07-19 PROCEDURE — 96361 HYDRATE IV INFUSION ADD-ON: CPT

## 2023-07-19 PROCEDURE — 96375 TX/PRO/DX INJ NEW DRUG ADDON: CPT

## 2023-07-19 PROCEDURE — 85025 COMPLETE CBC W/AUTO DIFF WBC: CPT

## 2023-07-19 PROCEDURE — 2580000003 HC RX 258: Performed by: EMERGENCY MEDICINE

## 2023-07-19 PROCEDURE — 83690 ASSAY OF LIPASE: CPT

## 2023-07-19 PROCEDURE — 96374 THER/PROPH/DIAG INJ IV PUSH: CPT

## 2023-07-19 PROCEDURE — 80053 COMPREHEN METABOLIC PANEL: CPT

## 2023-07-19 PROCEDURE — 84703 CHORIONIC GONADOTROPIN ASSAY: CPT

## 2023-07-19 PROCEDURE — 81001 URINALYSIS AUTO W/SCOPE: CPT

## 2023-07-19 RX ORDER — ONDANSETRON 4 MG/1
4 TABLET, ORALLY DISINTEGRATING ORAL 3 TIMES DAILY PRN
Qty: 21 TABLET | Refills: 0 | Status: SHIPPED | OUTPATIENT
Start: 2023-07-19

## 2023-07-19 RX ORDER — LEVONORGESTREL / ETHINYL ESTRADIOL AND ETHINYL ESTRADIOL 150-30(84)
1 KIT ORAL DAILY
COMMUNITY
Start: 2022-12-14

## 2023-07-19 RX ORDER — KETOROLAC TROMETHAMINE 30 MG/ML
30 INJECTION, SOLUTION INTRAMUSCULAR; INTRAVENOUS ONCE
Status: COMPLETED | OUTPATIENT
Start: 2023-07-19 | End: 2023-07-19

## 2023-07-19 RX ORDER — 0.9 % SODIUM CHLORIDE 0.9 %
1000 INTRAVENOUS SOLUTION INTRAVENOUS ONCE
Status: COMPLETED | OUTPATIENT
Start: 2023-07-19 | End: 2023-07-19

## 2023-07-19 RX ORDER — PROMETHAZINE HYDROCHLORIDE 25 MG/1
25 TABLET ORAL 4 TIMES DAILY PRN
Qty: 20 TABLET | Refills: 0 | Status: SHIPPED | OUTPATIENT
Start: 2023-07-19 | End: 2023-07-26

## 2023-07-19 RX ORDER — PROMETHAZINE HYDROCHLORIDE 25 MG/1
25 TABLET ORAL ONCE
Status: COMPLETED | OUTPATIENT
Start: 2023-07-19 | End: 2023-07-19

## 2023-07-19 RX ORDER — ONDANSETRON 2 MG/ML
4 INJECTION INTRAMUSCULAR; INTRAVENOUS
Status: DISCONTINUED | OUTPATIENT
Start: 2023-07-19 | End: 2023-07-19 | Stop reason: HOSPADM

## 2023-07-19 RX ADMIN — PROMETHAZINE HYDROCHLORIDE 25 MG: 25 TABLET ORAL at 13:08

## 2023-07-19 RX ADMIN — ONDANSETRON 4 MG: 2 INJECTION INTRAMUSCULAR; INTRAVENOUS at 11:13

## 2023-07-19 RX ADMIN — SODIUM CHLORIDE 1000 ML: 9 INJECTION, SOLUTION INTRAVENOUS at 11:13

## 2023-07-19 RX ADMIN — KETOROLAC TROMETHAMINE 30 MG: 30 INJECTION, SOLUTION INTRAMUSCULAR; INTRAVENOUS at 11:14

## 2023-07-19 ASSESSMENT — PAIN SCALES - GENERAL
PAINLEVEL_OUTOF10: 2
PAINLEVEL_OUTOF10: 6
PAINLEVEL_OUTOF10: 8

## 2023-07-19 ASSESSMENT — PAIN DESCRIPTION - DESCRIPTORS: DESCRIPTORS: CRAMPING

## 2023-07-19 ASSESSMENT — PAIN - FUNCTIONAL ASSESSMENT
PAIN_FUNCTIONAL_ASSESSMENT: 0-10
PAIN_FUNCTIONAL_ASSESSMENT: 0-10

## 2023-07-19 ASSESSMENT — PAIN DESCRIPTION - LOCATION
LOCATION: ABDOMEN;FLANK
LOCATION: ABDOMEN;FLANK
LOCATION: ABDOMEN

## 2023-07-19 ASSESSMENT — PAIN DESCRIPTION - ORIENTATION
ORIENTATION: RIGHT;LEFT
ORIENTATION: RIGHT;LEFT;LOWER

## 2023-07-19 NOTE — ED NOTES
Writer reviewed discharge instructions, medications, and follow-up with PCP; patient verbalized understanding. Patient's IV removed with no complications with dressing in place. Patient stable, ambulatory with steady gait, GCS 15, no signs/ symptoms of acute distress, respirations unlabored, and with friend. Patient discharged with belongings.       Puja Mehta RN  07/19/23 1612

## 2023-07-19 NOTE — ED PROVIDER NOTES
[address] [place] [address]  ED      CHIEF COMPLAINT  Flank Pain (Pt reporting chronic back pain and emesis. Reports she goes through periods of time where she has these episodes. Reports originally they thought it was her periods so she was placed on birth control then she reports they thought it was her anxiety. So she was placed on Lexapro 10mg. Pt states most recent episode started yesterday. States she can't keep anything down. Has c/o bilateral flank/back pain and abdominal cramping bilaterally ) and Abdominal Pain       HISTORY OF PRESENT ILLNESS  Fang Yanes is a 26 y.o. female  who presents to the ED complaining of concern for recurrent episodes of nausea and vomiting. Patient states that she had her birth control changed as well as her anxiety medication changed as it was thought to be related to her periods as well as possible anxiety. It seemed to help for several months but now she has had a recurrent episode here today. Patient denies any known fevers. No dysuria or hematuria. She did just start her menstrual cycle and has been having some spotting today. She has had multiple episodes of nonbloody nonbilious emesis over the past 2 days and has been unable to keep anything down. She tried to keep some Jell-O down but then vomited again so came back to the emergency department for further evaluation. She states that Zofran ODT has not been helping. She does state that typically IV Zofran seems to help better. She has a history of type 1 diabetes. She states that her blood sugars though have been well controlled through this. She has been having some abdominal cramping discomfort. No daily alcohol use. She does state that she previously smoked marijuana but has not smoked marijuana in several months. No other complaints, modifying factors or associated symptoms. I have reviewed the following from the nursing documentation.     Past Medical History:   Diagnosis Date    Abdominal pain Magnesium 4.5 3.5 - 5.1 mmol/L    Chloride 100 99 - 110 mmol/L    CO2 14 (LL) 21 - 32 mmol/L    Anion Gap 23 (H) 3 - 16    Glucose 252 (H) 70 - 99 mg/dL    BUN 18 7 - 20 mg/dL    Creatinine 0.8 0.6 - 1.1 mg/dL    Est, Glom Filt Rate >60 >60    Calcium 9.9 8.3 - 10.6 mg/dL    Total Protein 8.1 6.4 - 8.2 g/dL    Albumin 4.7 3.4 - 5.0 g/dL    Albumin/Globulin Ratio 1.4 1.1 - 2.2    Total Bilirubin 0.5 0.0 - 1.0 mg/dL    Alkaline Phosphatase 82 40 - 129 U/L    ALT 17 10 - 40 U/L    AST 20 15 - 37 U/L   Lipase   Result Value Ref Range    Lipase 6.0 (L) 13.0 - 60.0 U/L   Urinalysis with Reflex to Culture    Specimen: Urine   Result Value Ref Range    Color, UA Yellow Straw/Yellow    Clarity, UA Clear Clear    Glucose, Ur 500 (A) Negative mg/dL    Bilirubin Urine SMALL (A) Negative    Ketones, Urine 40 (A) Negative mg/dL    Specific Gravity, UA >=1.030 1.005 - 1.030    Blood, Urine LARGE (A) Negative    pH, UA 6.0 5.0 - 8.0    Protein, UA >=300 (A) Negative mg/dL    Urobilinogen, Urine 0.2 <2.0 E.U./dL    Nitrite, Urine Negative Negative    Leukocyte Esterase, Urine Negative Negative    Microscopic Examination YES     Urine Type NotGiven     Urine Reflex to Culture Not Indicated    HCG Qualitative, Serum   Result Value Ref Range    hCG Qual Negative Detects HCG level >10 MIU/mL   Microscopic Urinalysis   Result Value Ref Range    Hyaline Casts, UA 11-20 (A) 0 - 2 /LPF    Mucus, UA 2+ (A) None Seen /LPF    WBC, UA 3-5 0 - 5 /HPF    RBC, UA 3-4 0 - 4 /HPF    Epithelial Cells, UA 6-10 (A) 0 - 5 /HPF    Bacteria, UA Rare (A) None Seen /HPF       RADIOLOGY  None    Point of care ultrasound  No results found. ED COURSE/MDM  Patient presenting with recurrence of nausea and vomiting and has epigastric discomfort but no lower abdominal pain. Her glucose is 252. Pregnancy negative. She does have a leukocytosis which may be reactive in addition to the thrombocytosis.   No evidence of additional ongoing infection and a low

## 2023-08-20 ENCOUNTER — HOSPITAL ENCOUNTER (EMERGENCY)
Age: 23
Discharge: HOME | End: 2023-08-20
Payer: COMMERCIAL

## 2023-08-20 VITALS
BODY MASS INDEX: 26.68 KG/M2 | WEIGHT: 145 LBS | DIASTOLIC BLOOD PRESSURE: 75 MMHG | TEMPERATURE: 97.8 F | RESPIRATION RATE: 18 BRPM | SYSTOLIC BLOOD PRESSURE: 116 MMHG | HEIGHT: 62 IN | OXYGEN SATURATION: 95 % | HEART RATE: 73 BPM

## 2023-08-20 DIAGNOSIS — R10.9 ABDOMINAL PAIN, UNSPECIFIED ABDOMINAL LOCATION: ICD-10-CM

## 2023-08-20 DIAGNOSIS — R11.2 NAUSEA AND VOMITING, UNSPECIFIED VOMITING TYPE: Primary | ICD-10-CM

## 2023-08-20 DIAGNOSIS — E86.0 DEHYDRATION: ICD-10-CM

## 2023-08-20 LAB
ALBUMIN SERPL-MCNC: 4.1 G/DL (ref 3.4–5)
ALBUMIN/GLOB SERPL: 1.6 {RATIO} (ref 1.1–2.2)
ALP SERPL-CCNC: 54 U/L (ref 40–129)
ALT SERPL-CCNC: 15 U/L (ref 10–40)
ANION GAP SERPL CALCULATED.3IONS-SCNC: 17 MMOL/L (ref 3–16)
AST SERPL-CCNC: 15 U/L (ref 15–37)
B-HCG SERPL EIA 3RD IS-ACNC: <5 MIU/ML
BASE EXCESS BLDV CALC-SCNC: -1.8 MMOL/L (ref -3–3)
BASOPHILS # BLD: 0.1 K/UL (ref 0–0.2)
BASOPHILS NFR BLD: 0.7 %
BILIRUB SERPL-MCNC: 0.7 MG/DL (ref 0–1)
BILIRUB UR QL STRIP.AUTO: NEGATIVE
BUN SERPL-MCNC: 4 MG/DL (ref 7–20)
CALCIUM SERPL-MCNC: 8.5 MG/DL (ref 8.3–10.6)
CHLORIDE SERPL-SCNC: 100 MMOL/L (ref 99–110)
CLARITY UR: CLEAR
CO2 BLDV-SCNC: 21 MMOL/L
CO2 SERPL-SCNC: 18 MMOL/L (ref 21–32)
COHGB MFR BLDV: 0.9 % (ref 0–1.5)
COLOR UR: YELLOW
CREAT SERPL-MCNC: 0.6 MG/DL (ref 0.6–1.1)
DEPRECATED RDW RBC AUTO: 13.1 % (ref 12.4–15.4)
EOSINOPHIL # BLD: 0 K/UL (ref 0–0.6)
EOSINOPHIL NFR BLD: 0.1 %
GFR SERPLBLD CREATININE-BSD FMLA CKD-EPI: >60 ML/MIN/{1.73_M2}
GLUCOSE SERPL-MCNC: 150 MG/DL (ref 70–99)
GLUCOSE UR STRIP.AUTO-MCNC: 250 MG/DL
HCO3 BLDV-SCNC: 19.8 MMOL/L (ref 23–29)
HCT VFR BLD AUTO: 40.2 % (ref 36–48)
HGB BLD-MCNC: 13.7 G/DL (ref 12–16)
HGB UR QL STRIP.AUTO: NEGATIVE
KETONES UR STRIP.AUTO-MCNC: >=80 MG/DL
LEUKOCYTE ESTERASE UR QL STRIP.AUTO: NEGATIVE
LIPASE SERPL-CCNC: 19 U/L (ref 13–60)
LYMPHOCYTES # BLD: 1.8 K/UL (ref 1–5.1)
LYMPHOCYTES NFR BLD: 17 %
MCH RBC QN AUTO: 30 PG (ref 26–34)
MCHC RBC AUTO-ENTMCNC: 34.1 G/DL (ref 31–36)
MCV RBC AUTO: 88.2 FL (ref 80–100)
METHGB MFR BLDV: 0.4 %
MONOCYTES # BLD: 0.7 K/UL (ref 0–1.3)
MONOCYTES NFR BLD: 6.7 %
NEUTROPHILS # BLD: 8.1 K/UL (ref 1.7–7.7)
NEUTROPHILS NFR BLD: 75.5 %
NITRITE UR QL STRIP.AUTO: NEGATIVE
O2 THERAPY: ABNORMAL
PCO2 BLDV: 26.2 MMHG (ref 40–50)
PH BLDV: 7.5 [PH] (ref 7.35–7.45)
PH UR STRIP.AUTO: 7 [PH] (ref 5–8)
PLATELET # BLD AUTO: 381 K/UL (ref 135–450)
PMV BLD AUTO: 8.5 FL (ref 5–10.5)
PO2 BLDV: 143.8 MMHG (ref 25–40)
POTASSIUM SERPL-SCNC: 3.3 MMOL/L (ref 3.5–5.1)
PROT SERPL-MCNC: 6.6 G/DL (ref 6.4–8.2)
PROT UR STRIP.AUTO-MCNC: NEGATIVE MG/DL
RBC # BLD AUTO: 4.55 M/UL (ref 4–5.2)
SAO2 % BLDV: 99 %
SODIUM SERPL-SCNC: 135 MMOL/L (ref 136–145)
SP GR UR STRIP.AUTO: 1.02 (ref 1–1.03)
SPECIMEN STATUS: NORMAL
UA DIPSTICK W REFLEX MICRO PNL UR: ABNORMAL
URN SPEC COLLECT METH UR: ABNORMAL
UROBILINOGEN UR STRIP-ACNC: 0.2 E.U./DL
WBC # BLD AUTO: 10.7 K/UL (ref 4–11)

## 2023-08-20 PROCEDURE — 80053 COMPREHEN METABOLIC PANEL: CPT

## 2023-08-20 PROCEDURE — 82803 BLOOD GASES ANY COMBINATION: CPT

## 2023-08-20 PROCEDURE — 83690 ASSAY OF LIPASE: CPT

## 2023-08-20 PROCEDURE — 81003 URINALYSIS AUTO W/O SCOPE: CPT

## 2023-08-20 PROCEDURE — 36415 COLL VENOUS BLD VENIPUNCTURE: CPT

## 2023-08-20 PROCEDURE — 84702 CHORIONIC GONADOTROPIN TEST: CPT

## 2023-08-20 PROCEDURE — 96374 THER/PROPH/DIAG INJ IV PUSH: CPT

## 2023-08-20 PROCEDURE — 96361 HYDRATE IV INFUSION ADD-ON: CPT

## 2023-08-20 PROCEDURE — 96375 TX/PRO/DX INJ NEW DRUG ADDON: CPT

## 2023-08-20 PROCEDURE — 85025 COMPLETE CBC W/AUTO DIFF WBC: CPT

## 2023-08-20 PROCEDURE — 2580000003 HC RX 258: Performed by: PHYSICIAN ASSISTANT

## 2023-08-20 PROCEDURE — 6360000002 HC RX W HCPCS: Performed by: PHYSICIAN ASSISTANT

## 2023-08-20 PROCEDURE — 99284 EMERGENCY DEPT VISIT MOD MDM: CPT

## 2023-08-20 RX ORDER — DROPERIDOL 2.5 MG/ML
1.25 INJECTION, SOLUTION INTRAMUSCULAR; INTRAVENOUS EVERY 6 HOURS PRN
Status: DISCONTINUED | OUTPATIENT
Start: 2023-08-20 | End: 2023-08-20 | Stop reason: HOSPADM

## 2023-08-20 RX ORDER — 0.9 % SODIUM CHLORIDE 0.9 %
1000 INTRAVENOUS SOLUTION INTRAVENOUS ONCE
Status: COMPLETED | OUTPATIENT
Start: 2023-08-20 | End: 2023-08-20

## 2023-08-20 RX ORDER — ONDANSETRON 2 MG/ML
4 INJECTION INTRAMUSCULAR; INTRAVENOUS ONCE
Status: COMPLETED | OUTPATIENT
Start: 2023-08-20 | End: 2023-08-20

## 2023-08-20 RX ORDER — MORPHINE SULFATE 4 MG/ML
4 INJECTION, SOLUTION INTRAMUSCULAR; INTRAVENOUS ONCE
Status: COMPLETED | OUTPATIENT
Start: 2023-08-20 | End: 2023-08-20

## 2023-08-20 RX ORDER — DIPHENHYDRAMINE HYDROCHLORIDE 50 MG/ML
25 INJECTION INTRAMUSCULAR; INTRAVENOUS ONCE
Status: COMPLETED | OUTPATIENT
Start: 2023-08-20 | End: 2023-08-20

## 2023-08-20 RX ORDER — PROMETHAZINE HYDROCHLORIDE 25 MG/1
25 SUPPOSITORY RECTAL EVERY 6 HOURS PRN
Qty: 20 SUPPOSITORY | Refills: 0 | Status: SHIPPED | OUTPATIENT
Start: 2023-08-20 | End: 2023-08-27

## 2023-08-20 RX ORDER — DICYCLOMINE HYDROCHLORIDE 10 MG/1
10 CAPSULE ORAL 4 TIMES DAILY
Qty: 360 CAPSULE | Refills: 1 | Status: SHIPPED | OUTPATIENT
Start: 2023-08-20

## 2023-08-20 RX ORDER — KETOROLAC TROMETHAMINE 30 MG/ML
30 INJECTION, SOLUTION INTRAMUSCULAR; INTRAVENOUS ONCE
Status: COMPLETED | OUTPATIENT
Start: 2023-08-20 | End: 2023-08-20

## 2023-08-20 RX ORDER — METOCLOPRAMIDE 10 MG/1
10 TABLET ORAL
Qty: 120 TABLET | Refills: 3 | Status: SHIPPED | OUTPATIENT
Start: 2023-08-20

## 2023-08-20 RX ORDER — METOCLOPRAMIDE HYDROCHLORIDE 5 MG/ML
10 INJECTION INTRAMUSCULAR; INTRAVENOUS ONCE
Status: COMPLETED | OUTPATIENT
Start: 2023-08-20 | End: 2023-08-20

## 2023-08-20 RX ADMIN — DIPHENHYDRAMINE HYDROCHLORIDE 25 MG: 50 INJECTION INTRAMUSCULAR; INTRAVENOUS at 17:20

## 2023-08-20 RX ADMIN — MORPHINE SULFATE 4 MG: 4 INJECTION, SOLUTION INTRAMUSCULAR; INTRAVENOUS at 17:22

## 2023-08-20 RX ADMIN — KETOROLAC TROMETHAMINE 30 MG: 30 INJECTION, SOLUTION INTRAMUSCULAR at 16:09

## 2023-08-20 RX ADMIN — SODIUM CHLORIDE 1000 ML: 9 INJECTION, SOLUTION INTRAVENOUS at 14:52

## 2023-08-20 RX ADMIN — ONDANSETRON 4 MG: 2 INJECTION INTRAMUSCULAR; INTRAVENOUS at 16:08

## 2023-08-20 RX ADMIN — METOCLOPRAMIDE 10 MG: 5 INJECTION, SOLUTION INTRAMUSCULAR; INTRAVENOUS at 17:26

## 2023-08-20 RX ADMIN — DROPERIDOL 1.25 MG: 2.5 INJECTION, SOLUTION INTRAMUSCULAR; INTRAVENOUS at 14:53

## 2023-08-20 ASSESSMENT — PAIN - FUNCTIONAL ASSESSMENT
PAIN_FUNCTIONAL_ASSESSMENT: 0-10

## 2023-08-20 ASSESSMENT — PAIN DESCRIPTION - LOCATION
LOCATION: ABDOMEN

## 2023-08-20 ASSESSMENT — PAIN DESCRIPTION - DESCRIPTORS
DESCRIPTORS: STABBING
DESCRIPTORS: CRAMPING;SHARP;STABBING

## 2023-08-20 ASSESSMENT — LIFESTYLE VARIABLES
HOW OFTEN DO YOU HAVE A DRINK CONTAINING ALCOHOL: MONTHLY OR LESS
HOW MANY STANDARD DRINKS CONTAINING ALCOHOL DO YOU HAVE ON A TYPICAL DAY: 1 OR 2

## 2023-08-20 ASSESSMENT — PAIN SCALES - GENERAL
PAINLEVEL_OUTOF10: 8
PAINLEVEL_OUTOF10: 2
PAINLEVEL_OUTOF10: 6
PAINLEVEL_OUTOF10: 8

## 2023-08-20 ASSESSMENT — PAIN DESCRIPTION - ORIENTATION
ORIENTATION: LOWER

## 2023-08-20 NOTE — ED NOTES
Pt discharged with discharge paperwork. Pt verbalized understanding of education. Pt informed to return to ED for worsening symptoms. Pt ambulated out to lobby with family with no signs of distress. No additional needs or concerns at this time.      Efraín Lloyd RN  08/20/23 [id number]

## 2023-08-20 NOTE — ED PROVIDER NOTES
[address] [place] [address]  ED  Emergency Department Encounter    Patient Name: Fang Yanes  MRN: [id number]  [facility] 2000  Date of Evaluation: 8/20/2023  Provider: Gustabo Schmidt MD  Note Started: 2:57 PM EDT 8/20/23    CHIEF COMPLAINT  Abdominal Pain (Pt to ED for c/p severe lower abdominal pain that's been going on for a year. Pt states it has recently gotten worse. Pt is also vomiting. Pt states she is a type 1 diabetic. ) and Emesis    SHARED SERVICE VISIT  Evaluated by NICHO. My supervising physician was available for consultation. HISTORY OF PRESENT ILLNESS  Fang Yanes is a 26 y.o. female who presents to the ED for evaluation of several day history of lower abdominal pain. Patient accompanied by her sister today for evaluation. She has history abdominal discomfort with multiple evaluations without true cause of symptoms. Reports that she was using cannabis products although has not done so in 1 to 2 months. Her abdominal pain appeared to coincide with menses. Attempted switching contraceptives although that does not appear to be resolving issue. She does have history of type 1 diabetes. Abdominal discomfort described as cramping aching. States that she has had nausea with vomiting as well. No diarrhea or constipation. No black or bloody stools. She denies fevers chills. No headaches, lightheadedness or dizziness. Patient denies any urinary symptoms. Last normal menstrual cycle was 7/20. No other complaints, modifying factors or associated symptoms. Nursing notes reviewed were all reviewed and agreed with or any disagreements were addressed in the HPI. PMH:  Past Medical History:   Diagnosis Date    Abdominal pain     ADHD     Back pain     Diabetes mellitus ([address])      Surgical History:  Past Surgical History:   Procedure Laterality Date    ESOPHAGOGASTRODUODENOSCOPY      TONSILLECTOMY       Family History:  History reviewed. No pertinent family history.   Social

## 2024-07-24 ENCOUNTER — HOSPITAL ENCOUNTER (EMERGENCY)
Age: 24
Discharge: HOME | End: 2024-07-24
Attending: EMERGENCY MEDICINE
Payer: COMMERCIAL

## 2024-07-24 ENCOUNTER — APPOINTMENT (OUTPATIENT)
Dept: CT IMAGING | Age: 24
End: 2024-07-24
Payer: COMMERCIAL

## 2024-07-24 VITALS
BODY MASS INDEX: 30.18 KG/M2 | OXYGEN SATURATION: 100 % | DIASTOLIC BLOOD PRESSURE: 51 MMHG | WEIGHT: 165 LBS | HEART RATE: 90 BPM | SYSTOLIC BLOOD PRESSURE: 111 MMHG | TEMPERATURE: 98 F | RESPIRATION RATE: 16 BRPM

## 2024-07-24 DIAGNOSIS — R11.2 NAUSEA AND VOMITING, UNSPECIFIED VOMITING TYPE: Primary | ICD-10-CM

## 2024-07-24 LAB
ALBUMIN SERPL-MCNC: 4.4 G/DL (ref 3.4–5)
ALBUMIN/GLOB SERPL: 1.1 {RATIO} (ref 1.1–2.2)
ALP SERPL-CCNC: 89 U/L (ref 40–129)
ALT SERPL-CCNC: 31 U/L (ref 10–40)
AMPHETAMINES UR QL SCN>1000 NG/ML: ABNORMAL
ANION GAP SERPL CALCULATED.3IONS-SCNC: 27 MMOL/L (ref 3–16)
AST SERPL-CCNC: 34 U/L (ref 15–37)
BARBITURATES UR QL SCN>200 NG/ML: ABNORMAL
BASE EXCESS BLDV CALC-SCNC: -9.5 MMOL/L (ref -3–3)
BASE EXCESS BLDV CALC-SCNC: -9.8 MMOL/L (ref -3–3)
BASOPHILS # BLD: 0.1 K/UL (ref 0–0.2)
BASOPHILS NFR BLD: 0.5 %
BENZODIAZ UR QL SCN>200 NG/ML: ABNORMAL
BILIRUB SERPL-MCNC: 1 MG/DL (ref 0–1)
BILIRUB UR QL STRIP.AUTO: NEGATIVE
BUN SERPL-MCNC: 15 MG/DL (ref 7–20)
CALCIUM SERPL-MCNC: 9.5 MG/DL (ref 8.3–10.6)
CANNABINOIDS UR QL SCN>50 NG/ML: POSITIVE
CHLORIDE SERPL-SCNC: 97 MMOL/L (ref 99–110)
CLARITY UR: CLEAR
CO2 BLDV-SCNC: 14 MMOL/L
CO2 BLDV-SCNC: 17 MMOL/L
CO2 SERPL-SCNC: 12 MMOL/L (ref 21–32)
COCAINE UR QL SCN: ABNORMAL
COHGB MFR BLDV: 1.4 % (ref 0–1.5)
COHGB MFR BLDV: 3.9 % (ref 0–1.5)
COLOR UR: YELLOW
CREAT SERPL-MCNC: 0.8 MG/DL (ref 0.6–1.1)
DEPRECATED RDW RBC AUTO: 14.1 % (ref 12.4–15.4)
DRUG SCREEN COMMENT UR-IMP: ABNORMAL
EOSINOPHIL # BLD: 0 K/UL (ref 0–0.6)
EOSINOPHIL NFR BLD: 0 %
FENTANYL SCREEN, URINE: ABNORMAL
GFR SERPLBLD CREATININE-BSD FMLA CKD-EPI: >90 ML/MIN/{1.73_M2}
GLUCOSE BLD-MCNC: 217 MG/DL (ref 70–99)
GLUCOSE SERPL-MCNC: 319 MG/DL (ref 70–99)
GLUCOSE UR STRIP.AUTO-MCNC: >=1000 MG/DL
HCG UR QL: NEGATIVE
HCO3 BLDV-SCNC: 13.5 MMOL/L (ref 23–29)
HCO3 BLDV-SCNC: 15.8 MMOL/L (ref 23–29)
HCT VFR BLD AUTO: 48.5 % (ref 36–48)
HGB BLD-MCNC: 15.9 G/DL (ref 12–16)
HGB UR QL STRIP.AUTO: NEGATIVE
KETONES UR STRIP.AUTO-MCNC: >=80 MG/DL
LACTATE BLDV-SCNC: 1 MMOL/L (ref 0.4–2)
LACTATE BLDV-SCNC: 3.2 MMOL/L (ref 0.4–2)
LEUKOCYTE ESTERASE UR QL STRIP.AUTO: NEGATIVE
LIPASE SERPL-CCNC: 6 U/L (ref 13–60)
LYMPHOCYTES # BLD: 0.7 K/UL (ref 1–5.1)
LYMPHOCYTES NFR BLD: 3 %
MCH RBC QN AUTO: 29.2 PG (ref 26–34)
MCHC RBC AUTO-ENTMCNC: 32.8 G/DL (ref 31–36)
MCV RBC AUTO: 88.9 FL (ref 80–100)
METHADONE UR QL SCN>300 NG/ML: ABNORMAL
METHGB MFR BLDV: 0.3 %
METHGB MFR BLDV: 0.4 %
MONOCYTES # BLD: 1.2 K/UL (ref 0–1.3)
MONOCYTES NFR BLD: 4.8 %
NEUTROPHILS # BLD: 22.9 K/UL (ref 1.7–7.7)
NEUTROPHILS NFR BLD: 91.7 %
NITRITE UR QL STRIP.AUTO: NEGATIVE
O2 THERAPY: ABNORMAL
O2 THERAPY: ABNORMAL
OPIATES UR QL SCN>300 NG/ML: ABNORMAL
OXYCODONE UR QL SCN: ABNORMAL
PCO2 BLDV: 25.2 MMHG (ref 40–50)
PCO2 BLDV: 33 MMHG (ref 40–50)
PCP UR QL SCN>25 NG/ML: ABNORMAL
PERFORMED ON: ABNORMAL
PH BLDV: 7.3 [PH] (ref 7.35–7.45)
PH BLDV: 7.35 [PH] (ref 7.35–7.45)
PH UR STRIP.AUTO: 6 [PH] (ref 5–8)
PH UR STRIP: 6 [PH]
PLATELET # BLD AUTO: 471 K/UL (ref 135–450)
PMV BLD AUTO: 8.7 FL (ref 5–10.5)
PO2 BLDV: 74.8 MMHG (ref 25–40)
PO2 BLDV: 78.1 MMHG (ref 25–40)
POTASSIUM SERPL-SCNC: 5.3 MMOL/L (ref 3.5–5.1)
PROT SERPL-MCNC: 8.4 G/DL (ref 6.4–8.2)
PROT UR STRIP.AUTO-MCNC: NEGATIVE MG/DL
RBC # BLD AUTO: 5.46 M/UL (ref 4–5.2)
SAO2 % BLDV: 95 %
SAO2 % BLDV: 95 %
SODIUM SERPL-SCNC: 136 MMOL/L (ref 136–145)
SP GR UR STRIP.AUTO: 1.02 (ref 1–1.03)
UA COMPLETE W REFLEX CULTURE PNL UR: ABNORMAL
UA DIPSTICK W REFLEX MICRO PNL UR: ABNORMAL
URN SPEC COLLECT METH UR: ABNORMAL
UROBILINOGEN UR STRIP-ACNC: 0.2 E.U./DL
WBC # BLD AUTO: 24.9 K/UL (ref 4–11)

## 2024-07-24 PROCEDURE — 6360000004 HC RX CONTRAST MEDICATION: Performed by: NURSE PRACTITIONER

## 2024-07-24 PROCEDURE — 80053 COMPREHEN METABOLIC PANEL: CPT

## 2024-07-24 PROCEDURE — 83690 ASSAY OF LIPASE: CPT

## 2024-07-24 PROCEDURE — 96375 TX/PRO/DX INJ NEW DRUG ADDON: CPT

## 2024-07-24 PROCEDURE — 82803 BLOOD GASES ANY COMBINATION: CPT

## 2024-07-24 PROCEDURE — 6360000002 HC RX W HCPCS: Performed by: NURSE PRACTITIONER

## 2024-07-24 PROCEDURE — 84703 CHORIONIC GONADOTROPIN ASSAY: CPT

## 2024-07-24 PROCEDURE — 81003 URINALYSIS AUTO W/O SCOPE: CPT

## 2024-07-24 PROCEDURE — 99285 EMERGENCY DEPT VISIT HI MDM: CPT

## 2024-07-24 PROCEDURE — 83605 ASSAY OF LACTIC ACID: CPT

## 2024-07-24 PROCEDURE — 96374 THER/PROPH/DIAG INJ IV PUSH: CPT

## 2024-07-24 PROCEDURE — 74177 CT ABD & PELVIS W/CONTRAST: CPT

## 2024-07-24 PROCEDURE — 2580000003 HC RX 258: Performed by: NURSE PRACTITIONER

## 2024-07-24 PROCEDURE — 2580000003 HC RX 258: Performed by: EMERGENCY MEDICINE

## 2024-07-24 PROCEDURE — 36415 COLL VENOUS BLD VENIPUNCTURE: CPT

## 2024-07-24 PROCEDURE — 96361 HYDRATE IV INFUSION ADD-ON: CPT

## 2024-07-24 PROCEDURE — 85025 COMPLETE CBC W/AUTO DIFF WBC: CPT

## 2024-07-24 PROCEDURE — 80307 DRUG TEST PRSMV CHEM ANLYZR: CPT

## 2024-07-24 RX ORDER — KETOROLAC TROMETHAMINE 30 MG/ML
30 INJECTION, SOLUTION INTRAMUSCULAR; INTRAVENOUS ONCE
Status: COMPLETED | OUTPATIENT
Start: 2024-07-24 | End: 2024-07-24

## 2024-07-24 RX ORDER — DROPERIDOL 2.5 MG/ML
0.62 INJECTION, SOLUTION INTRAMUSCULAR; INTRAVENOUS EVERY 6 HOURS PRN
Status: DISCONTINUED | OUTPATIENT
Start: 2024-07-24 | End: 2024-07-24 | Stop reason: HOSPADM

## 2024-07-24 RX ORDER — 0.9 % SODIUM CHLORIDE 0.9 %
2000 INTRAVENOUS SOLUTION INTRAVENOUS ONCE
Status: COMPLETED | OUTPATIENT
Start: 2024-07-24 | End: 2024-07-24

## 2024-07-24 RX ORDER — ONDANSETRON 4 MG/1
4 TABLET, FILM COATED ORAL EVERY 8 HOURS PRN
Qty: 20 TABLET | Refills: 0 | Status: SHIPPED | OUTPATIENT
Start: 2024-07-24

## 2024-07-24 RX ORDER — 0.9 % SODIUM CHLORIDE 0.9 %
1000 INTRAVENOUS SOLUTION INTRAVENOUS ONCE
Status: COMPLETED | OUTPATIENT
Start: 2024-07-24 | End: 2024-07-24

## 2024-07-24 RX ORDER — ONDANSETRON 2 MG/ML
4 INJECTION INTRAMUSCULAR; INTRAVENOUS ONCE
Status: COMPLETED | OUTPATIENT
Start: 2024-07-24 | End: 2024-07-24

## 2024-07-24 RX ADMIN — SODIUM CHLORIDE 1000 ML: 9 INJECTION, SOLUTION INTRAVENOUS at 09:16

## 2024-07-24 RX ADMIN — ONDANSETRON 4 MG: 2 INJECTION INTRAMUSCULAR; INTRAVENOUS at 09:16

## 2024-07-24 RX ADMIN — KETOROLAC TROMETHAMINE 30 MG: 30 INJECTION, SOLUTION INTRAMUSCULAR at 10:31

## 2024-07-24 RX ADMIN — DROPERIDOL 0.62 MG: 2.5 INJECTION, SOLUTION INTRAMUSCULAR; INTRAVENOUS at 11:06

## 2024-07-24 RX ADMIN — IOPAMIDOL 75 ML: 755 INJECTION, SOLUTION INTRAVENOUS at 10:17

## 2024-07-24 RX ADMIN — SODIUM CHLORIDE 2000 ML: 9 INJECTION, SOLUTION INTRAVENOUS at 10:29

## 2024-07-24 ASSESSMENT — PAIN SCALES - GENERAL
PAINLEVEL_OUTOF10: 8
PAINLEVEL_OUTOF10: 0

## 2024-07-24 ASSESSMENT — PAIN DESCRIPTION - LOCATION: LOCATION: BACK

## 2024-07-24 ASSESSMENT — PAIN - FUNCTIONAL ASSESSMENT: PAIN_FUNCTIONAL_ASSESSMENT: 0-10

## 2024-07-24 NOTE — ED NOTES
RN took report from Oralia JI, no distress noted and pt resting in bed comfortably with visitor at bedside.

## 2024-07-29 NOTE — ED PROVIDER NOTES
independently provided 43 minutes of non-concurrent critical care out of the total shared critical care time provided not including separate billable procedure. The critical condition I managed or considered was acidosis/DKA    PAST MEDICAL HISTORY      has a past medical history of Abdominal pain, ADHD, Back pain, and Diabetes mellitus (HCC).     EMERGENCY DEPARTMENT COURSE and DIFFERENTIAL DIAGNOSIS/MDM:   Vitals:    Vitals:    07/24/24 0837 07/24/24 1041 07/24/24 1239   BP: 135/88 130/78 (!) 111/51   Pulse: 77 79 90   Resp: 18 16 16   Temp: 98 °F (36.7 °C)     TempSrc: Oral     SpO2: 99% 99% 100%   Weight: 74.8 kg (165 lb)         Patient was given the following medications:  Medications   ondansetron (ZOFRAN) injection 4 mg (4 mg IntraVENous Given 7/24/24 0916)   sodium chloride 0.9 % bolus 1,000 mL (0 mLs IntraVENous Stopped 7/24/24 1252)   sodium chloride 0.9 % bolus 2,000 mL (0 mLs IntraVENous Stopped 7/24/24 1129)   iopamidol (ISOVUE-370) 76 % injection 75 mL (75 mLs IntraVENous Given 7/24/24 1017)   ketorolac (TORADOL) injection 30 mg (30 mg IntraVENous Given 7/24/24 1031)             Is this patient to be included in the SEP-1 Core Measure due to severe sepsis or septic shock?   No   Exclusion criteria - the patient is NOT to be included for SEP-1 Core Measure due to:  Infection is not suspected    Chronic Conditions affecting care:    has a past medical history of Abdominal pain, ADHD, Back pain, and Diabetes mellitus (HCC).    CONSULTS: (Who and What was discussed)  None      Social Determinants Significantly Affecting Health : None    Records Reviewed (External and Source)     CC/HPI Summary, DDx, ED Course, and Reassessment: Patient presented to the ER today with complaints of nausea and vomiting, she states that she ate some of her food and subsequently has been vomiting since about 1 in the morning.  She is diabetic.  Her laboratory studies did show some concerning findings.  CO2 was 12 gap was 27

## 2024-08-14 ENCOUNTER — HOSPITAL ENCOUNTER (EMERGENCY)
Age: 24
Discharge: HOME | End: 2024-08-14
Attending: EMERGENCY MEDICINE
Payer: COMMERCIAL

## 2024-08-14 VITALS
RESPIRATION RATE: 17 BRPM | OXYGEN SATURATION: 97 % | HEART RATE: 97 BPM | HEIGHT: 62 IN | DIASTOLIC BLOOD PRESSURE: 97 MMHG | WEIGHT: 160 LBS | TEMPERATURE: 98.6 F | SYSTOLIC BLOOD PRESSURE: 143 MMHG | BODY MASS INDEX: 29.44 KG/M2

## 2024-08-14 DIAGNOSIS — R11.2 NAUSEA AND VOMITING, UNSPECIFIED VOMITING TYPE: Primary | ICD-10-CM

## 2024-08-14 DIAGNOSIS — Z86.39 HX OF TYPE 1 DIABETES MELLITUS: ICD-10-CM

## 2024-08-14 LAB
ALBUMIN SERPL-MCNC: 4.8 G/DL (ref 3.4–5)
ALBUMIN/GLOB SERPL: 1.3 {RATIO} (ref 1.1–2.2)
ALP SERPL-CCNC: 70 U/L (ref 40–129)
ALT SERPL-CCNC: 47 U/L (ref 10–40)
AMPHETAMINES UR QL SCN>1000 NG/ML: ABNORMAL
ANION GAP SERPL CALCULATED.3IONS-SCNC: 22 MMOL/L (ref 3–16)
AST SERPL-CCNC: 34 U/L (ref 15–37)
BACTERIA URNS QL MICRO: ABNORMAL /HPF
BARBITURATES UR QL SCN>200 NG/ML: ABNORMAL
BASE EXCESS BLDV CALC-SCNC: -7.1 MMOL/L (ref -3–3)
BASOPHILS # BLD: 0.1 K/UL (ref 0–0.2)
BASOPHILS NFR BLD: 0.6 %
BENZODIAZ UR QL SCN>200 NG/ML: ABNORMAL
BILIRUB SERPL-MCNC: 0.9 MG/DL (ref 0–1)
BILIRUB UR QL STRIP.AUTO: ABNORMAL
BUN SERPL-MCNC: 16 MG/DL (ref 7–20)
CALCIUM SERPL-MCNC: 10.6 MG/DL (ref 8.3–10.6)
CANNABINOIDS UR QL SCN>50 NG/ML: POSITIVE
CHLORIDE SERPL-SCNC: 99 MMOL/L (ref 99–110)
CLARITY UR: CLEAR
CO2 BLDV-SCNC: 17 MMOL/L
CO2 SERPL-SCNC: 16 MMOL/L (ref 21–32)
COCAINE UR QL SCN: ABNORMAL
COHGB MFR BLDV: 1.3 % (ref 0–1.5)
COLOR UR: YELLOW
CREAT SERPL-MCNC: 0.8 MG/DL (ref 0.6–1.1)
DEPRECATED RDW RBC AUTO: 14.1 % (ref 12.4–15.4)
DRUG SCREEN COMMENT UR-IMP: ABNORMAL
EKG ATRIAL RATE: 74 BPM
EKG DIAGNOSIS: NORMAL
EKG P AXIS: 27 DEGREES
EKG P-R INTERVAL: 140 MS
EKG Q-T INTERVAL: 414 MS
EKG QRS DURATION: 74 MS
EKG QTC CALCULATION (BAZETT): 459 MS
EKG R AXIS: 55 DEGREES
EKG T AXIS: 66 DEGREES
EKG VENTRICULAR RATE: 74 BPM
EOSINOPHIL # BLD: 0 K/UL (ref 0–0.6)
EOSINOPHIL NFR BLD: 0 %
EPI CELLS #/AREA URNS HPF: ABNORMAL /HPF (ref 0–5)
FENTANYL SCREEN, URINE: ABNORMAL
FLUAV RNA RESP QL NAA+PROBE: NOT DETECTED
FLUBV RNA RESP QL NAA+PROBE: NOT DETECTED
GFR SERPLBLD CREATININE-BSD FMLA CKD-EPI: >90 ML/MIN/{1.73_M2}
GLUCOSE BLD-MCNC: 188 MG/DL (ref 70–99)
GLUCOSE BLD-MCNC: 194 MG/DL (ref 70–99)
GLUCOSE SERPL-MCNC: 226 MG/DL (ref 70–99)
GLUCOSE UR STRIP.AUTO-MCNC: 500 MG/DL
HCG SERPL QL: NEGATIVE
HCO3 BLDV-SCNC: 16.3 MMOL/L (ref 23–29)
HCT VFR BLD AUTO: 47.7 % (ref 36–48)
HGB BLD-MCNC: 15.8 G/DL (ref 12–16)
HGB UR QL STRIP.AUTO: NEGATIVE
KETONES UR STRIP.AUTO-MCNC: >=80 MG/DL
LEUKOCYTE ESTERASE UR QL STRIP.AUTO: NEGATIVE
LIPASE SERPL-CCNC: 7 U/L (ref 13–60)
LYMPHOCYTES # BLD: 0.8 K/UL (ref 1–5.1)
LYMPHOCYTES NFR BLD: 6.5 %
MCH RBC QN AUTO: 29 PG (ref 26–34)
MCHC RBC AUTO-ENTMCNC: 33.1 G/DL (ref 31–36)
MCV RBC AUTO: 87.8 FL (ref 80–100)
METHADONE UR QL SCN>300 NG/ML: ABNORMAL
METHGB MFR BLDV: 0.3 %
MONOCYTES # BLD: 0.2 K/UL (ref 0–1.3)
MONOCYTES NFR BLD: 1.9 %
MUCOUS THREADS #/AREA URNS LPF: ABNORMAL /LPF
NEUTROPHILS # BLD: 11.3 K/UL (ref 1.7–7.7)
NEUTROPHILS NFR BLD: 91 %
NITRITE UR QL STRIP.AUTO: NEGATIVE
O2 THERAPY: ABNORMAL
OPIATES UR QL SCN>300 NG/ML: ABNORMAL
OXYCODONE UR QL SCN: ABNORMAL
PCO2 BLDV: 28.3 MMHG (ref 40–50)
PCP UR QL SCN>25 NG/ML: ABNORMAL
PERFORMED ON: ABNORMAL
PERFORMED ON: ABNORMAL
PH BLDV: 7.38 [PH] (ref 7.35–7.45)
PH UR STRIP.AUTO: 6 [PH] (ref 5–8)
PH UR STRIP: 6 [PH]
PLATELET # BLD AUTO: 484 K/UL (ref 135–450)
PMV BLD AUTO: 8.5 FL (ref 5–10.5)
PO2 BLDV: 47.1 MMHG (ref 25–40)
POTASSIUM SERPL-SCNC: 4.5 MMOL/L (ref 3.5–5.1)
PROT SERPL-MCNC: 8.6 G/DL (ref 6.4–8.2)
PROT UR STRIP.AUTO-MCNC: 30 MG/DL
RBC # BLD AUTO: 5.43 M/UL (ref 4–5.2)
RBC #/AREA URNS HPF: ABNORMAL /HPF (ref 0–4)
SAO2 % BLDV: 84 %
SARS-COV-2 RNA RESP QL NAA+PROBE: NOT DETECTED
SODIUM SERPL-SCNC: 137 MMOL/L (ref 136–145)
SP GR UR STRIP.AUTO: >=1.03 (ref 1–1.03)
UA COMPLETE W REFLEX CULTURE PNL UR: ABNORMAL
UA DIPSTICK W REFLEX MICRO PNL UR: YES
URN SPEC COLLECT METH UR: ABNORMAL
UROBILINOGEN UR STRIP-ACNC: 0.2 E.U./DL
WBC # BLD AUTO: 12.5 K/UL (ref 4–11)
WBC #/AREA URNS HPF: ABNORMAL /HPF (ref 0–5)

## 2024-08-14 PROCEDURE — 93010 ELECTROCARDIOGRAM REPORT: CPT | Performed by: INTERNAL MEDICINE

## 2024-08-14 PROCEDURE — 81001 URINALYSIS AUTO W/SCOPE: CPT

## 2024-08-14 PROCEDURE — 6360000002 HC RX W HCPCS: Performed by: EMERGENCY MEDICINE

## 2024-08-14 PROCEDURE — 96374 THER/PROPH/DIAG INJ IV PUSH: CPT

## 2024-08-14 PROCEDURE — 96375 TX/PRO/DX INJ NEW DRUG ADDON: CPT

## 2024-08-14 PROCEDURE — 83690 ASSAY OF LIPASE: CPT

## 2024-08-14 PROCEDURE — 36415 COLL VENOUS BLD VENIPUNCTURE: CPT

## 2024-08-14 PROCEDURE — 87636 SARSCOV2 & INF A&B AMP PRB: CPT

## 2024-08-14 PROCEDURE — 84703 CHORIONIC GONADOTROPIN ASSAY: CPT

## 2024-08-14 PROCEDURE — 6360000002 HC RX W HCPCS

## 2024-08-14 PROCEDURE — 2580000003 HC RX 258

## 2024-08-14 PROCEDURE — 80053 COMPREHEN METABOLIC PANEL: CPT

## 2024-08-14 PROCEDURE — 85025 COMPLETE CBC W/AUTO DIFF WBC: CPT

## 2024-08-14 PROCEDURE — 93005 ELECTROCARDIOGRAM TRACING: CPT | Performed by: EMERGENCY MEDICINE

## 2024-08-14 PROCEDURE — 80307 DRUG TEST PRSMV CHEM ANLYZR: CPT

## 2024-08-14 PROCEDURE — 82803 BLOOD GASES ANY COMBINATION: CPT

## 2024-08-14 PROCEDURE — 99284 EMERGENCY DEPT VISIT MOD MDM: CPT

## 2024-08-14 RX ORDER — ONDANSETRON 4 MG/1
4 TABLET, FILM COATED ORAL EVERY 8 HOURS PRN
Qty: 15 TABLET | Refills: 0 | Status: SHIPPED | OUTPATIENT
Start: 2024-08-14

## 2024-08-14 RX ORDER — SODIUM CHLORIDE, SODIUM LACTATE, POTASSIUM CHLORIDE, AND CALCIUM CHLORIDE .6; .31; .03; .02 G/100ML; G/100ML; G/100ML; G/100ML
1000 INJECTION, SOLUTION INTRAVENOUS ONCE
Status: COMPLETED | OUTPATIENT
Start: 2024-08-14 | End: 2024-08-14

## 2024-08-14 RX ORDER — ONDANSETRON 2 MG/ML
4 INJECTION INTRAMUSCULAR; INTRAVENOUS ONCE
Status: COMPLETED | OUTPATIENT
Start: 2024-08-14 | End: 2024-08-14

## 2024-08-14 RX ORDER — KETOROLAC TROMETHAMINE 30 MG/ML
15 INJECTION, SOLUTION INTRAMUSCULAR; INTRAVENOUS ONCE
Status: COMPLETED | OUTPATIENT
Start: 2024-08-14 | End: 2024-08-14

## 2024-08-14 RX ORDER — PANTOPRAZOLE SODIUM 40 MG/10ML
40 INJECTION, POWDER, LYOPHILIZED, FOR SOLUTION INTRAVENOUS ONCE
Status: COMPLETED | OUTPATIENT
Start: 2024-08-14 | End: 2024-08-14

## 2024-08-14 RX ORDER — DROPERIDOL 2.5 MG/ML
1.25 INJECTION, SOLUTION INTRAMUSCULAR; INTRAVENOUS ONCE
Status: COMPLETED | OUTPATIENT
Start: 2024-08-14 | End: 2024-08-14

## 2024-08-14 RX ADMIN — SODIUM CHLORIDE, POTASSIUM CHLORIDE, SODIUM LACTATE AND CALCIUM CHLORIDE 1000 ML: 600; 310; 30; 20 INJECTION, SOLUTION INTRAVENOUS at 11:01

## 2024-08-14 RX ADMIN — SODIUM CHLORIDE, POTASSIUM CHLORIDE, SODIUM LACTATE AND CALCIUM CHLORIDE 1000 ML: 600; 310; 30; 20 INJECTION, SOLUTION INTRAVENOUS at 12:45

## 2024-08-14 RX ADMIN — DROPERIDOL 1.25 MG: 2.5 INJECTION, SOLUTION INTRAMUSCULAR; INTRAVENOUS at 11:46

## 2024-08-14 RX ADMIN — ONDANSETRON 4 MG: 2 INJECTION INTRAMUSCULAR; INTRAVENOUS at 11:02

## 2024-08-14 RX ADMIN — PANTOPRAZOLE SODIUM 40 MG: 40 INJECTION, POWDER, FOR SOLUTION INTRAVENOUS at 12:45

## 2024-08-14 RX ADMIN — KETOROLAC TROMETHAMINE 15 MG: 30 INJECTION, SOLUTION INTRAMUSCULAR at 12:45

## 2024-08-14 ASSESSMENT — PAIN DESCRIPTION - LOCATION: LOCATION: ABDOMEN

## 2024-08-14 ASSESSMENT — PAIN SCALES - GENERAL
PAINLEVEL_OUTOF10: 4
PAINLEVEL_OUTOF10: 8

## 2024-08-14 ASSESSMENT — LIFESTYLE VARIABLES
HOW OFTEN DO YOU HAVE A DRINK CONTAINING ALCOHOL: 2-4 TIMES A MONTH
HOW MANY STANDARD DRINKS CONTAINING ALCOHOL DO YOU HAVE ON A TYPICAL DAY: 1 OR 2

## 2024-08-14 ASSESSMENT — PAIN - FUNCTIONAL ASSESSMENT: PAIN_FUNCTIONAL_ASSESSMENT: 0-10

## 2024-08-14 NOTE — ED PROVIDER NOTES
[facility]  ED     EMERGENCY DEPARTMENT ENCOUNTER     Location: [facility]  ED  8/14/2024  Note Started: 1:54 PM EDT 8/14/24      Patient Identification  Rossy Olvera is a 24 y.o. female      HPI:Rossy Olvera was evaluated in the Emergency Department for nausea and vomiting.  Patient has noted history of similar though she states that she previously had been using marijuana and currently is not.  Patient reports symptoms starting yesterday.  No fevers, chills, or sweats.  Abdominal discomfort rated as moderate.  No evidence of hematemesis. Although initial history and physical exam information was obtained by NICHO/NPP/MD/DO (who also dictated a record of this visit), I personally saw the patient and performed and made/approved the management plan and take responsibility for the patient management.      PHYSICAL EXAM:  General: No acute distress.  Head: Normal cephalic/atraumatic.  Heart: Regular rate rhythm.  Normal S1-S2.  Lungs: Clear to auscultation bilaterally.  Wheezes, rales, rhonchi.  Abdo: Soft.  Mild diffuse upper abdominal tenderness.  No rebound or guarding.  Active vomiting on arrival.    EKG Interpretation  Normal sinus rhythm with a sinus arrhythmia.  Rate of 74.  Normal axis.  Normal intervals and durations.  This has replaced sinus bradycardia with sinus arrhythmia from previous EKG on 1/28/2023.    Patient seen and evaluated.  Relevant records reviewed.  MDM    Patient presents to the emergency department with recurrent nausea and vomiting.  Noted history of marijuana use the patient states she has stopped for the last month or so.    I independently interpreted the following studies:   EKG as noted.  Patient has white count of 12.5 with hemoglobin of 15.8.  Normal electrolytes with anion gap of 22.  Renal function is stable as is hepatic function.  No evidence of pancreatitis.  VBG is stable with pH of 7.377.    CLINICAL IMPRESSION  1. Nausea and vomiting, 
IntraVENous Given 8/14/24 1146)   lactated ringers bolus 1,000 mL (0 mLs IntraVENous Stopped 8/14/24 1406)   ketorolac (TORADOL) injection 15 mg (15 mg IntraVENous Given 8/14/24 1245)   pantoprazole (PROTONIX) injection 40 mg (40 mg IntraVENous Given 8/14/24 1245)       Chronic conditions affecting care:    has a past medical history of Abdominal pain, ADHD, Back pain, and Diabetes mellitus (HCC).      Reassessment:   ED Course as of 08/14/24 1413   Wed Aug 14, 2024   1048 Patient seen and evaluated in mild acute distress.  Patient's vital signs reviewed and blood pressure noted to be elevated.  Patient's physical exam reveals active vomiting without any abdominal tenderness to palpation.  Will order labs to further evaluate. [JM]   1331 Patient reevaluated.  Patient continues to remain hemodynamically stable here in the ED.  Patient states that her nausea and vomiting are gone.  Patient does admit to vague epigastric abdominal discomfort however serial abdominal exam performed and patient without any tenderness.  Patient's UDS reveals cannabinoid.  Suspect patient's nausea and vomiting are related to cannabinoid hyperemesis.  Patient's labs do reveal slightly elevated anion gap with ketones in the urine and CO2 of 16.  Patient's VBG shows normal pH of 7.377.  No DKA at this time.  Patient's COVID and flu testing are negative.  Patient's urinalysis is negative for UTI.  Patient's lipase is 7.  Patient's initial glucose was 194 and repeat glucose is currently 188.  Patient is stable for discharge to follow-up outpatient with PCP.  Patient advised to use Zofran as needed for nausea and vomiting.  Patient advised to return to the ED for any new or worsening symptoms.  Patient understands and agrees with plan for discharge.  All questions answered. [JM]      ED Course User Index  [JM] Eren Hernandez, MK       Is this patient to be included in the SEP-1 Core Measure due to severe sepsis or septic shock?   No

## 2025-07-09 ENCOUNTER — HOSPITAL ENCOUNTER (EMERGENCY)
Age: 25
Discharge: HOME | DRG: 074 | End: 2025-07-09
Attending: EMERGENCY MEDICINE
Payer: COMMERCIAL

## 2025-07-09 ENCOUNTER — APPOINTMENT (OUTPATIENT)
Dept: GENERAL RADIOLOGY | Age: 25
DRG: 074 | End: 2025-07-09
Payer: COMMERCIAL

## 2025-07-09 VITALS
HEART RATE: 71 BPM | WEIGHT: 170.4 LBS | BODY MASS INDEX: 31.36 KG/M2 | OXYGEN SATURATION: 95 % | HEIGHT: 62 IN | DIASTOLIC BLOOD PRESSURE: 54 MMHG | RESPIRATION RATE: 20 BRPM | SYSTOLIC BLOOD PRESSURE: 104 MMHG | TEMPERATURE: 98.1 F

## 2025-07-09 DIAGNOSIS — F12.99: ICD-10-CM

## 2025-07-09 DIAGNOSIS — R11.2 NAUSEA AND VOMITING, UNSPECIFIED VOMITING TYPE: Primary | ICD-10-CM

## 2025-07-09 DIAGNOSIS — R82.4 KETONURIA: ICD-10-CM

## 2025-07-09 DIAGNOSIS — E86.0 DEHYDRATION: ICD-10-CM

## 2025-07-09 LAB
ALBUMIN SERPL-MCNC: 4.9 G/DL (ref 3.4–5)
ALBUMIN/GLOB SERPL: 1.7 {RATIO} (ref 1.1–2.2)
ALP SERPL-CCNC: 75 U/L (ref 40–129)
ALT SERPL-CCNC: 24 U/L (ref 10–40)
ANION GAP SERPL CALCULATED.3IONS-SCNC: 14 MMOL/L (ref 3–16)
ANION GAP SERPL CALCULATED.3IONS-SCNC: 22 MMOL/L (ref 3–16)
AST SERPL-CCNC: 22 U/L (ref 15–37)
BASE EXCESS BLDV CALC-SCNC: -5 MMOL/L (ref -3–3)
BASOPHILS # BLD: 0 K/UL (ref 0–0.2)
BASOPHILS NFR BLD: 0.3 %
BILIRUB SERPL-MCNC: 0.8 MG/DL (ref 0–1)
BILIRUB UR QL STRIP.AUTO: NEGATIVE
BUN SERPL-MCNC: 13 MG/DL (ref 7–20)
BUN SERPL-MCNC: 13 MG/DL (ref 7–20)
CALCIUM SERPL-MCNC: 10.1 MG/DL (ref 8.3–10.6)
CALCIUM SERPL-MCNC: 8.5 MG/DL (ref 8.3–10.6)
CHLORIDE SERPL-SCNC: 101 MMOL/L (ref 99–110)
CHLORIDE SERPL-SCNC: 111 MMOL/L (ref 99–110)
CLARITY UR: CLEAR
CO2 BLDV-SCNC: 18 MMOL/L
CO2 SERPL-SCNC: 16 MMOL/L (ref 21–32)
CO2 SERPL-SCNC: 16 MMOL/L (ref 21–32)
COHGB MFR BLDV: 1.8 % (ref 0–1.5)
COLOR UR: YELLOW
CREAT SERPL-MCNC: 0.8 MG/DL (ref 0.6–1.1)
CREAT SERPL-MCNC: 0.9 MG/DL (ref 0.6–1.1)
DEPRECATED RDW RBC AUTO: 13.6 % (ref 12.4–15.4)
EOSINOPHIL # BLD: 0 K/UL (ref 0–0.6)
EOSINOPHIL NFR BLD: 0 %
EPI CELLS #/AREA URNS HPF: NORMAL /HPF (ref 0–5)
GFR SERPLBLD CREATININE-BSD FMLA CKD-EPI: >90 ML/MIN/{1.73_M2}
GFR SERPLBLD CREATININE-BSD FMLA CKD-EPI: >90 ML/MIN/{1.73_M2}
GLUCOSE BLD-MCNC: 237 MG/DL (ref 70–99)
GLUCOSE SERPL-MCNC: 163 MG/DL (ref 70–99)
GLUCOSE SERPL-MCNC: 257 MG/DL (ref 70–99)
GLUCOSE UR STRIP.AUTO-MCNC: 500 MG/DL
HCG UR QL: NEGATIVE
HCO3 BLDV-SCNC: 17 MMOL/L (ref 23–29)
HCT VFR BLD AUTO: 42.7 % (ref 36–48)
HGB BLD-MCNC: 14.4 G/DL (ref 12–16)
HGB UR QL STRIP.AUTO: NEGATIVE
KETONES UR STRIP.AUTO-MCNC: >=80 MG/DL
LEUKOCYTE ESTERASE UR QL STRIP.AUTO: NEGATIVE
LIPASE SERPL-CCNC: 8 U/L (ref 13–60)
LYMPHOCYTES # BLD: 0.5 K/UL (ref 1–5.1)
LYMPHOCYTES NFR BLD: 3.7 %
MCH RBC QN AUTO: 29.8 PG (ref 26–34)
MCHC RBC AUTO-ENTMCNC: 33.8 G/DL (ref 31–36)
MCV RBC AUTO: 88.1 FL (ref 80–100)
METHGB MFR BLDV: 0.2 %
MONOCYTES # BLD: 0.1 K/UL (ref 0–1.3)
MONOCYTES NFR BLD: 0.8 %
NEUTROPHILS # BLD: 13.2 K/UL (ref 1.7–7.7)
NEUTROPHILS NFR BLD: 95.2 %
NITRITE UR QL STRIP.AUTO: NEGATIVE
O2 THERAPY: ABNORMAL
PCO2 BLDV: 25.4 MMHG (ref 40–50)
PERFORMED ON: ABNORMAL
PH BLDV: 7.44 [PH] (ref 7.35–7.45)
PH UR STRIP.AUTO: 6 [PH] (ref 5–8)
PLATELET # BLD AUTO: 371 K/UL (ref 135–450)
PMV BLD AUTO: 8.8 FL (ref 5–10.5)
PO2 BLDV: 31.1 MMHG (ref 25–40)
POTASSIUM SERPL-SCNC: 3.8 MMOL/L (ref 3.5–5.1)
POTASSIUM SERPL-SCNC: 3.9 MMOL/L (ref 3.5–5.1)
PROT SERPL-MCNC: 7.8 G/DL (ref 6.4–8.2)
PROT UR STRIP.AUTO-MCNC: 30 MG/DL
RBC # BLD AUTO: 4.84 M/UL (ref 4–5.2)
RBC #/AREA URNS HPF: NORMAL /HPF (ref 0–4)
REASON FOR REJECTION: NORMAL
REJECTED TEST: NORMAL
SAO2 % BLDV: 64 %
SODIUM SERPL-SCNC: 139 MMOL/L (ref 136–145)
SODIUM SERPL-SCNC: 141 MMOL/L (ref 136–145)
SP GR UR STRIP.AUTO: 1.02 (ref 1–1.03)
UA COMPLETE W REFLEX CULTURE PNL UR: ABNORMAL
UA DIPSTICK W REFLEX MICRO PNL UR: YES
URN SPEC COLLECT METH UR: ABNORMAL
UROBILINOGEN UR STRIP-ACNC: 0.2 E.U./DL
WBC # BLD AUTO: 13.9 K/UL (ref 4–11)
WBC #/AREA URNS HPF: NORMAL /HPF (ref 0–5)

## 2025-07-09 PROCEDURE — 80053 COMPREHEN METABOLIC PANEL: CPT

## 2025-07-09 PROCEDURE — 71045 X-RAY EXAM CHEST 1 VIEW: CPT

## 2025-07-09 PROCEDURE — 93005 ELECTROCARDIOGRAM TRACING: CPT | Performed by: EMERGENCY MEDICINE

## 2025-07-09 PROCEDURE — 83690 ASSAY OF LIPASE: CPT

## 2025-07-09 PROCEDURE — 85025 COMPLETE CBC W/AUTO DIFF WBC: CPT

## 2025-07-09 PROCEDURE — 6370000000 HC RX 637 (ALT 250 FOR IP): Performed by: EMERGENCY MEDICINE

## 2025-07-09 PROCEDURE — 82803 BLOOD GASES ANY COMBINATION: CPT

## 2025-07-09 PROCEDURE — 81001 URINALYSIS AUTO W/SCOPE: CPT

## 2025-07-09 PROCEDURE — 2580000003 HC RX 258: Performed by: EMERGENCY MEDICINE

## 2025-07-09 PROCEDURE — 84703 CHORIONIC GONADOTROPIN ASSAY: CPT

## 2025-07-09 PROCEDURE — 6360000002 HC RX W HCPCS: Performed by: EMERGENCY MEDICINE

## 2025-07-09 RX ORDER — ONDANSETRON 2 MG/ML
4 INJECTION INTRAMUSCULAR; INTRAVENOUS ONCE
Status: COMPLETED | OUTPATIENT
Start: 2025-07-09 | End: 2025-07-09

## 2025-07-09 RX ORDER — FAMOTIDINE 20 MG/1
20 TABLET, FILM COATED ORAL ONCE
Status: COMPLETED | OUTPATIENT
Start: 2025-07-09 | End: 2025-07-09

## 2025-07-09 RX ORDER — KETOROLAC TROMETHAMINE 30 MG/ML
15 INJECTION, SOLUTION INTRAMUSCULAR; INTRAVENOUS ONCE
Status: COMPLETED | OUTPATIENT
Start: 2025-07-09 | End: 2025-07-09

## 2025-07-09 RX ORDER — LIDOCAINE 4 G/G
1 PATCH TOPICAL ONCE
Status: DISCONTINUED | OUTPATIENT
Start: 2025-07-09 | End: 2025-07-10 | Stop reason: HOSPADM

## 2025-07-09 RX ORDER — ONDANSETRON 4 MG/1
4 TABLET, ORALLY DISINTEGRATING ORAL 3 TIMES DAILY PRN
Qty: 21 TABLET | Refills: 0 | Status: SHIPPED | OUTPATIENT
Start: 2025-07-09 | End: 2025-07-10

## 2025-07-09 RX ORDER — 0.9 % SODIUM CHLORIDE 0.9 %
1000 INTRAVENOUS SOLUTION INTRAVENOUS ONCE
Status: COMPLETED | OUTPATIENT
Start: 2025-07-09 | End: 2025-07-09

## 2025-07-09 RX ORDER — DROPERIDOL 2.5 MG/ML
1.25 INJECTION, SOLUTION INTRAMUSCULAR; INTRAVENOUS ONCE
Status: COMPLETED | OUTPATIENT
Start: 2025-07-09 | End: 2025-07-09

## 2025-07-09 RX ADMIN — KETOROLAC TROMETHAMINE 15 MG: 30 INJECTION, SOLUTION INTRAMUSCULAR at 19:22

## 2025-07-09 RX ADMIN — DROPERIDOL 1.25 MG: 2.5 INJECTION, SOLUTION INTRAMUSCULAR; INTRAVENOUS at 21:09

## 2025-07-09 RX ADMIN — FAMOTIDINE 20 MG: 20 TABLET, FILM COATED ORAL at 20:36

## 2025-07-09 RX ADMIN — ONDANSETRON 4 MG: 2 INJECTION, SOLUTION INTRAMUSCULAR; INTRAVENOUS at 19:22

## 2025-07-09 RX ADMIN — LIDOCAINE HYDROCHLORIDE: 20 SOLUTION ORAL at 20:37

## 2025-07-09 RX ADMIN — SODIUM CHLORIDE 1000 ML: 0.9 INJECTION, SOLUTION INTRAVENOUS at 20:39

## 2025-07-09 RX ADMIN — SODIUM CHLORIDE 1000 ML: 0.9 INJECTION, SOLUTION INTRAVENOUS at 19:27

## 2025-07-09 ASSESSMENT — PAIN - FUNCTIONAL ASSESSMENT
PAIN_FUNCTIONAL_ASSESSMENT: 0-10

## 2025-07-09 ASSESSMENT — PAIN DESCRIPTION - ORIENTATION
ORIENTATION: LOWER
ORIENTATION: LOWER;MID
ORIENTATION: MID;LOWER
ORIENTATION: LOWER;MID

## 2025-07-09 ASSESSMENT — PAIN DESCRIPTION - DESCRIPTORS
DESCRIPTORS: SHOOTING;SHARP
DESCRIPTORS: SHARP;SHOOTING
DESCRIPTORS: BURNING
DESCRIPTORS: ACHING

## 2025-07-09 ASSESSMENT — PAIN DESCRIPTION - LOCATION
LOCATION: BACK

## 2025-07-09 ASSESSMENT — LIFESTYLE VARIABLES
HOW MANY STANDARD DRINKS CONTAINING ALCOHOL DO YOU HAVE ON A TYPICAL DAY: PATIENT DOES NOT DRINK
HOW OFTEN DO YOU HAVE A DRINK CONTAINING ALCOHOL: NEVER

## 2025-07-09 ASSESSMENT — PAIN SCALES - GENERAL
PAINLEVEL_OUTOF10: 0
PAINLEVEL_OUTOF10: 7
PAINLEVEL_OUTOF10: 6
PAINLEVEL_OUTOF10: 8
PAINLEVEL_OUTOF10: 8

## 2025-07-09 NOTE — ED NOTES
Patient refused for her oral medications as of this time, she told this RN that she cannot tolerate it.

## 2025-07-09 NOTE — ED PROVIDER NOTES
Emergency Department Provider Note  Location: [facility] EMERGENCY DEPARTMENT  7/9/2025     Patient Identification  Rossy Olvera is a 25 y.o. female    Chief Complaint  Back Pain and Vomiting (Patient reports lower back pain and N/V since 0840, history of cannabis hyperemesis, this has happened before and she quit smoking however starting smoking again in June. Has not taken medications today)          HPI  (History provided by patient and family member patient and mother)  Patient is a 25-year-old female insulin-dependent diabetic history of cannabis hyperemesis who presents with intractable nausea and vomiting since this morning.  Multiple episodes nonbloody nonbilious emesis.  Denies any severe abdominal pain.  Does report some lower back pain that started around the same time that she was vomiting.  No fevers no urinary symptoms no cough.  She reports that she recently did smoke marijuana again after being marijuana free for several months.  She reports this is similar to prior episodes of cannabis induced hyperemesis.      Nursing Notes were all reviewed and agreed with, or any disagreements were addressed in the HPI:  Allergies: No Known Allergies    Past medical history:  has a past medical history of Abdominal pain, ADHD, Back pain, and Diabetes mellitus (HCC).    Past surgical history:  has a past surgical history that includes Tonsillectomy and Esophagogastroduodenoscopy.    Home medications:   Prior to Admission medications    Medication Sig Start Date End Date Taking? Authorizing Provider   ondansetron (ZOFRAN-ODT) 4 MG disintegrating tablet Take 1 tablet by mouth 3 times daily as needed for Nausea or Vomiting 7/9/25  Yes Aryan Torre MD   dicyclomine (BENTYL) 10 MG capsule Take 1 capsule by mouth 4 times daily 8/20/23   Rajiv Jaime PA   metoclopramide (REGLAN) 10 MG tablet Take 1 tablet by mouth 3 times daily (with meals) 8/20/23   Rajiv Jaime PA   [facility] [address]

## 2025-07-10 ENCOUNTER — HOSPITAL ENCOUNTER (EMERGENCY)
Age: 25
Discharge: HOME | DRG: 074 | End: 2025-07-10
Attending: EMERGENCY MEDICINE
Payer: COMMERCIAL

## 2025-07-10 VITALS
RESPIRATION RATE: 16 BRPM | SYSTOLIC BLOOD PRESSURE: 121 MMHG | TEMPERATURE: 97.6 F | DIASTOLIC BLOOD PRESSURE: 74 MMHG | HEART RATE: 73 BPM | BODY MASS INDEX: 31.97 KG/M2 | WEIGHT: 174.8 LBS | OXYGEN SATURATION: 99 %

## 2025-07-10 DIAGNOSIS — R11.2 NAUSEA AND VOMITING, UNSPECIFIED VOMITING TYPE: Primary | ICD-10-CM

## 2025-07-10 DIAGNOSIS — E86.0 DEHYDRATION: ICD-10-CM

## 2025-07-10 LAB
ALBUMIN SERPL-MCNC: 3.8 G/DL (ref 3.4–5)
ALBUMIN/GLOB SERPL: 1.7 {RATIO} (ref 1.1–2.2)
ALP SERPL-CCNC: 49 U/L (ref 40–129)
ALT SERPL-CCNC: 17 U/L (ref 10–40)
ANION GAP SERPL CALCULATED.3IONS-SCNC: 14 MMOL/L (ref 3–16)
AST SERPL-CCNC: 20 U/L (ref 15–37)
BACTERIA URNS QL MICRO: ABNORMAL /HPF
BASE EXCESS BLDV CALC-SCNC: -4.6 MMOL/L (ref -3–3)
BASOPHILS # BLD: 0.1 K/UL (ref 0–0.2)
BASOPHILS NFR BLD: 0.4 %
BILIRUB SERPL-MCNC: 0.4 MG/DL (ref 0–1)
BILIRUB UR QL STRIP.AUTO: ABNORMAL
BUN SERPL-MCNC: 12 MG/DL (ref 7–20)
CALCIUM SERPL-MCNC: 8.9 MG/DL (ref 8.3–10.6)
CHLORIDE SERPL-SCNC: 106 MMOL/L (ref 99–110)
CLARITY UR: CLEAR
CO2 BLDV-SCNC: 18 MMOL/L
CO2 SERPL-SCNC: 19 MMOL/L (ref 21–32)
COHGB MFR BLDV: 3.1 % (ref 0–1.5)
COLOR UR: YELLOW
CREAT SERPL-MCNC: 0.7 MG/DL (ref 0.6–1.1)
DEPRECATED RDW RBC AUTO: 13.8 % (ref 12.4–15.4)
EKG ATRIAL RATE: 61 BPM
EKG DIAGNOSIS: NORMAL
EKG P AXIS: 36 DEGREES
EKG P-R INTERVAL: 146 MS
EKG Q-T INTERVAL: 432 MS
EKG QRS DURATION: 78 MS
EKG QTC CALCULATION (BAZETT): 434 MS
EKG R AXIS: 61 DEGREES
EKG T AXIS: 71 DEGREES
EKG VENTRICULAR RATE: 61 BPM
EOSINOPHIL # BLD: 0 K/UL (ref 0–0.6)
EOSINOPHIL NFR BLD: 0.1 %
EPI CELLS #/AREA URNS HPF: ABNORMAL /HPF (ref 0–5)
GFR SERPLBLD CREATININE-BSD FMLA CKD-EPI: >90 ML/MIN/{1.73_M2}
GLUCOSE SERPL-MCNC: 84 MG/DL (ref 70–99)
GLUCOSE UR STRIP.AUTO-MCNC: 500 MG/DL
HCG SERPL QL: NEGATIVE
HCO3 BLDV-SCNC: 17.6 MMOL/L (ref 23–29)
HCT VFR BLD AUTO: 38.4 % (ref 36–48)
HGB BLD-MCNC: 12.8 G/DL (ref 12–16)
HGB UR QL STRIP.AUTO: ABNORMAL
KETONES UR STRIP.AUTO-MCNC: >=80 MG/DL
LEUKOCYTE ESTERASE UR QL STRIP.AUTO: NEGATIVE
LYMPHOCYTES # BLD: 1.1 K/UL (ref 1–5.1)
LYMPHOCYTES NFR BLD: 6.2 %
MCH RBC QN AUTO: 29.6 PG (ref 26–34)
MCHC RBC AUTO-ENTMCNC: 33.4 G/DL (ref 31–36)
MCV RBC AUTO: 88.4 FL (ref 80–100)
METHGB MFR BLDV: 0.2 %
MONOCYTES # BLD: 0.4 K/UL (ref 0–1.3)
MONOCYTES NFR BLD: 2 %
MUCOUS THREADS #/AREA URNS LPF: ABNORMAL /LPF
NEUTROPHILS # BLD: 16.7 K/UL (ref 1.7–7.7)
NEUTROPHILS NFR BLD: 91.3 %
NITRITE UR QL STRIP.AUTO: NEGATIVE
O2 THERAPY: ABNORMAL
PCO2 BLDV: 25.6 MMHG (ref 40–50)
PH BLDV: 7.46 [PH] (ref 7.35–7.45)
PH UR STRIP.AUTO: 6 [PH] (ref 5–8)
PLATELET # BLD AUTO: 392 K/UL (ref 135–450)
PMV BLD AUTO: 8.5 FL (ref 5–10.5)
PO2 BLDV: 95.6 MMHG (ref 25–40)
POTASSIUM SERPL-SCNC: 3.6 MMOL/L (ref 3.5–5.1)
PROT SERPL-MCNC: 6 G/DL (ref 6.4–8.2)
PROT UR STRIP.AUTO-MCNC: 30 MG/DL
RBC # BLD AUTO: 4.34 M/UL (ref 4–5.2)
RBC #/AREA URNS HPF: ABNORMAL /HPF (ref 0–4)
REASON FOR REJECTION: NORMAL
REJECTED TEST: NORMAL
SAO2 % BLDV: 98 %
SODIUM SERPL-SCNC: 139 MMOL/L (ref 136–145)
SP GR UR STRIP.AUTO: >=1.03 (ref 1–1.03)
UA COMPLETE W REFLEX CULTURE PNL UR: ABNORMAL
UA DIPSTICK W REFLEX MICRO PNL UR: YES
URN SPEC COLLECT METH UR: ABNORMAL
UROBILINOGEN UR STRIP-ACNC: 0.2 E.U./DL
WBC # BLD AUTO: 18.3 K/UL (ref 4–11)
WBC #/AREA URNS HPF: ABNORMAL /HPF (ref 0–5)
YEAST URNS QL MICRO: PRESENT /HPF

## 2025-07-10 PROCEDURE — 85025 COMPLETE CBC W/AUTO DIFF WBC: CPT

## 2025-07-10 PROCEDURE — 93010 ELECTROCARDIOGRAM REPORT: CPT | Performed by: INTERNAL MEDICINE

## 2025-07-10 PROCEDURE — 93005 ELECTROCARDIOGRAM TRACING: CPT | Performed by: EMERGENCY MEDICINE

## 2025-07-10 PROCEDURE — 80053 COMPREHEN METABOLIC PANEL: CPT

## 2025-07-10 PROCEDURE — 84703 CHORIONIC GONADOTROPIN ASSAY: CPT

## 2025-07-10 PROCEDURE — 6360000002 HC RX W HCPCS: Performed by: EMERGENCY MEDICINE

## 2025-07-10 PROCEDURE — 82803 BLOOD GASES ANY COMBINATION: CPT

## 2025-07-10 PROCEDURE — 81001 URINALYSIS AUTO W/SCOPE: CPT

## 2025-07-10 PROCEDURE — 2580000003 HC RX 258: Performed by: EMERGENCY MEDICINE

## 2025-07-10 RX ORDER — SODIUM CHLORIDE, SODIUM LACTATE, POTASSIUM CHLORIDE, AND CALCIUM CHLORIDE .6; .31; .03; .02 G/100ML; G/100ML; G/100ML; G/100ML
2000 INJECTION, SOLUTION INTRAVENOUS ONCE
Status: COMPLETED | OUTPATIENT
Start: 2025-07-10 | End: 2025-07-10

## 2025-07-10 RX ORDER — ONDANSETRON 4 MG/1
4 TABLET, ORALLY DISINTEGRATING ORAL 3 TIMES DAILY PRN
Qty: 21 TABLET | Refills: 0 | Status: SHIPPED | OUTPATIENT
Start: 2025-07-10

## 2025-07-10 RX ORDER — ONDANSETRON 2 MG/ML
4 INJECTION INTRAMUSCULAR; INTRAVENOUS ONCE
Status: COMPLETED | OUTPATIENT
Start: 2025-07-10 | End: 2025-07-10

## 2025-07-10 RX ADMIN — SODIUM CHLORIDE, SODIUM LACTATE, POTASSIUM CHLORIDE, AND CALCIUM CHLORIDE 2000 ML: .6; .31; .03; .02 INJECTION, SOLUTION INTRAVENOUS at 14:39

## 2025-07-10 RX ADMIN — ONDANSETRON 4 MG: 2 INJECTION, SOLUTION INTRAMUSCULAR; INTRAVENOUS at 14:41

## 2025-07-10 ASSESSMENT — PAIN - FUNCTIONAL ASSESSMENT: PAIN_FUNCTIONAL_ASSESSMENT: 0-10

## 2025-07-10 ASSESSMENT — PAIN SCALES - GENERAL: PAINLEVEL_OUTOF10: 0

## 2025-07-10 NOTE — ED NOTES
Written and verbal discharge provided to patient and family member, patient verbalizes understanding. IV removed, no complications, dressing applied. Patient ambulatory with steady gait, GCS 15, no acute signs or symptoms of distress. Patient discharged at this time with family member.

## 2025-07-10 NOTE — ED NOTES
Discharge with instructions and medications discussed, follow up with PCP informed, verbalized understanding. Left stable ambulatory with all his belongings.

## 2025-07-10 NOTE — ED PROVIDER NOTES
[facility] EMERGENCY DEPARTMENT     EMERGENCY DEPARTMENT RESIDENT ENCOUNTER            Pt Name: Rossy Olvera   MRN: [id number]   Birthdate 2000   Date of evaluation: 7/10/2025   Provider: Arline Turpin MD   PCP: Leny Cervantes MD   Note Started: 1:27 PM EDT 7/10/25          CHIEF COMPLAINT     Chief Complaint   Patient presents with    Nausea    Vomiting     Patient seen in the ED yesterday for nausea and vomiting. Received PIV fluids and droperidol. [place] better and was discharged. States she hasn't smoked weed in two weeks. Returns today with the same complaints.              HISTORY OF PRESENT ILLNESS:   History from : Patient   Limitations to history : None     Rossy Olvera is a 25 y.o. female presenting to the emergency department with ongoing nausea and vomiting for the past two days. She was seen in ER last night for similar symptoms and was found to have an elevated blood glucose level of 250 mg/dL and ketones in her urine. Admission was advised, but the patient refused and went home. She returned this morning with worsening nausea, vomiting, and new-onset lower abdominal pain plus tenderness. The abdominal pain is radiating to the back. She denies diarrhea, headache, chest pain, or upper back pain. She reports approximately 10 episodes of vomiting per day, described as watery, yellow, non-bloody, and non-mucoid. The vomiting is constant and has not improved with home interventions.     Nursing Notes were all reviewed and agreed with, or any disagreements were addressed in the HPI.     REVIEW OF SYSTEMS :    Review of Systems     MEDICAL HISTORY   has a past medical history of Abdominal pain, ADHD, Back pain, Cannabis hyperemesis syndrome concurrent with and due to cannabis abuse (HCC), and Diabetes mellitus (HCC).    Past Surgical History:   Procedure Laterality Date    ESOPHAGOGASTRODUODENOSCOPY      TONSILLECTOMY        CURRENTMEDICATIONS       Previous Medications    CLONIDINE (CATAPRES)  cannabis hyperemesis presenting with nausea vomiting since 2 days.The clinical picture is concerning for possible diabetic or starvation ketosis, early DKA, viral or gastritic gastroenteritis, early pancreatitis, or UTI-related nausea. The presence of ketonuria and hyperglycemia on prior visit supports a metabolic component. Given the persistent vomiting and ketone production, DKA must be ruled out even if the glucose is not critically high. Labs including lactate, glucose, CMP, CBC, urinalysis, and ECG have been sent. The patient was treated with 2L of IV fluids and IV ondansetron. She appears dehydrated but is currently hemodynamically stable. Given recurrent symptoms, poor oral intake, and risk of electrolyte imbalances and worsening ketosis, further decision for admission depends upon monitoring, serial labs, and continued IV hydration.           Patient was given the following medications:   Medications   lactated ringers bolus 2,000 mL (has no administration in time range)   ondansetron (ZOFRAN) injection 4 mg (has no administration in time range)        CONSULTS: (Who and What was discussed)  None    Discussion with Other Profesionals : None    Social Determinants : None    Records Reviewed : None    Chronic Conditions:   has a past medical history of Abdominal pain, ADHD, Back pain, Cannabis hyperemesis syndrome concurrent with and due to cannabis abuse (HCC), and Diabetes mellitus (HCC).        Disposition Considerations:    I am the Primary Clinician of Record.        FINAL IMPRESSION    No diagnosis found.       DISPOSITION/PLAN     PATIENT REFERRED TO:   No follow-up provider specified.     DISCHARGE MEDICATIONS:   New Prescriptions    No medications on file        DISCONTINUED MEDICATIONS:   Discontinued Medications    No medications on file              (Please note that portions of this note were completed with a voice recognition program.  Efforts were made to edit the dictations but occasionally

## 2025-07-10 NOTE — ED PROVIDER NOTES
EMERGENCY DEPARTMENT PROVIDER NOTE;   RESIDENT ATTESTATION      CHIEF COMPLAINT  Chief Complaint   Patient presents with    Nausea    Vomiting     Patient seen in the ED yesterday for nausea and vomiting. Received PIV fluids and droperidol. [place] better and was discharged. States she hasn't smoked weed in two weeks. Returns today with the same complaints.        Past Medical History:   Diagnosis Date    Abdominal pain     ADHD     Back pain     Cannabis hyperemesis syndrome concurrent with and due to cannabis abuse (HCC)     Diabetes mellitus (HCC)         Vitals:    07/10/25 1301   BP: (!) 177/84   Pulse: 60   Resp: 20   Temp: 97.6 °F (36.4 °C)   TempSrc: Oral   SpO2: 98%   Weight: 79.3 kg (174 lb 12.8 oz)         Rossy Olvera is a 25 y.o. female presenting to the ED with concerns of nausea vomiting, in the setting of type 1 diabetes.  Was here yesterday, shown to be in slight DKA, given IV fluids, and ultimately discharged home, after offered admission.    EKG here in the emergency department shows sinus bradycardia with a rate of 51.  No ST segment elevation, some depression, hyperacute T waves.  QTc 420.    Here in the emergency department, does have ketonuria, but no DKA, CMP ultimately back, and shows no anion gap, and her bicarb is improved from yesterday.    After 2 L IV fluids and Zofran, patient was asking to go home.  She says she felt much better.  She was asking for Zofran at discharge.    Will discharge home in stable condition.    Of note, she has an uptrending white blood cell count, and says she has been not feeling ill.  Urinalysis negative for infection.  Lungs are clear to auscultation bilaterally.    No skin infection.    Discharged home in stable condition.  Strict return precautions provided at length    IMPRESSION:    ICD-10-CM    1. Nausea and vomiting, unspecified vomiting type  R11.2       2. Dehydration  E86.0             INayeli DO am the primary clinician of

## 2025-07-11 ENCOUNTER — HOSPITAL ENCOUNTER (INPATIENT)
Age: 25
LOS: 5 days | Discharge: HOME | DRG: 074 | End: 2025-07-16
Attending: EMERGENCY MEDICINE | Admitting: INTERNAL MEDICINE
Payer: COMMERCIAL

## 2025-07-11 ENCOUNTER — APPOINTMENT (OUTPATIENT)
Dept: CT IMAGING | Age: 25
DRG: 074 | End: 2025-07-11
Payer: COMMERCIAL

## 2025-07-11 DIAGNOSIS — E86.0 DEHYDRATION: ICD-10-CM

## 2025-07-11 DIAGNOSIS — E83.42 HYPOMAGNESEMIA: ICD-10-CM

## 2025-07-11 DIAGNOSIS — R11.2 NAUSEA AND VOMITING, UNSPECIFIED VOMITING TYPE: Primary | ICD-10-CM

## 2025-07-11 DIAGNOSIS — E87.6 HYPOKALEMIA: ICD-10-CM

## 2025-07-11 LAB
ALBUMIN SERPL-MCNC: 4.1 G/DL (ref 3.4–5)
ALBUMIN/GLOB SERPL: 1.8 {RATIO} (ref 1.1–2.2)
ALP SERPL-CCNC: 52 U/L (ref 40–129)
ALT SERPL-CCNC: 21 U/L (ref 10–40)
ANION GAP SERPL CALCULATED.3IONS-SCNC: 18 MMOL/L (ref 3–16)
AST SERPL-CCNC: 24 U/L (ref 15–37)
BACTERIA URNS QL MICRO: ABNORMAL /HPF
BASE EXCESS BLDV CALC-SCNC: -0.7 MMOL/L (ref -3–3)
BASOPHILS # BLD: 0 K/UL (ref 0–0.2)
BASOPHILS NFR BLD: 0.4 %
BETA-HYDROXYBUTYRATE: 2.56 MMOL/L (ref 0–0.27)
BILIRUB SERPL-MCNC: 0.6 MG/DL (ref 0–1)
BILIRUB UR QL STRIP.AUTO: NEGATIVE
BUN SERPL-MCNC: 8 MG/DL (ref 7–20)
CALCIUM SERPL-MCNC: 9.1 MG/DL (ref 8.3–10.6)
CHLORIDE SERPL-SCNC: 104 MMOL/L (ref 99–110)
CLARITY UR: CLEAR
CO2 BLDV-SCNC: 21 MMOL/L
CO2 SERPL-SCNC: 17 MMOL/L (ref 21–32)
COHGB MFR BLDV: 1.5 % (ref 0–1.5)
COLOR UR: YELLOW
CREAT SERPL-MCNC: 0.7 MG/DL (ref 0.6–1.1)
DEPRECATED RDW RBC AUTO: 13.4 % (ref 12.4–15.4)
EKG ATRIAL RATE: 51 BPM
EKG DIAGNOSIS: NORMAL
EKG P AXIS: 59 DEGREES
EKG P-R INTERVAL: 132 MS
EKG Q-T INTERVAL: 456 MS
EKG QRS DURATION: 86 MS
EKG QTC CALCULATION (BAZETT): 420 MS
EKG R AXIS: 55 DEGREES
EKG T AXIS: 63 DEGREES
EKG VENTRICULAR RATE: 51 BPM
EOSINOPHIL # BLD: 0 K/UL (ref 0–0.6)
EOSINOPHIL NFR BLD: 0 %
EPI CELLS #/AREA URNS HPF: ABNORMAL /HPF (ref 0–5)
GFR SERPLBLD CREATININE-BSD FMLA CKD-EPI: >90 ML/MIN/{1.73_M2}
GLUCOSE BLD-MCNC: 109 MG/DL (ref 70–99)
GLUCOSE BLD-MCNC: 126 MG/DL (ref 70–99)
GLUCOSE BLD-MCNC: 134 MG/DL (ref 70–99)
GLUCOSE BLD-MCNC: 55 MG/DL (ref 70–99)
GLUCOSE BLD-MCNC: 59 MG/DL (ref 70–99)
GLUCOSE SERPL-MCNC: 99 MG/DL (ref 70–99)
GLUCOSE UR STRIP.AUTO-MCNC: NEGATIVE MG/DL
HCG SERPL QL: NEGATIVE
HCO3 BLDV-SCNC: 19.8 MMOL/L (ref 23–29)
HCT VFR BLD AUTO: 37.9 % (ref 36–48)
HGB BLD-MCNC: 12.9 G/DL (ref 12–16)
HGB UR QL STRIP.AUTO: ABNORMAL
KETONES UR STRIP.AUTO-MCNC: >=80 MG/DL
LACTATE BLDV-SCNC: 1.4 MMOL/L (ref 0.4–1.9)
LEUKOCYTE ESTERASE UR QL STRIP.AUTO: NEGATIVE
LIPASE SERPL-CCNC: 12 U/L (ref 13–60)
LYMPHOCYTES # BLD: 1.5 K/UL (ref 1–5.1)
LYMPHOCYTES NFR BLD: 13.5 %
MAGNESIUM SERPL-MCNC: 1.77 MG/DL (ref 1.8–2.4)
MCH RBC QN AUTO: 29.7 PG (ref 26–34)
MCHC RBC AUTO-ENTMCNC: 34 G/DL (ref 31–36)
MCV RBC AUTO: 87.2 FL (ref 80–100)
METHGB MFR BLDV: 0.4 %
MONOCYTES # BLD: 0.5 K/UL (ref 0–1.3)
MONOCYTES NFR BLD: 4.5 %
NEUTROPHILS # BLD: 8.9 K/UL (ref 1.7–7.7)
NEUTROPHILS NFR BLD: 81.6 %
NITRITE UR QL STRIP.AUTO: NEGATIVE
O2 THERAPY: ABNORMAL
PCO2 BLDV: 23.1 MMHG (ref 40–50)
PERFORMED ON: ABNORMAL
PH BLDV: 7.55 [PH] (ref 7.35–7.45)
PH UR STRIP.AUTO: 6 [PH] (ref 5–8)
PLATELET # BLD AUTO: 355 K/UL (ref 135–450)
PMV BLD AUTO: 8.6 FL (ref 5–10.5)
PO2 BLDV: 135.9 MMHG (ref 25–40)
POTASSIUM SERPL-SCNC: 3.4 MMOL/L (ref 3.5–5.1)
PROT SERPL-MCNC: 6.4 G/DL (ref 6.4–8.2)
PROT UR STRIP.AUTO-MCNC: NEGATIVE MG/DL
RBC # BLD AUTO: 4.35 M/UL (ref 4–5.2)
RBC #/AREA URNS HPF: ABNORMAL /HPF (ref 0–4)
SAO2 % BLDV: 99 %
SODIUM SERPL-SCNC: 139 MMOL/L (ref 136–145)
SP GR UR STRIP.AUTO: >=1.03 (ref 1–1.03)
UA COMPLETE W REFLEX CULTURE PNL UR: ABNORMAL
UA DIPSTICK W REFLEX MICRO PNL UR: YES
URN SPEC COLLECT METH UR: ABNORMAL
UROBILINOGEN UR STRIP-ACNC: 0.2 E.U./DL
WBC # BLD AUTO: 10.9 K/UL (ref 4–11)
WBC #/AREA URNS HPF: ABNORMAL /HPF (ref 0–5)

## 2025-07-11 PROCEDURE — 85025 COMPLETE CBC W/AUTO DIFF WBC: CPT

## 2025-07-11 PROCEDURE — 6360000002 HC RX W HCPCS: Performed by: STUDENT IN AN ORGANIZED HEALTH CARE EDUCATION/TRAINING PROGRAM

## 2025-07-11 PROCEDURE — 1200000000 HC SEMI PRIVATE

## 2025-07-11 PROCEDURE — 83735 ASSAY OF MAGNESIUM: CPT

## 2025-07-11 PROCEDURE — 82010 KETONE BODYS QUAN: CPT

## 2025-07-11 PROCEDURE — 99285 EMERGENCY DEPT VISIT HI MDM: CPT

## 2025-07-11 PROCEDURE — 2580000003 HC RX 258: Performed by: INTERNAL MEDICINE

## 2025-07-11 PROCEDURE — 6360000002 HC RX W HCPCS: Performed by: PHYSICIAN ASSISTANT

## 2025-07-11 PROCEDURE — 6360000002 HC RX W HCPCS: Performed by: INTERNAL MEDICINE

## 2025-07-11 PROCEDURE — 96365 THER/PROPH/DIAG IV INF INIT: CPT

## 2025-07-11 PROCEDURE — 96361 HYDRATE IV INFUSION ADD-ON: CPT

## 2025-07-11 PROCEDURE — 2580000003 HC RX 258: Performed by: PHYSICIAN ASSISTANT

## 2025-07-11 PROCEDURE — 74177 CT ABD & PELVIS W/CONTRAST: CPT

## 2025-07-11 PROCEDURE — 83690 ASSAY OF LIPASE: CPT

## 2025-07-11 PROCEDURE — 83605 ASSAY OF LACTIC ACID: CPT

## 2025-07-11 PROCEDURE — 6360000004 HC RX CONTRAST MEDICATION: Performed by: PHYSICIAN ASSISTANT

## 2025-07-11 PROCEDURE — 84703 CHORIONIC GONADOTROPIN ASSAY: CPT

## 2025-07-11 PROCEDURE — 80053 COMPREHEN METABOLIC PANEL: CPT

## 2025-07-11 PROCEDURE — 2500000003 HC RX 250 WO HCPCS: Performed by: PHYSICIAN ASSISTANT

## 2025-07-11 PROCEDURE — 82803 BLOOD GASES ANY COMBINATION: CPT

## 2025-07-11 PROCEDURE — 81001 URINALYSIS AUTO W/SCOPE: CPT

## 2025-07-11 PROCEDURE — 96375 TX/PRO/DX INJ NEW DRUG ADDON: CPT

## 2025-07-11 RX ORDER — ACETAMINOPHEN 650 MG/1
650 SUPPOSITORY RECTAL EVERY 6 HOURS PRN
Status: DISCONTINUED | OUTPATIENT
Start: 2025-07-11 | End: 2025-07-16 | Stop reason: HOSPADM

## 2025-07-11 RX ORDER — DROPERIDOL 2.5 MG/ML
1.25 INJECTION, SOLUTION INTRAMUSCULAR; INTRAVENOUS ONCE
Status: COMPLETED | OUTPATIENT
Start: 2025-07-11 | End: 2025-07-11

## 2025-07-11 RX ORDER — 0.9 % SODIUM CHLORIDE 0.9 %
1000 INTRAVENOUS SOLUTION INTRAVENOUS ONCE
Status: COMPLETED | OUTPATIENT
Start: 2025-07-11 | End: 2025-07-11

## 2025-07-11 RX ORDER — ENOXAPARIN SODIUM 100 MG/ML
40 INJECTION SUBCUTANEOUS DAILY
Status: DISCONTINUED | OUTPATIENT
Start: 2025-07-11 | End: 2025-07-16 | Stop reason: HOSPADM

## 2025-07-11 RX ORDER — DEXTROSE MONOHYDRATE 25 G/50ML
25 INJECTION, SOLUTION INTRAVENOUS ONCE
Status: COMPLETED | OUTPATIENT
Start: 2025-07-11 | End: 2025-07-11

## 2025-07-11 RX ORDER — GLUCAGON 1 MG/ML
1 KIT INJECTION PRN
Status: DISCONTINUED | OUTPATIENT
Start: 2025-07-11 | End: 2025-07-16 | Stop reason: HOSPADM

## 2025-07-11 RX ORDER — POTASSIUM CHLORIDE 1500 MG/1
40 TABLET, EXTENDED RELEASE ORAL PRN
Status: DISCONTINUED | OUTPATIENT
Start: 2025-07-11 | End: 2025-07-13

## 2025-07-11 RX ORDER — SODIUM CHLORIDE 0.9 % (FLUSH) 0.9 %
5-40 SYRINGE (ML) INJECTION EVERY 12 HOURS SCHEDULED
Status: DISCONTINUED | OUTPATIENT
Start: 2025-07-11 | End: 2025-07-16 | Stop reason: HOSPADM

## 2025-07-11 RX ORDER — ACETAMINOPHEN 325 MG/1
650 TABLET ORAL EVERY 6 HOURS PRN
Status: DISCONTINUED | OUTPATIENT
Start: 2025-07-11 | End: 2025-07-16 | Stop reason: HOSPADM

## 2025-07-11 RX ORDER — ONDANSETRON 4 MG/1
4 TABLET, ORALLY DISINTEGRATING ORAL EVERY 8 HOURS PRN
Status: DISCONTINUED | OUTPATIENT
Start: 2025-07-11 | End: 2025-07-16 | Stop reason: HOSPADM

## 2025-07-11 RX ORDER — SODIUM CHLORIDE 9 MG/ML
INJECTION, SOLUTION INTRAVENOUS PRN
Status: DISCONTINUED | OUTPATIENT
Start: 2025-07-11 | End: 2025-07-16 | Stop reason: HOSPADM

## 2025-07-11 RX ORDER — MAGNESIUM SULFATE 1 G/100ML
1000 INJECTION INTRAVENOUS ONCE
Status: COMPLETED | OUTPATIENT
Start: 2025-07-11 | End: 2025-07-11

## 2025-07-11 RX ORDER — SODIUM CHLORIDE 9 MG/ML
INJECTION, SOLUTION INTRAVENOUS CONTINUOUS
Status: ACTIVE | OUTPATIENT
Start: 2025-07-11 | End: 2025-07-12

## 2025-07-11 RX ORDER — PANTOPRAZOLE SODIUM 40 MG/10ML
40 INJECTION, POWDER, LYOPHILIZED, FOR SOLUTION INTRAVENOUS ONCE
Status: COMPLETED | OUTPATIENT
Start: 2025-07-11 | End: 2025-07-11

## 2025-07-11 RX ORDER — INSULIN LISPRO 100 [IU]/ML
0-4 INJECTION, SOLUTION INTRAVENOUS; SUBCUTANEOUS
Status: DISCONTINUED | OUTPATIENT
Start: 2025-07-11 | End: 2025-07-12

## 2025-07-11 RX ORDER — METOCLOPRAMIDE HYDROCHLORIDE 5 MG/ML
10 INJECTION INTRAMUSCULAR; INTRAVENOUS EVERY 6 HOURS
Status: DISPENSED | OUTPATIENT
Start: 2025-07-11 | End: 2025-07-12

## 2025-07-11 RX ORDER — POLYETHYLENE GLYCOL 3350 17 G/17G
17 POWDER, FOR SOLUTION ORAL DAILY PRN
Status: DISCONTINUED | OUTPATIENT
Start: 2025-07-11 | End: 2025-07-16 | Stop reason: HOSPADM

## 2025-07-11 RX ORDER — DEXTROSE MONOHYDRATE 100 MG/ML
INJECTION, SOLUTION INTRAVENOUS CONTINUOUS PRN
Status: DISCONTINUED | OUTPATIENT
Start: 2025-07-11 | End: 2025-07-16 | Stop reason: HOSPADM

## 2025-07-11 RX ORDER — IOPAMIDOL 755 MG/ML
75 INJECTION, SOLUTION INTRAVASCULAR
Status: COMPLETED | OUTPATIENT
Start: 2025-07-11 | End: 2025-07-11

## 2025-07-11 RX ORDER — MAGNESIUM SULFATE IN WATER 40 MG/ML
2000 INJECTION, SOLUTION INTRAVENOUS PRN
Status: DISCONTINUED | OUTPATIENT
Start: 2025-07-11 | End: 2025-07-16 | Stop reason: HOSPADM

## 2025-07-11 RX ORDER — POTASSIUM CHLORIDE 7.45 MG/ML
10 INJECTION INTRAVENOUS PRN
Status: DISCONTINUED | OUTPATIENT
Start: 2025-07-11 | End: 2025-07-13

## 2025-07-11 RX ORDER — LORAZEPAM 2 MG/ML
1 INJECTION INTRAMUSCULAR ONCE
Status: COMPLETED | OUTPATIENT
Start: 2025-07-11 | End: 2025-07-11

## 2025-07-11 RX ORDER — SODIUM CHLORIDE 0.9 % (FLUSH) 0.9 %
5-40 SYRINGE (ML) INJECTION PRN
Status: DISCONTINUED | OUTPATIENT
Start: 2025-07-11 | End: 2025-07-16 | Stop reason: HOSPADM

## 2025-07-11 RX ORDER — ONDANSETRON 2 MG/ML
4 INJECTION INTRAMUSCULAR; INTRAVENOUS EVERY 6 HOURS PRN
Status: DISCONTINUED | OUTPATIENT
Start: 2025-07-11 | End: 2025-07-16 | Stop reason: HOSPADM

## 2025-07-11 RX ADMIN — IOPAMIDOL 75 ML: 755 INJECTION, SOLUTION INTRAVENOUS at 11:22

## 2025-07-11 RX ADMIN — DROPERIDOL 1.25 MG: 2.5 INJECTION, SOLUTION INTRAMUSCULAR; INTRAVENOUS at 09:43

## 2025-07-11 RX ADMIN — DEXTROSE MONOHYDRATE 25 G: 25 INJECTION, SOLUTION INTRAVENOUS at 15:36

## 2025-07-11 RX ADMIN — METOCLOPRAMIDE HYDROCHLORIDE 10 MG: 5 INJECTION INTRAMUSCULAR; INTRAVENOUS at 21:23

## 2025-07-11 RX ADMIN — LORAZEPAM 1 MG: 2 INJECTION INTRAMUSCULAR; INTRAVENOUS at 21:23

## 2025-07-11 RX ADMIN — ONDANSETRON 4 MG: 2 INJECTION, SOLUTION INTRAMUSCULAR; INTRAVENOUS at 17:02

## 2025-07-11 RX ADMIN — PANTOPRAZOLE SODIUM 40 MG: 40 INJECTION, POWDER, FOR SOLUTION INTRAVENOUS at 09:42

## 2025-07-11 RX ADMIN — DEXTROSE 125 ML: 10 SOLUTION INTRAVENOUS at 21:42

## 2025-07-11 RX ADMIN — ENOXAPARIN SODIUM 40 MG: 100 INJECTION SUBCUTANEOUS at 21:23

## 2025-07-11 RX ADMIN — SODIUM CHLORIDE 1000 ML: 0.9 INJECTION, SOLUTION INTRAVENOUS at 10:51

## 2025-07-11 RX ADMIN — SODIUM CHLORIDE 1000 ML: 0.9 INJECTION, SOLUTION INTRAVENOUS at 09:40

## 2025-07-11 RX ADMIN — MAGNESIUM SULFATE HEPTAHYDRATE 1000 MG: 1 INJECTION, SOLUTION INTRAVENOUS at 10:27

## 2025-07-11 RX ADMIN — SODIUM CHLORIDE: 0.9 INJECTION, SOLUTION INTRAVENOUS at 21:33

## 2025-07-11 ASSESSMENT — PAIN SCALES - GENERAL
PAINLEVEL_OUTOF10: 8
PAINLEVEL_OUTOF10: 4

## 2025-07-11 ASSESSMENT — PAIN - FUNCTIONAL ASSESSMENT
PAIN_FUNCTIONAL_ASSESSMENT: 0-10
PAIN_FUNCTIONAL_ASSESSMENT: 0-10

## 2025-07-11 NOTE — ED NOTES
Rossy Olvera is a 25 y.o. female admitted for  Principal Problem:    Intractable nausea and vomiting  Resolved Problems:    * No resolved hospital problems. *  .   Patient Home via self with   Chief Complaint   Patient presents with    Vomiting     3rd visit in 3 days for vomiting. Patient reports she has not picked up her prescriptions.    .  Patient is alert and Person, Place, Time, and Situation  Patient's baseline mobility: Baseline Mobility: Independent   Code Status: Prior   Cardiac Rhythm:       Is patient on baseline Oxygen: no how many Liters:   Abnormal Assessment Findings: Ongoing hyperemesis d/t THC, BG dropped in ER but was corrected and acute hepatitis     Isolation: None      NIH Score:    C-SSRS: Risk of Suicide: No Risk  Bedside swallow:        Active LDA's:   Peripheral IV 07/11/25 Proximal;Right Forearm (Active)     Patient admitted with a cagle: no If the cagle is chronic was it exchanged:  Reason for cagle:   Patient admitted with Central Line:  NA . PICC line placement confirmed: YES OR NO:[id number]}   Reason for Central line:   Was central line Inserted from an outside facility:        Family/Caregiver Present yes Any Concerns: no   Restraints no  Sitter no         Vitals: MEWS Score: 1    Vitals:    07/11/25 0842 07/11/25 1053 07/11/25 1306   BP: (!) 177/91 (!) 162/77 (!) 152/94   Pulse: 60 56 66   Resp: 18  14   Temp: 98.1 °F (36.7 °C)     SpO2: 98% 96% 97%   Weight: 80.2 kg (176 lb 12.8 oz)     Height: 1.575 m (5' 2\")         Last documented pain score (0-10 scale) Pain Level: 4  Pain medication administered No.    Pertinent or High Risk Medications/Drips: Yes- see MAR.    Pending Blood Product Administration: no    Abnormal labs:   Abnormal Labs Reviewed   CBC WITH AUTO DIFFERENTIAL - Abnormal; Notable for the following components:       Result Value    Neutrophils Absolute 8.9 (*)     All other components within normal limits   COMPREHENSIVE METABOLIC PANEL W/ REFLEX TO MG FOR LOW K -

## 2025-07-11 NOTE — ED NOTES
MD ordered clear liquid diet. Gave pt water, and 2 jellos. Pt instructed to call this RN if feelings of nausea/emesis/etc happen. Pt verbalized understanding.

## 2025-07-11 NOTE — ED NOTES
Pt attempted to eat jello and drink water. Had 2 episodes of emesis. Zofran administered per MAR. Gave pt personal hygiene items as requested. Per pt, feels strong enough to take care of her own personal hygiene. Gave pt privacy.

## 2025-07-11 NOTE — ED PROVIDER NOTES
IMPRESSION      1. Nausea and vomiting, unspecified vomiting type    2. Dehydration    3. Hypokalemia    4. Hypomagnesemia          DISPOSITION/PLAN     DISPOSITION Decision To Admit 07/11/2025 12:43:41 PM   DISPOSITION CONDITION Stable           PATIENT REFERRED TO:  No follow-up provider specified.    DISCHARGE MEDICATIONS:  New Prescriptions    No medications on file       DISCONTINUED MEDICATIONS:  Discontinued Medications    No medications on file              (Please note that portions of this note were completed with a voice recognition program.  Efforts were made to edit the dictations but occasionally words are mis-transcribed.)    Bayron Lizarraga PA-C (electronically signed)        Bayron Lizarraga PA-C  07/11/25 154

## 2025-07-11 NOTE — H&P
[facility] History & Physical    V 5.1    Date of Admission: 7/11/2025    Date of Service:  Pt seen/examined on 7/11/25     [x]Admitted to Inpatient with expected LOS greater than two midnights due to medical therapy.  []Placed in Observation status.    Chief Admission Complaint: Persistent nausea and vomiting    Presenting Admission History:      25 y.o. female with PMH significant for type 1 diabetes she does have insulin pump in place.  There is records in the chart stating she has hypertension but she denies any knowledge of hypertension and states she is on no medications for high blood pressure.    She states she used marijuana approximately 2 weeks ago and over the past 5 days has been having tractable nausea and vomiting.  She has had several visits to the ED for this.  She has been discharged on oral anti emetic agents but returns today with continued nausea and vomiting.    She does report that she last used marijuana several years ago and at that time also had significant nausea and vomiting.    She denies any chest pain or shortness of breath.  She is now be admitted for further evaluation and management      Denies chest pain, no fever chill        Assessment/Plan:        Intractable nausea vomiting : Suspect marijuana associated hyperemesis.  She will receive IV fluids, antiemetics.  Will initiate on clear liquids today and follow her response.    Discussed with patient she seems to be very sensitive to marijuana and should not use it again she agrees    Diabetes mellitus type 1 : She is maintained on insulin pump we will continue this.  Discussed with patient she states her sugars remain well-controlled with insulin pump.  She states she does not take any additional insulin through the day.  Will add sliding scale insulin while she is hospitalized.  Will check A1c    Hypertension : Noted blood pressure to be elevated this time.  She denies any prior history of hypertension.  She states

## 2025-07-12 LAB
ALBUMIN SERPL-MCNC: 4 G/DL (ref 3.4–5)
ALBUMIN/GLOB SERPL: 1.7 {RATIO} (ref 1.1–2.2)
ALP SERPL-CCNC: 52 U/L (ref 40–129)
ALT SERPL-CCNC: 23 U/L (ref 10–40)
ANION GAP SERPL CALCULATED.3IONS-SCNC: 14 MMOL/L (ref 3–16)
AST SERPL-CCNC: 27 U/L (ref 15–37)
BASOPHILS # BLD: 0.1 K/UL (ref 0–0.2)
BASOPHILS NFR BLD: 1 %
BILIRUB SERPL-MCNC: 0.5 MG/DL (ref 0–1)
BUN SERPL-MCNC: 4 MG/DL (ref 7–20)
CALCIUM SERPL-MCNC: 8.4 MG/DL (ref 8.3–10.6)
CHLORIDE SERPL-SCNC: 105 MMOL/L (ref 99–110)
CO2 SERPL-SCNC: 20 MMOL/L (ref 21–32)
CREAT SERPL-MCNC: 0.7 MG/DL (ref 0.6–1.1)
DEPRECATED RDW RBC AUTO: 13.6 % (ref 12.4–15.4)
EOSINOPHIL # BLD: 0 K/UL (ref 0–0.6)
EOSINOPHIL NFR BLD: 0.1 %
GFR SERPLBLD CREATININE-BSD FMLA CKD-EPI: >90 ML/MIN/{1.73_M2}
GLUCOSE BLD-MCNC: 103 MG/DL (ref 70–99)
GLUCOSE BLD-MCNC: 120 MG/DL (ref 70–99)
GLUCOSE BLD-MCNC: 149 MG/DL (ref 70–99)
GLUCOSE BLD-MCNC: 300 MG/DL (ref 70–99)
GLUCOSE BLD-MCNC: 53 MG/DL (ref 70–99)
GLUCOSE BLD-MCNC: 59 MG/DL (ref 70–99)
GLUCOSE BLD-MCNC: 69 MG/DL (ref 70–99)
GLUCOSE SERPL-MCNC: 92 MG/DL (ref 70–99)
HCT VFR BLD AUTO: 38.8 % (ref 36–48)
HGB BLD-MCNC: 13.3 G/DL (ref 12–16)
LYMPHOCYTES # BLD: 3.1 K/UL (ref 1–5.1)
LYMPHOCYTES NFR BLD: 24.5 %
MAGNESIUM SERPL-MCNC: 2.03 MG/DL (ref 1.8–2.4)
MCH RBC QN AUTO: 29.8 PG (ref 26–34)
MCHC RBC AUTO-ENTMCNC: 34.3 G/DL (ref 31–36)
MCV RBC AUTO: 86.9 FL (ref 80–100)
MONOCYTES # BLD: 0.9 K/UL (ref 0–1.3)
MONOCYTES NFR BLD: 7.4 %
NEUTROPHILS # BLD: 8.5 K/UL (ref 1.7–7.7)
NEUTROPHILS NFR BLD: 67 %
PERFORMED ON: ABNORMAL
PLATELET # BLD AUTO: 362 K/UL (ref 135–450)
PMV BLD AUTO: 8.9 FL (ref 5–10.5)
POTASSIUM SERPL-SCNC: 2.9 MMOL/L (ref 3.5–5.1)
POTASSIUM SERPL-SCNC: 3.6 MMOL/L (ref 3.5–5.1)
PROT SERPL-MCNC: 6.3 G/DL (ref 6.4–8.2)
RBC # BLD AUTO: 4.46 M/UL (ref 4–5.2)
SODIUM SERPL-SCNC: 139 MMOL/L (ref 136–145)
WBC # BLD AUTO: 12.8 K/UL (ref 4–11)

## 2025-07-12 PROCEDURE — 84132 ASSAY OF SERUM POTASSIUM: CPT

## 2025-07-12 PROCEDURE — 83036 HEMOGLOBIN GLYCOSYLATED A1C: CPT

## 2025-07-12 PROCEDURE — 6360000002 HC RX W HCPCS: Performed by: STUDENT IN AN ORGANIZED HEALTH CARE EDUCATION/TRAINING PROGRAM

## 2025-07-12 PROCEDURE — 85025 COMPLETE CBC W/AUTO DIFF WBC: CPT

## 2025-07-12 PROCEDURE — 36415 COLL VENOUS BLD VENIPUNCTURE: CPT

## 2025-07-12 PROCEDURE — 83735 ASSAY OF MAGNESIUM: CPT

## 2025-07-12 PROCEDURE — 2580000003 HC RX 258: Performed by: INTERNAL MEDICINE

## 2025-07-12 PROCEDURE — 2580000003 HC RX 258: Performed by: STUDENT IN AN ORGANIZED HEALTH CARE EDUCATION/TRAINING PROGRAM

## 2025-07-12 PROCEDURE — 6360000002 HC RX W HCPCS: Performed by: INTERNAL MEDICINE

## 2025-07-12 PROCEDURE — 1200000000 HC SEMI PRIVATE

## 2025-07-12 PROCEDURE — 80053 COMPREHEN METABOLIC PANEL: CPT

## 2025-07-12 RX ORDER — DEXTROSE MONOHYDRATE 100 MG/ML
INJECTION, SOLUTION INTRAVENOUS CONTINUOUS PRN
Status: DISCONTINUED | OUTPATIENT
Start: 2025-07-12 | End: 2025-07-12 | Stop reason: SDUPTHER

## 2025-07-12 RX ORDER — LORAZEPAM 2 MG/ML
0.5 INJECTION INTRAMUSCULAR EVERY 6 HOURS PRN
Status: DISCONTINUED | OUTPATIENT
Start: 2025-07-12 | End: 2025-07-13

## 2025-07-12 RX ORDER — METOCLOPRAMIDE HYDROCHLORIDE 5 MG/ML
10 INJECTION INTRAMUSCULAR; INTRAVENOUS EVERY 6 HOURS
Status: COMPLETED | OUTPATIENT
Start: 2025-07-12 | End: 2025-07-14

## 2025-07-12 RX ORDER — GLUCAGON 1 MG/ML
1 KIT INJECTION PRN
Status: DISCONTINUED | OUTPATIENT
Start: 2025-07-12 | End: 2025-07-12 | Stop reason: SDUPTHER

## 2025-07-12 RX ADMIN — POTASSIUM CHLORIDE 10 MEQ: 7.46 INJECTION, SOLUTION INTRAVENOUS at 13:05

## 2025-07-12 RX ADMIN — POTASSIUM CHLORIDE 10 MEQ: 7.46 INJECTION, SOLUTION INTRAVENOUS at 11:26

## 2025-07-12 RX ADMIN — SODIUM CHLORIDE: 0.9 INJECTION, SOLUTION INTRAVENOUS at 08:54

## 2025-07-12 RX ADMIN — SODIUM CHLORIDE: 0.9 INJECTION, SOLUTION INTRAVENOUS at 17:31

## 2025-07-12 RX ADMIN — METOCLOPRAMIDE HYDROCHLORIDE 10 MG: 5 INJECTION INTRAMUSCULAR; INTRAVENOUS at 08:44

## 2025-07-12 RX ADMIN — SODIUM CHLORIDE: 0.9 INJECTION, SOLUTION INTRAVENOUS at 08:53

## 2025-07-12 RX ADMIN — POTASSIUM CHLORIDE 10 MEQ: 7.46 INJECTION, SOLUTION INTRAVENOUS at 08:54

## 2025-07-12 RX ADMIN — METOCLOPRAMIDE HYDROCHLORIDE 10 MG: 5 INJECTION INTRAMUSCULAR; INTRAVENOUS at 14:15

## 2025-07-12 RX ADMIN — ONDANSETRON 4 MG: 2 INJECTION, SOLUTION INTRAMUSCULAR; INTRAVENOUS at 06:49

## 2025-07-12 RX ADMIN — DEXTROSE 125 ML: 10 SOLUTION INTRAVENOUS at 06:32

## 2025-07-12 RX ADMIN — POTASSIUM CHLORIDE 10 MEQ: 7.46 INJECTION, SOLUTION INTRAVENOUS at 15:23

## 2025-07-12 RX ADMIN — POTASSIUM CHLORIDE 10 MEQ: 7.46 INJECTION, SOLUTION INTRAVENOUS at 14:15

## 2025-07-12 RX ADMIN — POTASSIUM CHLORIDE 10 MEQ: 7.46 INJECTION, SOLUTION INTRAVENOUS at 10:13

## 2025-07-12 RX ADMIN — METOCLOPRAMIDE HYDROCHLORIDE 10 MG: 5 INJECTION INTRAMUSCULAR; INTRAVENOUS at 20:53

## 2025-07-12 ASSESSMENT — PAIN SCALES - GENERAL
PAINLEVEL_OUTOF10: 0
PAINLEVEL_OUTOF10: 6

## 2025-07-12 ASSESSMENT — PAIN DESCRIPTION - DESCRIPTORS: DESCRIPTORS: ACHING;CRAMPING;DISCOMFORT

## 2025-07-12 ASSESSMENT — PAIN DESCRIPTION - ORIENTATION: ORIENTATION: LOWER

## 2025-07-12 ASSESSMENT — PAIN DESCRIPTION - LOCATION: LOCATION: ABDOMEN

## 2025-07-12 NOTE — PLAN OF CARE
Problem: Chronic Conditions and Co-morbidities  Goal: Patient's chronic conditions and co-morbidity symptoms are monitored and maintained or improved  7/12/2025 1121 by Nichole Fisher RN  Outcome: Progressing  Flowsheets (Taken 7/12/2025 1121)  Care Plan - Patient's Chronic Conditions and Co-Morbidity Symptoms are Monitored and Maintained or Improved:   Monitor and assess patient's chronic conditions and comorbid symptoms for stability, deterioration, or improvement   Collaborate with multidisciplinary team to address chronic and comorbid conditions and prevent exacerbation or deterioration     Problem: Discharge Planning  Goal: Discharge to home or other facility with appropriate resources  7/12/2025 1121 by Nichole Fisher RN  Outcome: Progressing  Flowsheets (Taken 7/12/2025 1121)  Discharge to home or other facility with appropriate resources:   Identify barriers to discharge with patient and caregiver   Arrange for needed discharge resources and transportation as appropriate   Identify discharge learning needs (meds, wound care, etc)     Problem: Pain  Goal: Verbalizes/displays adequate comfort level or baseline comfort level  7/12/2025 1121 by Nichole Fisher RN  Outcome: Progressing  Flowsheets (Taken 7/12/2025 1121)  Verbalizes/displays adequate comfort level or baseline comfort level:   Encourage patient to monitor pain and request assistance   Assess pain using appropriate pain scale   Administer analgesics based on type and severity of pain and evaluate response     Problem: Arterial:  Goal: Optimize blood flow for wound healing  Description: Optimize blood flow for wound healing  7/12/2025 1121 by Nichole Fisher, RN  Outcome: Progressing

## 2025-07-12 NOTE — PLAN OF CARE
Problem: Chronic Conditions and Co-morbidities  Goal: Patient's chronic conditions and co-morbidity symptoms are monitored and maintained or improved  7/12/2025 0331 by Jhon Jc RN  Outcome: Progressing  7/12/2025 0330 by Jhon Jc RN  Outcome: Progressing     Problem: Discharge Planning  Goal: Discharge to home or other facility with appropriate resources  7/12/2025 0331 by Jhon Jc RN  Outcome: Progressing  7/12/2025 0330 by Jhon Jc RN  Outcome: Progressing     Problem: Pain  Goal: Verbalizes/displays adequate comfort level or baseline comfort level  7/12/2025 0331 by Jhon Jc RN  Outcome: Progressing  7/12/2025 0330 by Jhon Jc RN  Outcome: Progressing

## 2025-07-13 LAB
ANION GAP SERPL CALCULATED.3IONS-SCNC: 12 MMOL/L (ref 3–16)
BASOPHILS # BLD: 0.1 K/UL (ref 0–0.2)
BASOPHILS NFR BLD: 0.9 %
BUN SERPL-MCNC: 4 MG/DL (ref 7–20)
CALCIUM SERPL-MCNC: 8.8 MG/DL (ref 8.3–10.6)
CHLORIDE SERPL-SCNC: 104 MMOL/L (ref 99–110)
CO2 SERPL-SCNC: 22 MMOL/L (ref 21–32)
CREAT SERPL-MCNC: 0.7 MG/DL (ref 0.6–1.1)
DEPRECATED RDW RBC AUTO: 13.5 % (ref 12.4–15.4)
EOSINOPHIL # BLD: 0 K/UL (ref 0–0.6)
EOSINOPHIL NFR BLD: 0.2 %
EST. AVERAGE GLUCOSE BLD GHB EST-MCNC: 148.5 MG/DL
GFR SERPLBLD CREATININE-BSD FMLA CKD-EPI: >90 ML/MIN/{1.73_M2}
GLUCOSE BLD-MCNC: 113 MG/DL (ref 70–99)
GLUCOSE BLD-MCNC: 118 MG/DL (ref 70–99)
GLUCOSE BLD-MCNC: 173 MG/DL (ref 70–99)
GLUCOSE BLD-MCNC: 77 MG/DL (ref 70–99)
GLUCOSE SERPL-MCNC: 89 MG/DL (ref 70–99)
HBA1C MFR BLD: 6.8 %
HCT VFR BLD AUTO: 41.6 % (ref 36–48)
HGB BLD-MCNC: 14.1 G/DL (ref 12–16)
LYMPHOCYTES # BLD: 2.9 K/UL (ref 1–5.1)
LYMPHOCYTES NFR BLD: 24.9 %
MAGNESIUM SERPL-MCNC: 1.97 MG/DL (ref 1.8–2.4)
MCH RBC QN AUTO: 29.6 PG (ref 26–34)
MCHC RBC AUTO-ENTMCNC: 33.8 G/DL (ref 31–36)
MCV RBC AUTO: 87.6 FL (ref 80–100)
MONOCYTES # BLD: 0.8 K/UL (ref 0–1.3)
MONOCYTES NFR BLD: 7 %
NEUTROPHILS # BLD: 7.8 K/UL (ref 1.7–7.7)
NEUTROPHILS NFR BLD: 67 %
PERFORMED ON: ABNORMAL
PERFORMED ON: NORMAL
PLATELET # BLD AUTO: 390 K/UL (ref 135–450)
PMV BLD AUTO: 9.1 FL (ref 5–10.5)
POTASSIUM SERPL-SCNC: 3.4 MMOL/L (ref 3.5–5.1)
RBC # BLD AUTO: 4.74 M/UL (ref 4–5.2)
SODIUM SERPL-SCNC: 138 MMOL/L (ref 136–145)
WBC # BLD AUTO: 11.7 K/UL (ref 4–11)

## 2025-07-13 PROCEDURE — 36415 COLL VENOUS BLD VENIPUNCTURE: CPT

## 2025-07-13 PROCEDURE — 80048 BASIC METABOLIC PNL TOTAL CA: CPT

## 2025-07-13 PROCEDURE — 6360000002 HC RX W HCPCS: Performed by: INTERNAL MEDICINE

## 2025-07-13 PROCEDURE — 1200000000 HC SEMI PRIVATE

## 2025-07-13 PROCEDURE — 2500000003 HC RX 250 WO HCPCS: Performed by: INTERNAL MEDICINE

## 2025-07-13 PROCEDURE — 85025 COMPLETE CBC W/AUTO DIFF WBC: CPT

## 2025-07-13 PROCEDURE — 6370000000 HC RX 637 (ALT 250 FOR IP): Performed by: INTERNAL MEDICINE

## 2025-07-13 PROCEDURE — 2580000003 HC RX 258: Performed by: INTERNAL MEDICINE

## 2025-07-13 PROCEDURE — 6360000002 HC RX W HCPCS: Performed by: STUDENT IN AN ORGANIZED HEALTH CARE EDUCATION/TRAINING PROGRAM

## 2025-07-13 PROCEDURE — 83735 ASSAY OF MAGNESIUM: CPT

## 2025-07-13 RX ORDER — SODIUM CHLORIDE 9 MG/ML
INJECTION, SOLUTION INTRAVENOUS CONTINUOUS
Status: DISCONTINUED | OUTPATIENT
Start: 2025-07-13 | End: 2025-07-14

## 2025-07-13 RX ORDER — LORAZEPAM 2 MG/ML
0.5 CONCENTRATE ORAL EVERY 4 HOURS PRN
Status: DISCONTINUED | OUTPATIENT
Start: 2025-07-13 | End: 2025-07-16 | Stop reason: HOSPADM

## 2025-07-13 RX ORDER — LORAZEPAM 2 MG/ML
0.5 INJECTION INTRAMUSCULAR EVERY 4 HOURS PRN
Status: DISCONTINUED | OUTPATIENT
Start: 2025-07-13 | End: 2025-07-13

## 2025-07-13 RX ORDER — POTASSIUM CHLORIDE 7.45 MG/ML
10 INJECTION INTRAVENOUS
Status: COMPLETED | OUTPATIENT
Start: 2025-07-13 | End: 2025-07-13

## 2025-07-13 RX ADMIN — POTASSIUM CHLORIDE 10 MEQ: 7.46 INJECTION, SOLUTION INTRAVENOUS at 16:09

## 2025-07-13 RX ADMIN — POTASSIUM CHLORIDE 10 MEQ: 7.46 INJECTION, SOLUTION INTRAVENOUS at 15:05

## 2025-07-13 RX ADMIN — METOCLOPRAMIDE HYDROCHLORIDE 10 MG: 5 INJECTION INTRAMUSCULAR; INTRAVENOUS at 14:55

## 2025-07-13 RX ADMIN — POTASSIUM CHLORIDE 10 MEQ: 7.46 INJECTION, SOLUTION INTRAVENOUS at 13:50

## 2025-07-13 RX ADMIN — METOCLOPRAMIDE HYDROCHLORIDE 10 MG: 5 INJECTION INTRAMUSCULAR; INTRAVENOUS at 01:38

## 2025-07-13 RX ADMIN — METOCLOPRAMIDE HYDROCHLORIDE 10 MG: 5 INJECTION INTRAMUSCULAR; INTRAVENOUS at 08:47

## 2025-07-13 RX ADMIN — LORAZEPAM 0.5 MG: 2 INJECTION INTRAMUSCULAR; INTRAVENOUS at 10:44

## 2025-07-13 RX ADMIN — POTASSIUM CHLORIDE 10 MEQ: 7.46 INJECTION, SOLUTION INTRAVENOUS at 12:42

## 2025-07-13 RX ADMIN — Medication 0.5 MG: at 17:18

## 2025-07-13 RX ADMIN — SODIUM CHLORIDE: 0.9 INJECTION, SOLUTION INTRAVENOUS at 12:36

## 2025-07-13 RX ADMIN — METOCLOPRAMIDE HYDROCHLORIDE 10 MG: 5 INJECTION INTRAMUSCULAR; INTRAVENOUS at 20:11

## 2025-07-13 RX ADMIN — Medication 10 ML: at 08:47

## 2025-07-13 RX ADMIN — ONDANSETRON 4 MG: 2 INJECTION, SOLUTION INTRAMUSCULAR; INTRAVENOUS at 14:54

## 2025-07-13 RX ADMIN — ONDANSETRON 4 MG: 2 INJECTION, SOLUTION INTRAMUSCULAR; INTRAVENOUS at 08:47

## 2025-07-13 ASSESSMENT — PAIN SCALES - GENERAL
PAINLEVEL_OUTOF10: 0
PAINLEVEL_OUTOF10: 0

## 2025-07-13 NOTE — PLAN OF CARE
Problem: Chronic Conditions and Co-morbidities  Goal: Patient's chronic conditions and co-morbidity symptoms are monitored and maintained or improved  7/12/2025 2102 by Susan Mo RN  Outcome: Progressing  7/12/2025 1121 by Nichole Fisher RN  Outcome: Progressing  Flowsheets (Taken 7/12/2025 1121)  Care Plan - Patient's Chronic Conditions and Co-Morbidity Symptoms are Monitored and Maintained or Improved:   Monitor and assess patient's chronic conditions and comorbid symptoms for stability, deterioration, or improvement   Collaborate with multidisciplinary team to address chronic and comorbid conditions and prevent exacerbation or deterioration     Problem: Discharge Planning  Goal: Discharge to home or other facility with appropriate resources  7/12/2025 2102 by Susan Mo RN  Outcome: Progressing  7/12/2025 1121 by Nichole Fisher RN  Outcome: Progressing  Flowsheets (Taken 7/12/2025 1121)  Discharge to home or other facility with appropriate resources:   Identify barriers to discharge with patient and caregiver   Arrange for needed discharge resources and transportation as appropriate   Identify discharge learning needs (meds, wound care, etc)     Problem: Pain  Goal: Verbalizes/displays adequate comfort level or baseline comfort level  7/12/2025 2102 by Susan Mo RN  Outcome: Progressing  7/12/2025 1121 by Nichole Fisher RN  Outcome: Progressing  Flowsheets (Taken 7/12/2025 1121)  Verbalizes/displays adequate comfort level or baseline comfort level:   Encourage patient to monitor pain and request assistance   Assess pain using appropriate pain scale   Administer analgesics based on type and severity of pain and evaluate response     Problem: Arterial:  Goal: Optimize blood flow for wound healing  Description: Optimize blood flow for wound healing  7/12/2025 2102 by Susan Mo RN  Outcome: Progressing  7/12/2025 1121 by Nichole Fisher RN  Outcome: Progressing

## 2025-07-14 LAB
ANION GAP SERPL CALCULATED.3IONS-SCNC: 14 MMOL/L (ref 3–16)
BASOPHILS # BLD: 0.1 K/UL (ref 0–0.2)
BASOPHILS NFR BLD: 0.6 %
BUN SERPL-MCNC: 4 MG/DL (ref 7–20)
CALCIUM SERPL-MCNC: 8.9 MG/DL (ref 8.3–10.6)
CHLORIDE SERPL-SCNC: 102 MMOL/L (ref 99–110)
CO2 SERPL-SCNC: 20 MMOL/L (ref 21–32)
CREAT SERPL-MCNC: 0.7 MG/DL (ref 0.6–1.1)
DEPRECATED RDW RBC AUTO: 13.8 % (ref 12.4–15.4)
EOSINOPHIL # BLD: 0.1 K/UL (ref 0–0.6)
EOSINOPHIL NFR BLD: 0.5 %
GFR SERPLBLD CREATININE-BSD FMLA CKD-EPI: >90 ML/MIN/{1.73_M2}
GLUCOSE BLD-MCNC: 151 MG/DL (ref 70–99)
GLUCOSE BLD-MCNC: 193 MG/DL (ref 70–99)
GLUCOSE BLD-MCNC: 209 MG/DL (ref 70–99)
GLUCOSE BLD-MCNC: 42 MG/DL (ref 70–99)
GLUCOSE BLD-MCNC: 42 MG/DL (ref 70–99)
GLUCOSE BLD-MCNC: 84 MG/DL (ref 70–99)
GLUCOSE SERPL-MCNC: 149 MG/DL (ref 70–99)
HCT VFR BLD AUTO: 43 % (ref 36–48)
HGB BLD-MCNC: 14.5 G/DL (ref 12–16)
LYMPHOCYTES # BLD: 2 K/UL (ref 1–5.1)
LYMPHOCYTES NFR BLD: 19 %
MAGNESIUM SERPL-MCNC: 1.85 MG/DL (ref 1.8–2.4)
MCH RBC QN AUTO: 30 PG (ref 26–34)
MCHC RBC AUTO-ENTMCNC: 33.7 G/DL (ref 31–36)
MCV RBC AUTO: 88.9 FL (ref 80–100)
MONOCYTES # BLD: 0.7 K/UL (ref 0–1.3)
MONOCYTES NFR BLD: 6.4 %
NEUTROPHILS # BLD: 7.7 K/UL (ref 1.7–7.7)
NEUTROPHILS NFR BLD: 73.5 %
PERFORMED ON: ABNORMAL
PERFORMED ON: NORMAL
PLATELET # BLD AUTO: 370 K/UL (ref 135–450)
PMV BLD AUTO: 8.8 FL (ref 5–10.5)
POTASSIUM SERPL-SCNC: 3.3 MMOL/L (ref 3.5–5.1)
RBC # BLD AUTO: 4.84 M/UL (ref 4–5.2)
SODIUM SERPL-SCNC: 136 MMOL/L (ref 136–145)
WBC # BLD AUTO: 10.5 K/UL (ref 4–11)

## 2025-07-14 PROCEDURE — 6360000002 HC RX W HCPCS: Performed by: INTERNAL MEDICINE

## 2025-07-14 PROCEDURE — 2500000003 HC RX 250 WO HCPCS: Performed by: INTERNAL MEDICINE

## 2025-07-14 PROCEDURE — 2580000003 HC RX 258: Performed by: INTERNAL MEDICINE

## 2025-07-14 PROCEDURE — 36415 COLL VENOUS BLD VENIPUNCTURE: CPT

## 2025-07-14 PROCEDURE — 85025 COMPLETE CBC W/AUTO DIFF WBC: CPT

## 2025-07-14 PROCEDURE — 80048 BASIC METABOLIC PNL TOTAL CA: CPT

## 2025-07-14 PROCEDURE — 6360000002 HC RX W HCPCS: Performed by: STUDENT IN AN ORGANIZED HEALTH CARE EDUCATION/TRAINING PROGRAM

## 2025-07-14 PROCEDURE — 1200000000 HC SEMI PRIVATE

## 2025-07-14 PROCEDURE — 83735 ASSAY OF MAGNESIUM: CPT

## 2025-07-14 RX ORDER — HYDRALAZINE HYDROCHLORIDE 20 MG/ML
10 INJECTION INTRAMUSCULAR; INTRAVENOUS EVERY 6 HOURS PRN
Status: DISCONTINUED | OUTPATIENT
Start: 2025-07-14 | End: 2025-07-16 | Stop reason: HOSPADM

## 2025-07-14 RX ORDER — PANTOPRAZOLE SODIUM 40 MG/10ML
40 INJECTION, POWDER, LYOPHILIZED, FOR SOLUTION INTRAVENOUS DAILY
Status: DISCONTINUED | OUTPATIENT
Start: 2025-07-14 | End: 2025-07-16 | Stop reason: HOSPADM

## 2025-07-14 RX ORDER — SODIUM CHLORIDE 9 MG/ML
INJECTION, SOLUTION INTRAVENOUS CONTINUOUS
Status: DISCONTINUED | OUTPATIENT
Start: 2025-07-14 | End: 2025-07-16

## 2025-07-14 RX ORDER — DEXTROSE MONOHYDRATE 50 MG/ML
INJECTION, SOLUTION INTRAVENOUS CONTINUOUS
Status: DISCONTINUED | OUTPATIENT
Start: 2025-07-14 | End: 2025-07-14

## 2025-07-14 RX ORDER — POTASSIUM CHLORIDE 7.45 MG/ML
10 INJECTION INTRAVENOUS
Status: COMPLETED | OUTPATIENT
Start: 2025-07-14 | End: 2025-07-14

## 2025-07-14 RX ADMIN — SODIUM CHLORIDE: 0.9 INJECTION, SOLUTION INTRAVENOUS at 01:59

## 2025-07-14 RX ADMIN — ONDANSETRON 4 MG: 2 INJECTION, SOLUTION INTRAMUSCULAR; INTRAVENOUS at 15:21

## 2025-07-14 RX ADMIN — SODIUM CHLORIDE: 0.9 INJECTION, SOLUTION INTRAVENOUS at 12:27

## 2025-07-14 RX ADMIN — METOCLOPRAMIDE HYDROCHLORIDE 10 MG: 5 INJECTION INTRAMUSCULAR; INTRAVENOUS at 02:00

## 2025-07-14 RX ADMIN — PANTOPRAZOLE SODIUM 40 MG: 40 INJECTION, POWDER, FOR SOLUTION INTRAVENOUS at 11:22

## 2025-07-14 RX ADMIN — POTASSIUM CHLORIDE 10 MEQ: 7.46 INJECTION, SOLUTION INTRAVENOUS at 16:04

## 2025-07-14 RX ADMIN — POTASSIUM CHLORIDE 10 MEQ: 7.46 INJECTION, SOLUTION INTRAVENOUS at 15:02

## 2025-07-14 RX ADMIN — Medication 10 ML: at 07:49

## 2025-07-14 RX ADMIN — POTASSIUM CHLORIDE 10 MEQ: 7.46 INJECTION, SOLUTION INTRAVENOUS at 13:00

## 2025-07-14 RX ADMIN — ONDANSETRON 4 MG: 2 INJECTION, SOLUTION INTRAMUSCULAR; INTRAVENOUS at 07:49

## 2025-07-14 RX ADMIN — SODIUM CHLORIDE: 0.9 INJECTION, SOLUTION INTRAVENOUS at 18:12

## 2025-07-14 RX ADMIN — POTASSIUM CHLORIDE 10 MEQ: 7.46 INJECTION, SOLUTION INTRAVENOUS at 14:01

## 2025-07-14 RX ADMIN — SODIUM CHLORIDE: 0.9 INJECTION, SOLUTION INTRAVENOUS at 13:00

## 2025-07-14 ASSESSMENT — PAIN SCALES - GENERAL
PAINLEVEL_OUTOF10: 0
PAINLEVEL_OUTOF10: 0

## 2025-07-14 NOTE — PLAN OF CARE
Problem: Chronic Conditions and Co-morbidities  Goal: Patient's chronic conditions and co-morbidity symptoms are monitored and maintained or improved  7/13/2025 2301 by Susan Mo RN  Outcome: Progressing  7/13/2025 1543 by Carolyn Richmond RN  Outcome: Progressing     Problem: Discharge Planning  Goal: Discharge to home or other facility with appropriate resources  7/13/2025 2301 by Susan Mo RN  Outcome: Progressing  7/13/2025 1543 by Carolyn Richmond RN  Outcome: Progressing     Problem: Pain  Goal: Verbalizes/displays adequate comfort level or baseline comfort level  7/13/2025 2301 by Susan Mo RN  Outcome: Progressing  7/13/2025 1543 by Carolyn Richmond RN  Outcome: Progressing     Problem: Arterial:  Goal: Optimize blood flow for wound healing  Description: Optimize blood flow for wound healing  7/13/2025 2301 by Susan Mo RN  Outcome: Progressing  7/13/2025 1543 by Carolyn Richmond RN  Outcome: Progressing

## 2025-07-14 NOTE — CARE COORDINATION
Case Management Assessment  Initial Evaluation    Date/Time of Evaluation: 7/14/2025 1:41 PM  Assessment Completed by: Jyoti Phillip RN    If patient is discharged prior to next notation, then this note serves as note for discharge by case management.    Patient Name: Rossy Olvera                   YOB: 2000  Diagnosis: Dehydration [E86.0]  Hypokalemia [E87.6]  Hypomagnesemia [E83.42]  Intractable nausea and vomiting [R11.2]  Nausea and vomiting, unspecified vomiting type [R11.2]                   Date / Time: 7/11/2025  8:34 AM    Patient Admission Status: Inpatient   Readmission Risk (Low < 19, Mod (19-27), High > 27): Readmission Risk Score: 9    Current PCP: Leny Cervantes MD  PCP verified by CM? Yes (looking for new PCP)    Chart Reviewed: Yes      History Provided by: Patient  Patient Orientation: Alert and Oriented, Person, Place, Situation, Self    Patient Cognition: Alert    Hospitalization in the last 30 days (Readmission):  No    If yes, Readmission Assessment in  Navigator will be completed.    Advance Directives:      Code Status: Full Code   Patient's Primary Decision Maker is: Legal Next of Kin    Primary Decision Maker: Jonna Olvera - [phone]    Discharge Planning:    Patient lives with: Parent Type of Home: House  Primary Care Giver: Self  Patient Support Systems include: Parent   Current Financial resources: None  Current community resources: None  Current services prior to admission: None            Current DME:              Type of Home Care services:  None    ADLS  Prior functional level: Independent in ADLs/IADLs  Current functional level: Independent in ADLs/IADLs    PT AM-PAC:   /24  OT AM-PAC:   /24    Family can provide assistance at DC: Yes  Would you like Case Management to discuss the discharge plan with any other family members/significant others, and if so, who? Yes (mother)  Plans to Return to Present Housing: Yes  Other Identified Issues/Barriers to

## 2025-07-15 ENCOUNTER — ANESTHESIA EVENT (OUTPATIENT)
Dept: ENDOSCOPY | Age: 25
DRG: 074 | End: 2025-07-15
Payer: COMMERCIAL

## 2025-07-15 ENCOUNTER — ANESTHESIA (OUTPATIENT)
Dept: ENDOSCOPY | Age: 25
DRG: 074 | End: 2025-07-15
Payer: COMMERCIAL

## 2025-07-15 LAB
ALBUMIN SERPL-MCNC: 4.2 G/DL (ref 3.4–5)
ALP SERPL-CCNC: 64 U/L (ref 40–129)
ALT SERPL-CCNC: 21 U/L (ref 10–40)
AMPHETAMINES UR QL SCN>1000 NG/ML: ABNORMAL
ANION GAP SERPL CALCULATED.3IONS-SCNC: 15 MMOL/L (ref 3–16)
AST SERPL-CCNC: 17 U/L (ref 15–37)
BARBITURATES UR QL SCN>200 NG/ML: ABNORMAL
BASOPHILS # BLD: 0.1 K/UL (ref 0–0.2)
BASOPHILS NFR BLD: 0.9 %
BENZODIAZ UR QL SCN>200 NG/ML: ABNORMAL
BILIRUB DIRECT SERPL-MCNC: 0.5 MG/DL (ref 0–0.3)
BILIRUB INDIRECT SERPL-MCNC: 0.5 MG/DL (ref 0–1)
BILIRUB SERPL-MCNC: 1 MG/DL (ref 0–1)
BUN SERPL-MCNC: 7 MG/DL (ref 7–20)
CALCIUM SERPL-MCNC: 9 MG/DL (ref 8.3–10.6)
CANNABINOIDS UR QL SCN>50 NG/ML: POSITIVE
CHLORIDE SERPL-SCNC: 102 MMOL/L (ref 99–110)
CO2 SERPL-SCNC: 19 MMOL/L (ref 21–32)
COCAINE UR QL SCN: ABNORMAL
CREAT SERPL-MCNC: 0.8 MG/DL (ref 0.6–1.1)
DEPRECATED RDW RBC AUTO: 13.7 % (ref 12.4–15.4)
DRUG SCREEN COMMENT UR-IMP: ABNORMAL
EOSINOPHIL # BLD: 0 K/UL (ref 0–0.6)
EOSINOPHIL NFR BLD: 0.4 %
FENTANYL SCREEN, URINE: ABNORMAL
GFR SERPLBLD CREATININE-BSD FMLA CKD-EPI: >90 ML/MIN/{1.73_M2}
GLUCOSE BLD-MCNC: 155 MG/DL (ref 70–99)
GLUCOSE BLD-MCNC: 166 MG/DL (ref 70–99)
GLUCOSE BLD-MCNC: 201 MG/DL (ref 70–99)
GLUCOSE BLD-MCNC: 243 MG/DL (ref 70–99)
GLUCOSE BLD-MCNC: 267 MG/DL (ref 70–99)
GLUCOSE SERPL-MCNC: 259 MG/DL (ref 70–99)
HCG UR QL: NEGATIVE
HCT VFR BLD AUTO: 43.5 % (ref 36–48)
HGB BLD-MCNC: 14.8 G/DL (ref 12–16)
LYMPHOCYTES # BLD: 1.8 K/UL (ref 1–5.1)
LYMPHOCYTES NFR BLD: 15.6 %
MCH RBC QN AUTO: 29.8 PG (ref 26–34)
MCHC RBC AUTO-ENTMCNC: 34.1 G/DL (ref 31–36)
MCV RBC AUTO: 87.4 FL (ref 80–100)
METHADONE UR QL SCN>300 NG/ML: ABNORMAL
MONOCYTES # BLD: 0.7 K/UL (ref 0–1.3)
MONOCYTES NFR BLD: 6 %
NEUTROPHILS # BLD: 9.1 K/UL (ref 1.7–7.7)
NEUTROPHILS NFR BLD: 77.1 %
OPIATES UR QL SCN>300 NG/ML: ABNORMAL
OXYCODONE UR QL SCN: ABNORMAL
PCP UR QL SCN>25 NG/ML: ABNORMAL
PERFORMED ON: ABNORMAL
PH UR STRIP: 6 [PH]
PLATELET # BLD AUTO: 443 K/UL (ref 135–450)
PMV BLD AUTO: 8.5 FL (ref 5–10.5)
POTASSIUM SERPL-SCNC: 4 MMOL/L (ref 3.5–5.1)
PROT SERPL-MCNC: 6.6 G/DL (ref 6.4–8.2)
RBC # BLD AUTO: 4.97 M/UL (ref 4–5.2)
SODIUM SERPL-SCNC: 136 MMOL/L (ref 136–145)
WBC # BLD AUTO: 11.8 K/UL (ref 4–11)

## 2025-07-15 PROCEDURE — 3609017100 HC EGD: Performed by: INTERNAL MEDICINE

## 2025-07-15 PROCEDURE — 7100000011 HC PHASE II RECOVERY - ADDTL 15 MIN: Performed by: INTERNAL MEDICINE

## 2025-07-15 PROCEDURE — 2500000003 HC RX 250 WO HCPCS: Performed by: INTERNAL MEDICINE

## 2025-07-15 PROCEDURE — 6360000002 HC RX W HCPCS: Performed by: NURSE ANESTHETIST, CERTIFIED REGISTERED

## 2025-07-15 PROCEDURE — 6360000002 HC RX W HCPCS: Performed by: INTERNAL MEDICINE

## 2025-07-15 PROCEDURE — 6360000002 HC RX W HCPCS: Performed by: STUDENT IN AN ORGANIZED HEALTH CARE EDUCATION/TRAINING PROGRAM

## 2025-07-15 PROCEDURE — 0DJ08ZZ INSPECTION OF UPPER INTESTINAL TRACT, VIA NATURAL OR ARTIFICIAL OPENING ENDOSCOPIC: ICD-10-PCS | Performed by: INTERNAL MEDICINE

## 2025-07-15 PROCEDURE — 80048 BASIC METABOLIC PNL TOTAL CA: CPT

## 2025-07-15 PROCEDURE — 3700000000 HC ANESTHESIA ATTENDED CARE: Performed by: INTERNAL MEDICINE

## 2025-07-15 PROCEDURE — 80076 HEPATIC FUNCTION PANEL: CPT

## 2025-07-15 PROCEDURE — 2580000003 HC RX 258: Performed by: INTERNAL MEDICINE

## 2025-07-15 PROCEDURE — 2709999900 HC NON-CHARGEABLE SUPPLY: Performed by: INTERNAL MEDICINE

## 2025-07-15 PROCEDURE — 7100000010 HC PHASE II RECOVERY - FIRST 15 MIN: Performed by: INTERNAL MEDICINE

## 2025-07-15 PROCEDURE — 80307 DRUG TEST PRSMV CHEM ANLYZR: CPT

## 2025-07-15 PROCEDURE — 1200000000 HC SEMI PRIVATE

## 2025-07-15 PROCEDURE — 36415 COLL VENOUS BLD VENIPUNCTURE: CPT

## 2025-07-15 PROCEDURE — 85025 COMPLETE CBC W/AUTO DIFF WBC: CPT

## 2025-07-15 PROCEDURE — 84703 CHORIONIC GONADOTROPIN ASSAY: CPT

## 2025-07-15 RX ORDER — SODIUM CHLORIDE 0.9 % (FLUSH) 0.9 %
5-40 SYRINGE (ML) INJECTION PRN
Status: DISCONTINUED | OUTPATIENT
Start: 2025-07-15 | End: 2025-07-15 | Stop reason: HOSPADM

## 2025-07-15 RX ORDER — PROPOFOL 10 MG/ML
INJECTION, EMULSION INTRAVENOUS
Status: DISCONTINUED | OUTPATIENT
Start: 2025-07-15 | End: 2025-07-15 | Stop reason: SDUPTHER

## 2025-07-15 RX ORDER — SODIUM CHLORIDE 0.9 % (FLUSH) 0.9 %
5-40 SYRINGE (ML) INJECTION PRN
Status: DISCONTINUED | OUTPATIENT
Start: 2025-07-15 | End: 2025-07-15

## 2025-07-15 RX ORDER — LIDOCAINE HYDROCHLORIDE 10 MG/ML
1 INJECTION, SOLUTION EPIDURAL; INFILTRATION; INTRACAUDAL; PERINEURAL
Status: DISCONTINUED | OUTPATIENT
Start: 2025-07-15 | End: 2025-07-15 | Stop reason: HOSPADM

## 2025-07-15 RX ORDER — SODIUM CHLORIDE, SODIUM LACTATE, POTASSIUM CHLORIDE, CALCIUM CHLORIDE 600; 310; 30; 20 MG/100ML; MG/100ML; MG/100ML; MG/100ML
INJECTION, SOLUTION INTRAVENOUS CONTINUOUS
Status: DISCONTINUED | OUTPATIENT
Start: 2025-07-15 | End: 2025-07-15 | Stop reason: HOSPADM

## 2025-07-15 RX ORDER — MIDAZOLAM HYDROCHLORIDE 1 MG/ML
2 INJECTION, SOLUTION INTRAMUSCULAR; INTRAVENOUS
Status: DISCONTINUED | OUTPATIENT
Start: 2025-07-15 | End: 2025-07-15 | Stop reason: HOSPADM

## 2025-07-15 RX ORDER — SODIUM CHLORIDE 0.9 % (FLUSH) 0.9 %
5-40 SYRINGE (ML) INJECTION EVERY 12 HOURS SCHEDULED
Status: DISCONTINUED | OUTPATIENT
Start: 2025-07-15 | End: 2025-07-15 | Stop reason: HOSPADM

## 2025-07-15 RX ORDER — LABETALOL HYDROCHLORIDE 5 MG/ML
10 INJECTION, SOLUTION INTRAVENOUS
Status: DISCONTINUED | OUTPATIENT
Start: 2025-07-15 | End: 2025-07-15

## 2025-07-15 RX ORDER — SODIUM CHLORIDE 9 MG/ML
INJECTION, SOLUTION INTRAVENOUS PRN
Status: DISCONTINUED | OUTPATIENT
Start: 2025-07-15 | End: 2025-07-15

## 2025-07-15 RX ORDER — PROCHLORPERAZINE EDISYLATE 5 MG/ML
5 INJECTION INTRAMUSCULAR; INTRAVENOUS ONCE
Status: COMPLETED | OUTPATIENT
Start: 2025-07-15 | End: 2025-07-15

## 2025-07-15 RX ORDER — DROPERIDOL 2.5 MG/ML
0.62 INJECTION, SOLUTION INTRAMUSCULAR; INTRAVENOUS
Status: DISCONTINUED | OUTPATIENT
Start: 2025-07-15 | End: 2025-07-15

## 2025-07-15 RX ORDER — DIPHENHYDRAMINE HYDROCHLORIDE 50 MG/ML
12.5 INJECTION, SOLUTION INTRAMUSCULAR; INTRAVENOUS
Status: DISCONTINUED | OUTPATIENT
Start: 2025-07-15 | End: 2025-07-15

## 2025-07-15 RX ORDER — METOCLOPRAMIDE HYDROCHLORIDE 5 MG/ML
10 INJECTION INTRAMUSCULAR; INTRAVENOUS EVERY 8 HOURS
Status: DISCONTINUED | OUTPATIENT
Start: 2025-07-15 | End: 2025-07-16 | Stop reason: HOSPADM

## 2025-07-15 RX ORDER — OXYCODONE HYDROCHLORIDE 5 MG/1
5 TABLET ORAL PRN
Status: DISCONTINUED | OUTPATIENT
Start: 2025-07-15 | End: 2025-07-15

## 2025-07-15 RX ORDER — SODIUM CHLORIDE 0.9 % (FLUSH) 0.9 %
5-40 SYRINGE (ML) INJECTION EVERY 12 HOURS SCHEDULED
Status: DISCONTINUED | OUTPATIENT
Start: 2025-07-15 | End: 2025-07-15

## 2025-07-15 RX ORDER — IPRATROPIUM BROMIDE AND ALBUTEROL SULFATE 2.5; .5 MG/3ML; MG/3ML
1 SOLUTION RESPIRATORY (INHALATION)
Status: DISCONTINUED | OUTPATIENT
Start: 2025-07-15 | End: 2025-07-15

## 2025-07-15 RX ORDER — OXYCODONE HYDROCHLORIDE 5 MG/1
10 TABLET ORAL PRN
Status: DISCONTINUED | OUTPATIENT
Start: 2025-07-15 | End: 2025-07-15

## 2025-07-15 RX ORDER — ONDANSETRON 2 MG/ML
4 INJECTION INTRAMUSCULAR; INTRAVENOUS
Status: DISCONTINUED | OUTPATIENT
Start: 2025-07-15 | End: 2025-07-15

## 2025-07-15 RX ORDER — SODIUM CHLORIDE 9 MG/ML
INJECTION, SOLUTION INTRAVENOUS PRN
Status: DISCONTINUED | OUTPATIENT
Start: 2025-07-15 | End: 2025-07-15 | Stop reason: HOSPADM

## 2025-07-15 RX ADMIN — SODIUM CHLORIDE: 0.9 INJECTION, SOLUTION INTRAVENOUS at 09:20

## 2025-07-15 RX ADMIN — PROPOFOL 250 MG: 10 INJECTION, EMULSION INTRAVENOUS at 12:32

## 2025-07-15 RX ADMIN — SODIUM CHLORIDE: 0.9 INJECTION, SOLUTION INTRAVENOUS at 14:36

## 2025-07-15 RX ADMIN — Medication 10 ML: at 07:35

## 2025-07-15 RX ADMIN — PANTOPRAZOLE SODIUM 40 MG: 40 INJECTION, POWDER, FOR SOLUTION INTRAVENOUS at 07:35

## 2025-07-15 RX ADMIN — PROCHLORPERAZINE EDISYLATE 5 MG: 5 INJECTION INTRAMUSCULAR; INTRAVENOUS at 11:50

## 2025-07-15 RX ADMIN — METOCLOPRAMIDE HYDROCHLORIDE 10 MG: 5 INJECTION INTRAMUSCULAR; INTRAVENOUS at 22:01

## 2025-07-15 RX ADMIN — METOCLOPRAMIDE HYDROCHLORIDE 10 MG: 5 INJECTION INTRAMUSCULAR; INTRAVENOUS at 14:36

## 2025-07-15 RX ADMIN — ONDANSETRON 4 MG: 2 INJECTION, SOLUTION INTRAMUSCULAR; INTRAVENOUS at 07:35

## 2025-07-15 ASSESSMENT — PAIN SCALES - GENERAL
PAINLEVEL_OUTOF10: 0

## 2025-07-15 NOTE — ANESTHESIA POSTPROCEDURE EVALUATION
Department of Anesthesiology  Postprocedure Note    Patient: Rossy Olvera  MRN: [id number]  YOB: 2000  Date of evaluation: 7/15/2025    Procedure Summary       Date: 07/15/25 Room / Location: [place] [address] / [facility]    Anesthesia Start: 1229 Anesthesia Stop: 1241    Procedure: ESOPHAGOGASTRODUODENOSCOPY Diagnosis:       Nausea and vomiting, unspecified vomiting type      (Nausea and vomiting, unspecified vomiting type [R11.2])    Surgeons: Jolynn Cantu MD Responsible Provider: Lj Real MD    Anesthesia Type: MAC ASA Status: 2            Anesthesia Type: No value filed.    Andrei Phase I: Andrei Score: 10    Andrei Phase II: Andrei Score: 10    Anesthesia Post Evaluation    Comments: Postoperative Anesthesia Note    Name:    Rossy Olvera  MRN:      [id number]    Patient Vitals in the past 12 hrs:  07/15/25 1434, BP:138/82, Temp:98.4 °F (36.9 °C), Temp src:Oral, Pulse:63, Resp:16, SpO2:97 %  07/15/25 1315, BP:115/71, Pulse:60, Resp:16, SpO2:98 %  07/15/25 1255, BP:125/61, Temp:98.3 °F (36.8 °C), Temp src:Tympanic, Pulse:59, Resp:16, SpO2:96 %  07/15/25 1241, BP:(!) 111/53, Pulse:53, Resp:16, SpO2:96 %  07/15/25 1144, BP:(!) 168/97, Temp:97.9 °F (36.6 °C), Temp src:Oral, Pulse:56, Resp:16, SpO2:97 %  07/15/25 0734, BP:(!) 176/99, Temp:97.4 °F (36.3 °C), Temp src:Oral, Pulse:72, Resp:16, SpO2:97 %  07/15/25 0600, Weight:76.4 kg (168 lb 6.4 oz)     LABS:    CBC  Lab Results       Component                Value               Date/Time                  WBC                      11.8 (H)            07/15/2025 09:38 AM        HGB                      14.8                07/15/2025 09:38 AM        HCT                      43.5                07/15/2025 09:38 AM        PLT                      443                 07/15/2025 09:38 AM   RENAL  Lab Results       Component                Value               Date/Time                  NA                       136

## 2025-07-15 NOTE — PLAN OF CARE
Problem: Chronic Conditions and Co-morbidities  Goal: Patient's chronic conditions and co-morbidity symptoms are monitored and maintained or improved  7/14/2025 2025 by Marycruz Starkey, RN  Outcome: Progressing  7/14/2025 1609 by Lexi Jacobo, RN  Outcome: Progressing

## 2025-07-15 NOTE — H&P
Gastroenterology Preop Assessment    Patient:   Rossy Olvera   :    2000   Facility:   [facility]  Referring/PCP: Leny Cervantes MD  Date:     7/15/2025    Subjective:   Procedure: EGD    HPI/Reason for procedure:  Nausea/vomiting    Past Medical History:   Diagnosis Date    Abdominal pain     ADHD     Back pain     Cannabis hyperemesis syndrome concurrent with and due to cannabis abuse (HCC)     Diabetes mellitus (HCC)      Past Surgical History:   Procedure Laterality Date    ESOPHAGOGASTRODUODENOSCOPY      TONSILLECTOMY         Social:   Social History     Tobacco Use    Smoking status: Never    Smokeless tobacco: Never   Substance Use Topics    Alcohol use: Yes     Comment: rarely     Family: History reviewed. No pertinent family history.    Scheduled Medications:    sodium chloride flush  5-40 mL IntraVENous 2 times per day    pantoprazole  40 mg IntraVENous Daily    sodium chloride flush  5-40 mL IntraVENous 2 times per day    enoxaparin  40 mg SubCUTAneous Daily       Current Medications:    Prior to Admission medications    Medication Sig Start Date End Date Taking? Authorizing Provider   ondansetron (ZOFRAN-ODT) 4 MG disintegrating tablet Take 1 tablet by mouth 3 times daily as needed for Nausea or Vomiting 7/10/25  Yes Arline Turpin MD   cloNIDine (CATAPRES) 0.2 MG tablet Take 1 tablet by mouth nightly as needed   Yes Provider, MD Rocco   HUMALOG 100 UNIT/ML injection vial Insulin pump w/ continuous monitor 20  Yes ProviderRocco MD   dicyclomine (BENTYL) 10 MG capsule Take 1 capsule by mouth 4 times daily  Patient not taking: Reported on 2025   Rajiv Jaime PA   Levonorgest-Eth Estrad -Day 0.15-0.03 &0.01 MG TABS Take 1 tablet by mouth daily 22   ProviderRocco MD   metoclopramide (REGLAN) 10 MG tablet Take 1 tablet by mouth 3 times daily as needed (nausea/vomiting) 22  Chad Ibarra MD         Current

## 2025-07-15 NOTE — ANESTHESIA PRE PROCEDURE
PREGTESTUR Negative 07/09/2025    PREGSERUM Negative 07/11/2025    HCGQUANT <5.0 08/20/2023        ABGs: No results found for: \"PHART\", \"PO2ART\", \"AOD0ERM\", \"KFI9VLP\", \"BEART\", \"I3GVHENJ\"     Type & Screen (If Applicable):  No results found for: \"ABORH\", \"LABANTI\"    Drug/Infectious Status (If Applicable):  No results found for: \"HIV\", \"HEPCAB\"    COVID-19 Screening (If Applicable):   Lab Results   Component Value Date/Time    COVID19 NOT DETECTED 08/14/2024 10:55 AM           Anesthesia Evaluation  Patient summary reviewed and Nursing notes reviewed   no history of anesthetic complications:   Airway: Mallampati: II  TM distance: >3 FB   Neck ROM: full  Mouth opening: > = 3 FB   Dental: normal exam         Pulmonary:Negative Pulmonary ROS and normal exam                              ROS comment: Marijuana use   Cardiovascular:Negative CV ROS  Exercise tolerance: good (>4 METS)        ECG reviewed                        Neuro/Psych:   Negative Neuro/Psych ROS  (+) psychiatric history (ADHD):depression/anxiety             GI/Hepatic/Renal: Neg GI/Hepatic/Renal ROS       (-) hiatal hernia and GERD      ROS comment: Nausea.   Endo/Other: Negative Endo/Other ROS   (+) DiabetesType I DM, using insulin.                 Abdominal:             Vascular: negative vascular ROS.         Other Findings:        EKG 7/10/25    Sinus bradycardiaOtherwise normal ECGWhen compared with ECG of 09-JUL-2025 20:55,No significant change was foundConfirmed by JOSHUA THAO ([id number]) on 7/11/2025 12:12:08 PM        Anesthesia Plan      MAC     ASA 2     (Risks, benefits, and alternatives to MAC anesthesia discussed with patient. Questions answered. Patient willing to proceed. )  Induction: intravenous.      Anesthetic plan and risks discussed with patient.      Plan discussed with CRNA.                    Lj Real MD   7/15/2025

## 2025-07-15 NOTE — CONSULTS
Consult placed to [facility] at [phone]  
medical, family, and social history sections including the medications and allergies listed in the above medical record.        Electronically signed by: FIDENCIO Franklin 7/15/2025 7:38 AM           (Office) [phone]  (Fax) [phone]  Available via perfect serve

## 2025-07-15 NOTE — OP NOTE
Esophagogastroduodenoscopy Note    Patient:   Rossy Olvera    YOB: 2000    Facility:     [facility]  [address] [facility] [address]   [Inpatient]   Referring/PCP: Leny Cervantes MD    Procedure:   Esophagogastroduodenoscopy --diagnostic  Date:     7/15/2025   Endoscopist:  Jolynn Cantu MD     Preoperative Diagnosis:   Nausea/vomiting    Postoperative Diagnosis:  Normal    Anesthesia:  Propofol per anesthesia    Estimated blood loss: Minimal    Complications: None    Description of Procedure:  Informed consent was obtained from the patient after explanation of the procedure including indications, description of the procedure,  benefits and possible risks and complications of the procedure, and alternatives. Questions were answered.  The patient's history was reviewed and a directed physical examination was performed prior to the procedure.    Patient was monitored throughout the procedure with pulse oximetry and periodic assessment of vital signs. Patient was sedated as noted above. The Nursing staff and I performed a time out.  With the patient in the left lateral decubitus position, the Olympus videoendoscope was placed in the patient's mouth and under direct visualization passed into the esophagus. The scope was ultimately passed to the second portion of the duodenum.  Visualization was performed during both introduction and withdrawal of the endoscope and retroflexed view of the proximal stomach was obtained.     Findings::   Esophagus: normal. The findings do not support a diagnosis of Fierro's Esophagus.  Stomach: Normal. There was a moderate amount of liquid pooling within the gastric cavity that was suctioned through the scope.   Duodenum: normal    Recommendations:   -Start scheduled Reglan TID unless the patient has hx of adverse events with that in the past  -Gastroparesis diet  -Strict Glucose control  -Recommend

## 2025-07-16 VITALS
SYSTOLIC BLOOD PRESSURE: 165 MMHG | RESPIRATION RATE: 16 BRPM | HEIGHT: 62 IN | HEART RATE: 59 BPM | DIASTOLIC BLOOD PRESSURE: 87 MMHG | BODY MASS INDEX: 31.06 KG/M2 | WEIGHT: 168.8 LBS | TEMPERATURE: 97.8 F | OXYGEN SATURATION: 98 %

## 2025-07-16 LAB
ANION GAP SERPL CALCULATED.3IONS-SCNC: 13 MMOL/L (ref 3–16)
BUN SERPL-MCNC: 7 MG/DL (ref 7–20)
CALCIUM SERPL-MCNC: 8.5 MG/DL (ref 8.3–10.6)
CHLORIDE SERPL-SCNC: 100 MMOL/L (ref 99–110)
CO2 SERPL-SCNC: 21 MMOL/L (ref 21–32)
CREAT SERPL-MCNC: 0.7 MG/DL (ref 0.6–1.1)
GFR SERPLBLD CREATININE-BSD FMLA CKD-EPI: >90 ML/MIN/{1.73_M2}
GLUCOSE BLD-MCNC: 173 MG/DL (ref 70–99)
GLUCOSE BLD-MCNC: 214 MG/DL (ref 70–99)
GLUCOSE SERPL-MCNC: 208 MG/DL (ref 70–99)
MAGNESIUM SERPL-MCNC: 1.79 MG/DL (ref 1.8–2.4)
PERFORMED ON: ABNORMAL
PERFORMED ON: ABNORMAL
POTASSIUM SERPL-SCNC: 3.5 MMOL/L (ref 3.5–5.1)
SODIUM SERPL-SCNC: 134 MMOL/L (ref 136–145)

## 2025-07-16 PROCEDURE — 83735 ASSAY OF MAGNESIUM: CPT

## 2025-07-16 PROCEDURE — 2580000003 HC RX 258: Performed by: INTERNAL MEDICINE

## 2025-07-16 PROCEDURE — 6360000002 HC RX W HCPCS: Performed by: INTERNAL MEDICINE

## 2025-07-16 PROCEDURE — 2500000003 HC RX 250 WO HCPCS: Performed by: INTERNAL MEDICINE

## 2025-07-16 PROCEDURE — 80048 BASIC METABOLIC PNL TOTAL CA: CPT

## 2025-07-16 PROCEDURE — 36415 COLL VENOUS BLD VENIPUNCTURE: CPT

## 2025-07-16 RX ORDER — METOCLOPRAMIDE 10 MG/1
10 TABLET ORAL 3 TIMES DAILY PRN
Qty: 42 TABLET | Refills: 0 | Status: SHIPPED | OUTPATIENT
Start: 2025-07-16 | End: 2025-07-30

## 2025-07-16 RX ORDER — MAGNESIUM SULFATE 1 G/100ML
1000 INJECTION INTRAVENOUS ONCE
Status: COMPLETED | OUTPATIENT
Start: 2025-07-16 | End: 2025-07-16

## 2025-07-16 RX ADMIN — MAGNESIUM SULFATE HEPTAHYDRATE 1000 MG: 1 INJECTION, SOLUTION INTRAVENOUS at 12:11

## 2025-07-16 RX ADMIN — METOCLOPRAMIDE HYDROCHLORIDE 10 MG: 5 INJECTION INTRAMUSCULAR; INTRAVENOUS at 12:09

## 2025-07-16 RX ADMIN — Medication 10 ML: at 12:00

## 2025-07-16 RX ADMIN — SODIUM CHLORIDE: 0.9 INJECTION, SOLUTION INTRAVENOUS at 05:00

## 2025-07-16 RX ADMIN — PANTOPRAZOLE SODIUM 40 MG: 40 INJECTION, POWDER, FOR SOLUTION INTRAVENOUS at 12:00

## 2025-07-16 RX ADMIN — METOCLOPRAMIDE HYDROCHLORIDE 10 MG: 5 INJECTION INTRAMUSCULAR; INTRAVENOUS at 05:00

## 2025-07-16 NOTE — PLAN OF CARE
Problem: Chronic Conditions and Co-morbidities  Goal: Patient's chronic conditions and co-morbidity symptoms are monitored and maintained or improved  7/15/2025 2041 by Rosalba Ledbetter RN  Outcome: Progressing  Flowsheets (Taken 7/15/2025 1623)  Care Plan - Patient's Chronic Conditions and Co-Morbidity Symptoms are Monitored and Maintained or Improved: Monitor and assess patient's chronic conditions and comorbid symptoms for stability, deterioration, or improvement  7/15/2025 1120 by Lexi Jacobo RN  Outcome: Progressing     Problem: Discharge Planning  Goal: Discharge to home or other facility with appropriate resources  7/15/2025 2041 by Rosalba Ledbetter RN  Outcome: Progressing  Flowsheets (Taken 7/15/2025 1623)  Discharge to home or other facility with appropriate resources:   Identify barriers to discharge with patient and caregiver   Identify discharge learning needs (meds, wound care, etc)   Arrange for needed discharge resources and transportation as appropriate  7/15/2025 1120 by Lexi Jacobo RN  Outcome: Progressing     Problem: Pain  Goal: Verbalizes/displays adequate comfort level or baseline comfort level  7/15/2025 2041 by Rosalba Ledbetter RN  Outcome: Progressing  Flowsheets (Taken 7/15/2025 2015)  Verbalizes/displays adequate comfort level or baseline comfort level:   Encourage patient to monitor pain and request assistance   Administer analgesics based on type and severity of pain and evaluate response   Assess pain using appropriate pain scale  7/15/2025 1120 by Lexi Jacobo RN  Outcome: Progressing     Problem: Arterial:  Goal: Optimize blood flow for wound healing  Description: Optimize blood flow for wound healing  Outcome: Progressing

## 2025-07-16 NOTE — PLAN OF CARE
Problem: Chronic Conditions and Co-morbidities  Goal: Patient's chronic conditions and co-morbidity symptoms are monitored and maintained or improved  Outcome: Completed     Problem: Discharge Planning  Goal: Discharge to home or other facility with appropriate resources  Outcome: Completed     Problem: Pain  Goal: Verbalizes/displays adequate comfort level or baseline comfort level  Outcome: Completed     Problem: Arterial:  Goal: Optimize blood flow for wound healing  Description: Optimize blood flow for wound healing  Outcome: Completed

## 2025-07-16 NOTE — DISCHARGE SUMMARY
Hospital Medicine Discharge Summary    Patient: Rossy Olvera   : 2000     Hospital:  [facility]  Admit Date: 2025   Discharge Date:   2025   Disposition:  Home   Code status:  Full  Condition at Discharge: Stable  Primary Care Provider: Leny Cervantes MD    Admitting Provider: Pamela Fish MD  Discharge Provider: Giovanny Doe MD     Discharge Diagnoses:      Active Hospital Problems    Diagnosis     Intractable nausea and vomiting [R11.2]      Priority: Medium       Presenting Admission History:      ***     Assessment/Plan:      Intractable nausea vomiting : And diarrhea- resolved     Suspect marijuana associated hyperemesis upon admission.  She   receives IV fluids, antiemetics.    clear liquids - tolerating well advance to full liquid diet  IV Protonix   Stool w/u -canceled because of no more diarrhea  IVF  As of cont ab symptoms - GI was consulted ,input appreciated    EGD 7/15  Esophagus: normal. The findings do not support a diagnosis of Fierro's Esophagus.  Stomach: Normal. There was a moderate amount of liquid pooling within the gastric cavity that was suctioned through the scope.   Duodenum: normal   Started Reglan   C/o resolved , toleration reg diet   Ok for dc per GI  Out pt gastric emptying study            Diabetes mellitus type 1 : She is maintained on insulin pump we will continue this.    A1c- 6.8         Hypertension : Noted blood pressure to be elevated this time.  She denies any prior history of hypertension.  She states she has never been on medications.    continue to follow BP will add agents if indicated.  Mom told her BP always goes up, when she is in the hospital  Hydralazine prn if SBP>180     Hypokalemia - replaced      Hypomagnesemia - replaced      Urine preg test - neg        Physical Exam Performed:      BP (!) 165/87   Pulse 59   Temp 97.8 °F (36.6 °C) (Oral)   Resp 16   Ht 1.575 m (5' 2\")   Wt 76.6 kg (168 lb 12.8 oz)   LMP  (LMP

## 2025-07-16 NOTE — CARE COORDINATION
CASE MANAGEMENT DISCHARGE SUMMARY      Discharge to: Home with family    Precertification completed: N/A  Hospital Exemption Notification (HENS) completed: N/A    IMM given: (date) N/A    New Durable Medical Equipment ordered/agency: None ordered    Transportation:    Family/car: Private car       Confirmed discharge plan with: Home with no Home Care needs     Patient: yes     Family:  yes; Present in the room         RN, name: Rosalba    Note: Discharging nurse to complete ECOC, reconcile AVS, and place final copy with patient's discharge packet. RN to ensure that written prescriptions for  Level II medications are sent with patient to the facility as per protocol.  .Ashlyn Schilling RN

## 2025-07-16 NOTE — PROGRESS NOTES
PROGRESS NOTE    HPI: Rossy Olvera is a(n)25 y.o. female admitted for work-up and treatment for Dehydration [E86.0]  Hypokalemia [E87.6]  Hypomagnesemia [E83.42]  Intractable nausea and vomiting [R11.2]  Nausea and vomiting, unspecified vomiting type [R11.2].     We are following for nausea, vomiting.    Subjective:     She is improved today.  Asking for diet advancement.  Was started on reglan yesterday after EGD.      Objective:     I/O last 3 completed shifts:  In: 820 [P.O.:720; I.V.:100]  Out: 800 [Urine:800]      BP (!) 165/87   Pulse 59   Temp 97.8 °F (36.6 °C) (Oral)   Resp 16   Ht 1.575 m (5' 2\")   Wt 76.6 kg (168 lb 12.8 oz)   LMP  (LMP Unknown)   SpO2 98%   BMI 30.87 kg/m²     Physical Exam:  HEENT: anicteric sclera, oropharyngeal membranes pink and moist.  Cor: RRR  Lungs: non-labored, no respiratory distress  Abdomen: soft, NT. No ascites.  No hepatomegaly or splenomegaly  Extremities: no edema  Neuro: alert and oriented x 3      Results:   Lab Results   Component Value Date    ALT 21 07/15/2025    AST 17 07/15/2025    ALKPHOS 64 07/15/2025    BILIDIR 0.5 (H) 07/15/2025    LIPASE 12.0 (L) 07/11/2025     Lab Results   Component Value Date    WBC 11.8 (H) 07/15/2025    HGB 14.8 07/15/2025    HCT 43.5 07/15/2025    MCV 87.4 07/15/2025     07/15/2025     BUN/Cr/glu/ALT/AST/amyl/lip:  7/0.7/--/--/--/--/-- (07/16 0627)  CT ABDOMEN PELVIS W IV CONTRAST Additional Contrast? None  Result Date: 7/11/2025  Heterogeneous liver enhancement and diffuse periportal edema is nonspecific but suggests acute hepatitis. Correlate with appropriate work-up. Electronically signed by Riaz Otero    XR CHEST PORTABLE  Result Date: 7/9/2025  No acute findings are seen. Electronically signed by Antonio Mcqueen        Impression:  25-year-old female with past medical history of insulin-dependent diabetes, ADHD, chronic headaches, marijuana use and anxiety who presented 
      [facility] Medicine Progress Note  V 5.17      Date of Admission: 7/11/2025    Hospital Day: 5      Chief Admission Complaint:  Persistent nausea and vomiting     Subjective:      C/o n and vomiting now   Presenting Admission History:       25 y.o. female with PMH significant for type 1 diabetes she does have insulin pump in place.  There is records in the chart stating she has hypertension but she denies any knowledge of hypertension and states she is on no medications for high blood pressure.     She states she used marijuana approximately 2 weeks ago and over the past 5 days has been having tractable nausea and vomiting.  She has had several visits to the ED for this.  She has been discharged on oral anti emetic agents but returns today with continued nausea and vomiting.     She does report that she last used marijuana several years ago and at that time also had significant nausea and vomiting.     She denies any chest pain or shortness of breath.  She is now be admitted for further evaluation and management        Denies chest pain, no fever chill    Assessment/Plan:      Intractable nausea vomiting : And diarrhea-improved  Suspect marijuana associated hyperemesis upon admission.  She   receives IV fluids, antiemetics.    clear liquids - tolerating well advance to full liquid diet  IV Protonix   Stool w/u -canceled because of no more diarrhea  IVF  As of cont ab symptoms - GI was consulted , planning on EGD today   NPO          Diabetes mellitus type 1 : She is maintained on insulin pump we will continue this.  Discussed with patient she states her sugars remain well-controlled with insulin pump.  She states she does not take any additional insulin through the day.   FL  ed  sliding scale  2/2 low BS  BS  reviewed low this morning  Discussed with patient and mother about starting D5 I will cut down the basal insulin  Cut down the basal insulin and monitor blood sugar closely  D5 was discontinued   A1c- 6.8 
      [facility] Medicine Progress Note  V 5.17      Date of Admission: 7/11/2025    Hospital Day: 2      Chief Admission Complaint:  Persistent nausea and vomiting     Subjective:   still nauseous , better trying clears     Presenting Admission History:       25 y.o. female with PMH significant for type 1 diabetes she does have insulin pump in place.  There is records in the chart stating she has hypertension but she denies any knowledge of hypertension and states she is on no medications for high blood pressure.     She states she used marijuana approximately 2 weeks ago and over the past 5 days has been having tractable nausea and vomiting.  She has had several visits to the ED for this.  She has been discharged on oral anti emetic agents but returns today with continued nausea and vomiting.     She does report that she last used marijuana several years ago and at that time also had significant nausea and vomiting.     She denies any chest pain or shortness of breath.  She is now be admitted for further evaluation and management        Denies chest pain, no fever chill    Assessment/Plan:      Intractable nausea vomiting : improving   Suspect marijuana associated hyperemesis.  She   receives IV fluids, antiemetics.    clear liquids today   IV Protonix            Diabetes mellitus type 1 : She is maintained on insulin pump we will continue this.  Discussed with patient she states her sugars remain well-controlled with insulin pump.  She states she does not take any additional insulin through the day.   Dc  sliding scale  2/2 low BS  Will check A1c     Hypertension : Noted blood pressure to be elevated this time.  She denies any prior history of hypertension.  She states she has never been on medications.    continue to follow BP will add agents if indicated.  Still high but nauseous   There are could of normal reading , monitor     Hypokalemia - replace per SS  and monitor     Urine preg test - neg        Ongoing 
  Physician Progress Note      PATIENT:               MIKE SIMS  CSN #:                  [id number]  :                       2000  ADMIT DATE:       2025 8:34 AM  DISCH DATE:  RESPONDING  PROVIDER #:        Giovanny Doe MD          QUERY TEXT:    Please clarify in documentation the relationship, if any, between  Nausea/vomiting and Gastroparesis. Are the conditions:    The clinical indicators include:  EGD report on 7/15 \"Preoperative Diagnosis:   Nausea/vomiting. Stomach:   Normal. There was a moderate amount of liquid pooling within the gastric   cavity that was suctioned through the scope. Gastroparesis diet -Strict   Glucose control\"  H/P \"25 y.o. female with PMH significant for type 1 diabetes (has insulin   pump) \"  -CBC, CMP, GI consult, EGD  -Meds: IV Reglan  Options provided:  -- Related to or associated with each other  -- Unrelated to each other  -- Other - I will add my own diagnosis  -- Disagree - Not applicable / Not valid  -- Disagree - Clinically unable to determine / Unknown  -- Refer to Clinical Documentation Reviewer    PROVIDER RESPONSE TEXT:    The conditions noted are related to or associated with each other.    Query created by: Ade Sampson on 7/15/2025 4:38 PM      Electronically signed by:  Giovanny Doe MD 2025 8:18 AM          
4 Eyes Skin Assessment     NAME:  Rossy Olvera  YOB: 2000  MEDICAL RECORD NUMBER:  [id number]    The patient is being assessed for  Admission    I agree that at least one RN has performed a thorough Head to Toe Skin Assessment on the patient. ALL assessment sites listed below have been assessed.      Areas assessed by both nurses:    Head, Face, Ears, Shoulders, Back, Chest, Arms, Elbows, Hands, Sacrum. Buttock, Coccyx, Ischium, Legs. Feet and Heels, Under Medical Devices , and Other          Does the Patient have a Wound? No noted wound(s)       Facundo Prevention initiated by RN: No  Wound Care Orders initiated by RN: No    Pressure Injury (Stage 1,2,3,4, Unstageable, DTI, NWPT, and Complex wounds) if present, place Wound referral order by RN under : No    New Ostomies, if present place, Ostomy referral order under : No     Nurse 1 eSignature: Electronically signed by Jhon Jc RN on 7/12/25 at 3:23 AM EDT    **SHARE this note so that the co-signing nurse can place an eSignature**    Nurse 2 eSignature: Electronically signed by Rebecca Camp RN on 7/12/25 at 3:31 AM EDT   
Hospitalist made aware BP elevated. Electronically signed by Lexi Jacobo RN on 7/14/2025 at 4:05 PM     
PS sent to Dr Doe.    Patient's labs are critical with potassium 2.9.      PRN orders are currently in place for replacement.     Electronically signed by Nichole Fisher RN on 7/12/2025 at 11:20 AM    
Patient BS 42 this AM. Patient was awaken from sleep for BS check. Patient was A&OX4 and agreeable to drink OJ and recheck BS in 15 mins.     Patient recheck 84. Patient attempting to eat breakfast at this time. Electronically signed by Lexi Jacobo RN on 7/14/2025 at 8:34 AM     Hospitalist ordered IV NS D/C and start continuous 5% dextrose. Patient and mother refused to do so and would like to speak with hospitalist as the are concerned BS will increase and N/V will occur with elevated BS. Hospitalist made aware. Electronically signed by Lexi Jacobo RN on 7/14/2025 at 9:04 AM     
Patient arrived to [facility]  Vss  Assessment as per flowsheet  Mom at bedside  
Patient transporting to [facility] at this time. Report given to ZOEY RN. Electronically signed by Lexi Jacobo RN on 7/15/2025 at 11:13 AM     
Pt d/c'd home with mother.  Removed  IV and stopped bleeding.  Catheter intact. Pt tolerated well. No redness noted at site. Reviewed d/c instructions, home meds, and  f/u information utilizing teach-back method. Patient verbalized understanding.      
Received Emergency Dept page for admission.  Sent to Dr. Antonio Bernardo admitting/consulting hospitalist. Current listed PCP is Leny Cervantes MD.     
Shift assessment completed. Patient is A&O x4.  VSS. BP elevated. Patient is anxious.  Denies Pain.  IV site patent/ flushed, and infusing. Patient on RA.  Patient's last BM- 7/12. Patient admits to passing gas. Patient ambulates by self to bathroom.  Medication given per MAR.     Patient had critical lab for low potassium at 2.9. Message sent to MD. Replacement orders are in MAR. And were given.     Safety Measures in place:   Denies any needs at this time.   Bed locked and in lowest position.    Call light within reach.   Gripper socks applied. -refused  Patient in stable condition when RN leaving room.      Electronically signed by Nihcole Fisher RN on 7/12/2025 at 11:15 AM    
Urine collected and sent to lab. Electronically signed by Lexi Jacobo RN on 7/15/2025 at 10:46 AM     
Verbal orders to place GI consult and IV hydralazine 10 mg Q6 for SBP > 180. Electronically signed by Lexi Jacobo RN on 7/14/2025 at 4:31 PM    
indicated    Multi-Disciplinary Rounds with Case Management completed on 7/14/2025 with the following recs:     Anticipated Discharge Location: Home     Anticipated Discharge Day/Date:  1-2 days    Barriers to Discharge: Clinical Course    Likely rate limiting factor:     --------------------------------------------------    MDM (any 2 required for High level billing)    A. Problems (any 1)  [x] Acute/Chronic Illness/injury posing ongoing threat to life and/or bodily function without ongoing treatment    [] Severe exacerbation of chronic illness    --------------------------------------------------  B. Risk of Treatment (any 1)    [x] Drugs/treatments that require intensive monitoring for toxicity    [] IV ABX (Vancomycin, Aminoglycosides, etc)     [] Post-Cath/Contrast study requiring serial monitoring    [] IV Narcotic analgesia    [] Aggressive IV diuresis    [] Hypertonic Saline    [x] Critical electrolyte abnormalities requiring IV replacement    [x] Insulin - Scheduled/SSI or Insulin gtt    [] Anticoagulation (Heparin gtt or Coumadin - other anticoagulants in special circumstances)    [] Cardiac Medications (IV Amiodarone/Diltiazem, Tikosyn, etc)    [] Hemodialysis    [] Other -    [] Change in code status    [] Decision to escalate care    [] Major surgery/procedure with associated risk factors    --------------------------------------------------  C. Data (any 2)    [x] Data Review (any 3)    [] Consultant/subspecialist notes from yesterday/today    [x] All available current labs reviewed interpreted for clinical significance    [x] Appropriate follow-up labs were ordered  [] Collateral history obtained     [] Independent Interpretation of tests (any 1)    [] Telemetry (Rhythm Strip) personally reviewed and interpreted        [] Imaging personally reviewed and interpreted     [x] Discussion (any 1)  [x] Multi-Disciplinary Rounds with Case Management  [] Discussed management of the case with           Labs:  
10.9 12.8*   HGB 12.8 12.9 13.3   HCT 38.4 37.9 38.8    355 362     Recent Labs     07/11/25  0934 07/12/25  0707 07/12/25  1640 07/13/25  0812    139  --  138   K 3.4* 2.9* 3.6 3.4*    105  --  104   CO2 17* 20*  --  22   BUN 8 4*  --  4*   CREATININE 0.7 0.7  --  0.7   CALCIUM 9.1 8.4  --  8.8   MG 1.77* 2.03  --  1.97     No results for input(s): \"PROBNP\", \"TROPHS\" in the last 72 hours.  Recent Labs     07/12/25  0707   LABA1C 6.8     Recent Labs     07/10/25  1548 07/11/25  0934 07/12/25  0707   AST 20 24 27   ALT 17 21 23   BILITOT 0.4 0.6 0.5   ALKPHOS 49 52 52     No results for input(s): \"INR\", \"LACTA\", \"TSH\" in the last 72 hours.    Urine Cultures: No results found for: \"LABURIN\"  Blood Cultures: No results found for: \"BC\"  No results found for: \"BLOODCULT2\"  Organism: No results found for: \"ORG\"      Giovanny Doe MD    
history obtained     [] Independent Interpretation of tests (any 1)    [] Telemetry (Rhythm Strip) personally reviewed and interpreted        [] Imaging personally reviewed and interpreted     [x] Discussion (any 1)  [x] Multi-Disciplinary Rounds with Case Management  [] Discussed management of the case with           Labs:  Personally reviewed on 7/16/2025 and interpreted for clinical significance as documented above.     Recent Labs     07/14/25  0846 07/15/25  0938   WBC 10.5 11.8*   HGB 14.5 14.8   HCT 43.0 43.5    443     Recent Labs     07/14/25  0846 07/15/25  0938 07/16/25  0627    136 134*   K 3.3* 4.0 3.5    102 100   CO2 20* 19* 21   BUN 4* 7 7   CREATININE 0.7 0.8 0.7   CALCIUM 8.9 9.0 8.5   MG 1.85  --  1.79*     No results for input(s): \"PROBNP\", \"TROPHS\" in the last 72 hours.  No results for input(s): \"LABA1C\" in the last 72 hours.    Recent Labs     07/15/25  0938   AST 17   ALT 21   BILIDIR 0.5*   BILITOT 1.0   ALKPHOS 64       No results for input(s): \"INR\", \"LACTA\", \"TSH\" in the last 72 hours.    Urine Cultures: No results found for: \"LABURIN\"  Blood Cultures: No results found for: \"BC\"  No results found for: \"BLOODCULT2\"  Organism: No results found for: \"ORG\"      Giovanny Doe MD

## 2025-08-06 ENCOUNTER — TRANSCRIBE ORDERS (OUTPATIENT)
Dept: ADMINISTRATIVE | Age: 25
End: 2025-08-06

## 2025-08-06 DIAGNOSIS — R11.2 NAUSEA AND VOMITING, UNSPECIFIED VOMITING TYPE: Primary | ICD-10-CM

## 2025-08-19 ENCOUNTER — HOSPITAL ENCOUNTER (OUTPATIENT)
Dept: NUCLEAR MEDICINE | Age: 25
Discharge: HOME | End: 2025-08-19
Attending: INTERNAL MEDICINE
Payer: COMMERCIAL

## 2025-08-19 DIAGNOSIS — R11.2 NAUSEA AND VOMITING, UNSPECIFIED VOMITING TYPE: ICD-10-CM

## 2025-08-19 PROCEDURE — 78264 GASTRIC EMPTYING IMG STUDY: CPT

## 2025-08-19 PROCEDURE — A9541 TC99M SULFUR COLLOID: HCPCS | Performed by: INTERNAL MEDICINE

## 2025-08-19 PROCEDURE — 3430000000 HC RX DIAGNOSTIC RADIOPHARMACEUTICAL: Performed by: INTERNAL MEDICINE

## 2025-08-19 RX ADMIN — Medication 1 MILLICURIE: at 07:34

## (undated) DEVICE — CONMED SCOPE SAVER BITE BLOCK, 20X27 MM: Brand: SCOPE SAVER

## (undated) DEVICE — ENDO CARRY-ON PROCEDURE KIT INCLUDES SUCTION TUBING, LUBRICANT, GAUZE, BIOHAZARD STICKER, TRANSPORT PAD AND INTERCEPT BEDSIDE KIT.: Brand: ENDO CARRY-ON PROCEDURE KIT